# Patient Record
Sex: FEMALE | Race: BLACK OR AFRICAN AMERICAN | Employment: OTHER | URBAN - METROPOLITAN AREA
[De-identification: names, ages, dates, MRNs, and addresses within clinical notes are randomized per-mention and may not be internally consistent; named-entity substitution may affect disease eponyms.]

---

## 2017-01-12 ENCOUNTER — OP HISTORICAL/CONVERTED ENCOUNTER (OUTPATIENT)
Dept: OTHER | Age: 62
End: 2017-01-12

## 2017-03-28 ENCOUNTER — OP HISTORICAL/CONVERTED ENCOUNTER (OUTPATIENT)
Dept: OTHER | Age: 62
End: 2017-03-28

## 2017-06-12 ENCOUNTER — OFFICE VISIT (OUTPATIENT)
Dept: ENDOCRINOLOGY | Age: 62
End: 2017-06-12

## 2017-06-12 VITALS
SYSTOLIC BLOOD PRESSURE: 142 MMHG | HEART RATE: 74 BPM | DIASTOLIC BLOOD PRESSURE: 81 MMHG | WEIGHT: 210 LBS | OXYGEN SATURATION: 96 % | RESPIRATION RATE: 20 BRPM | HEIGHT: 67 IN | BODY MASS INDEX: 32.96 KG/M2 | TEMPERATURE: 98.2 F

## 2017-06-12 DIAGNOSIS — E04.2 MULTINODULAR GOITER: Primary | ICD-10-CM

## 2017-06-12 DIAGNOSIS — M81.0 SENILE OSTEOPOROSIS: ICD-10-CM

## 2017-06-12 DIAGNOSIS — E83.52 HYPERCALCEMIA: ICD-10-CM

## 2017-06-12 RX ORDER — FERROUS SULFATE 324(65)MG
TABLET, DELAYED RELEASE (ENTERIC COATED) ORAL
COMMUNITY
End: 2021-06-21

## 2017-06-12 RX ORDER — TOPIRAMATE 50 MG/1
1 TABLET, FILM COATED ORAL AS NEEDED
COMMUNITY
Start: 2017-03-15 | End: 2021-06-21

## 2017-06-12 RX ORDER — OLANZAPINE 20 MG/1
10 TABLET ORAL
COMMUNITY
Start: 2017-05-14 | End: 2022-06-23

## 2017-06-12 RX ORDER — LITHIUM CARBONATE 300 MG
1 TABLET ORAL 2 TIMES DAILY
COMMUNITY
Start: 2017-06-06 | End: 2021-10-14

## 2017-06-12 NOTE — PROGRESS NOTES
HISTORY OF PRESENT ILLNESS  Janki De Leon is a 63 y.o. female. HPI  Initial visit for thyroid goiter and hypercalcemia   Referred by Dr. Weston Teixeira     Patient is not a good historian   She denies any hyper or hypothyroid symptoms   She denies any compressive symptoms       Past medical history : she has psychiatric issues/ schizophrenia  - on lithium and topiramate     Social History     Social History    Marital status: UNKNOWN     Spouse name: N/A    Number of children: N/A    Years of education: N/A     Occupational History    Not on file. Social History Main Topics    Smoking status: Current Every Day Smoker    Smokeless tobacco: Not on file    Alcohol use Not on file    Drug use: Not on file    Sexual activity: Not on file     Other Topics Concern    Not on file     Social History Narrative    No narrative on file       Family History   Problem Relation Age of Onset    Family history unknown: Yes           Review of Systems   Constitutional: Negative. HENT: Negative. Eyes: Negative for pain and redness. Respiratory: Negative. Cardiovascular: Negative for chest pain, palpitations and leg swelling. Gastrointestinal: Negative. Negative for constipation. Genitourinary: Negative. Musculoskeletal: Negative for myalgias. Skin: Negative. Neurological: Negative. Endo/Heme/Allergies: Negative. Psychiatric/Behavioral: Negative for depression and memory loss. The patient does not have insomnia. Physical Exam   Constitutional: She is oriented to person, place, and time. She appears well-developed and well-nourished. HENT:   Head: Normocephalic. Eyes: Conjunctivae and EOM are normal. Pupils are equal, round, and reactive to light. Neck: Normal range of motion. Neck supple. No JVD present. No tracheal deviation present. Thyromegaly present. Cardiovascular: Normal rate, regular rhythm and normal heart sounds. No murmur heard.   Pulmonary/Chest: Breath sounds normal. Abdominal: Soft. Bowel sounds are normal.   Musculoskeletal: Normal range of motion. Lymphadenopathy:     She has no cervical adenopathy. Neurological: She is alert and oriented to person, place, and time. She has normal reflexes. Skin: Skin is warm. Psychiatric: She has a normal mood and affect. Got labs from feb 2017 : TSH is 3.35      ASSESSMENT and PLAN    1. Multinodular goiter   We discussed the natural history of thyroid nodules and the fact that only 5% are malignant. Discussed how we manage nodules  a) pursuing a fine needle aspiration of the nodule, b) expectant management with repeat ultrasound in 6 months, or c) surgical removal of the nodules/goiter. Will do usg and thyroid scan   - follow up to be determined based on  results    2. Hypercalcemia : was 10.5 on feb 2017 labs   Ordered other labs to look for hyperparathyroidism    3.  Osteoporosis : ordered the bone DEXA  Explained the management plan    > 50 % of time is spent on counseling     Addendum :  After placing all the orders , her brother walks in rudely and mentions to my nurse that pt will not go anywhere, waste of time and money   He says she had all the tests and will drop them off     He did bring a CD back, but it is MRI of brain

## 2017-06-12 NOTE — MR AVS SNAPSHOT
Visit Information Date & Time Provider Department Dept. Phone Encounter #  
 6/12/2017  3:00 PM Paulo Rodríguez MD [facility] [phone] [id number] Follow-up Instructions Return in about 3 months (around 9/12/2017). Upcoming Health Maintenance Date Due Hepatitis C Screening 1955 Pneumococcal 19-64 Medium Risk (1 of 1 - PPSV23) 2/15/1976 DTaP/Tdap/Td series (1 - Tdap) 2/15/1978 PAP AKA CERVICAL CYTOLOGY 2/15/1978 BREAST CANCER SCRN MAMMOGRAM 2/15/2007 FOBT Q 1 YEAR AGE 50-75 2/15/2007 ZOSTER VACCINE AGE 60> 2/15/2017 INFLUENZA AGE 9 TO ADULT 8/1/2017 Allergies as of 6/12/2017  Review Complete On: 6/12/2017 By: Paulo Rodríguez MD  
 No Known Allergies Current Immunizations  Never Reviewed No immunizations on file. Not reviewed this visit You Were Diagnosed With   
  
 Codes Comments Senile osteoporosis    -  Primary ICD-10-CM: M81.0 ICD-9-CM: 733.01 Hypercalcemia     ICD-10-CM: K12.25 
ICD-9-CM: 275.42 Multinodular goiter     ICD-10-CM: E04.2 ICD-9-CM: 966. 1 Vitals BP Pulse Temp Resp Height(growth percentile) Weight(growth percentile) 142/81 74 98.2 °F (36.8 °C) (Oral) 20 5' 7\" (1.702 m) 210 lb (95.3 kg) SpO2 BMI OB Status Smoking Status 96% 32.89 kg/m2 Postmenopausal Current Every Day Smoker BMI and BSA Data Body Mass Index Body Surface Area  
 32.89 kg/m 2 2.12 m 2 Your Updated Medication List  
  
   
This list is accurate as of: 6/12/17  4:53 PM.  Always use your most recent med list.  
  
  
  
  
 ferrous sulfate 324 mg (65 mg iron) tablet Take  by mouth Daily (before breakfast). lithium carbonate 300 mg tablet Take 1 Tab by mouth two (2) times a day. OLANZapine 20 mg tablet Commonly known as:  ZyPREXA Take 1 Tab by mouth nightly. topiramate 50 mg tablet Commonly known as:  TOPAMAX Take 1 Tab by mouth as needed. Follow-up Instructions Return in about 3 months (around 9/12/2017). To-Do List   
 06/12/2017 Imaging:  DEXA BONE DENSITY STUDY AXIAL   
  
 06/12/2017 Imaging:  NM PARATHYROID SCAN   
  
 06/12/2017 Imaging:  US THYROID/PARATHYROID/SOFT TISS Introducing [facility]! [address] introduces [employer] patient portal. Now you can access parts of your medical record, email your doctor's office, and request medication refills online. 1. In your internet browser, go to [email or web address] 2. Click on the First Time User? Click Here link in the Sign In box. You will see the New Member Sign Up page. 3. Enter your [employer] Access Code exactly as it appears below. You will not need to use this code after youve completed the sign-up process. If you do not sign up before the expiration date, you must request a new code. · [employer] Access Code: [email or web address] Expires: 9/10/2017  4:53 PM 
 
4. Enter the last four digits of your Social Security Number (xxxx) and Date of Birth (mm/dd/yyyy) as indicated and click Submit. You will be taken to the next sign-up page. 5. Create a [facility] ID. This will be your [employer] login ID and cannot be changed, so think of one that is secure and easy to remember. 6. Create a [employer] password. You can change your password at any time. 7. Enter your Password Reset Question and Answer. This can be used at a later time if you forget your password. 8. Enter your e-mail address. You will receive e-mail notification when new information is available in [address] 9. Click Sign Up. You can now view and download portions of your medical record. 10. Click the Download Summary menu link to download a portable copy of your medical information. If you have questions, please visit the Frequently Asked Questions section of the [employer] website. Remember, [employer] is NOT to be used for urgent needs. For medical emergencies, dial 911. Now available from your iPhone and Android! Please provide this summary of care documentation to your next provider. Your primary care clinician is listed as Will Villagran. If you have any questions after today's visit, please call [phone].

## 2017-06-12 NOTE — LETTER
6/28/2017 7:53 AM 
 
Patient:  Sherie Sharp YOB: 1955 Date of Visit: 6/12/2017 Dear Prakash Solano MD 
 [address] [facility] [address] VIA Facsimile: [phone] 
 : Thank you for referring Ms. Russell Chavez to me for evaluation/treatment. Below are the relevant portions of my assessment and plan of care. Wt Readings from Last 3 Encounters:  
06/12/17 210 lb (95.3 kg) Temp Readings from Last 3 Encounters:  
06/12/17 98.2 °F (36.8 °C) (Oral) BP Readings from Last 3 Encounters:  
06/12/17 142/81 Pulse Readings from Last 3 Encounters:  
06/12/17 74 HISTORY OF PRESENT ILLNESS Sherie Sharp is a 63 y.o. female. HPI Initial visit for thyroid goiter and hypercalcemia Referred by Dr. Hood Bazan Patient is not a good historian She denies any hyper or hypothyroid symptoms She denies any compressive symptoms Past medical history : she has psychiatric issues/ schizophrenia  - on lithium and topiramate Social History Social History  Marital status: UNKNOWN Spouse name: N/A  
 Number of children: N/A  
 Years of education: N/A Occupational History  Not on file. Social History Main Topics  Smoking status: Current Every Day Smoker  Smokeless tobacco: Not on file  Alcohol use Not on file  Drug use: Not on file  Sexual activity: Not on file Other Topics Concern  Not on file Social History Narrative  No narrative on file Family History Problem Relation Age of Onset  Family history unknown: Yes Review of Systems Constitutional: Negative. HENT: Negative. Eyes: Negative for pain and redness. Respiratory: Negative. Cardiovascular: Negative for chest pain, palpitations and leg swelling. Gastrointestinal: Negative. Negative for constipation. Genitourinary: Negative. Musculoskeletal: Negative for myalgias. Skin: Negative. Neurological: Negative. Endo/Heme/Allergies: Negative. Psychiatric/Behavioral: Negative for depression and memory loss. The patient does not have insomnia. Physical Exam  
Constitutional: She is oriented to person, place, and time. She appears well-developed and well-nourished. HENT:  
Head: Normocephalic. Eyes: Conjunctivae and EOM are normal. Pupils are equal, round, and reactive to light. Neck: Normal range of motion. Neck supple. No JVD present. No tracheal deviation present. Thyromegaly present. Cardiovascular: Normal rate, regular rhythm and normal heart sounds. No murmur heard. Pulmonary/Chest: Breath sounds normal.  
Abdominal: Soft. Bowel sounds are normal.  
Musculoskeletal: Normal range of motion. Lymphadenopathy:  
  She has no cervical adenopathy. Neurological: She is alert and oriented to person, place, and time. She has normal reflexes. Skin: Skin is warm. Psychiatric: She has a normal mood and affect. Got labs from feb 2017 : TSH is 3.35 
 
 
ASSESSMENT and PLAN 1. Multinodular goiter We discussed the natural history of thyroid nodules and the fact that only 5% are malignant. Discussed how we manage nodules  a) pursuing a fine needle aspiration of the nodule, b) expectant management with repeat ultrasound in 6 months, or c) surgical removal of the nodules/goiter. Will do usg and thyroid scan  
- follow up to be determined based on  results 2. Hypercalcemia : was 10.5 on feb 2017 labs Ordered other labs to look for hyperparathyroidism Explained the management plan 
 
> 50 % of time is spent on counseling Addendum : 
After placing all the orders , her brother walks in rudely and mentions to my nurse that pt will not go anywhere, waste of time and money He says she had all the tests and will drop them off He did bring a CD back, but it is MRI of brain If you have questions, please do not hesitate to call me.   I look forward to following Ms. Valdes Dates along with you. Sincerely, Brandon Chapman MD

## 2017-06-12 NOTE — PROGRESS NOTES
Wt Readings from Last 3 Encounters:   06/12/17 210 lb (95.3 kg)     Temp Readings from Last 3 Encounters:   06/12/17 98.2 °F (36.8 °C) (Oral)     BP Readings from Last 3 Encounters:   06/12/17 142/81     Pulse Readings from Last 3 Encounters:   06/12/17 74

## 2017-07-07 ENCOUNTER — HOSPITAL ENCOUNTER (OUTPATIENT)
Dept: MAMMOGRAPHY | Age: 62
Discharge: HOME | End: 2017-07-07
Attending: INTERNAL MEDICINE
Payer: MEDICARE

## 2017-07-07 ENCOUNTER — HOSPITAL ENCOUNTER (OUTPATIENT)
Dept: ULTRASOUND IMAGING | Age: 62
Discharge: HOME | End: 2017-07-07
Attending: INTERNAL MEDICINE
Payer: MEDICARE

## 2017-07-07 ENCOUNTER — HOSPITAL ENCOUNTER (OUTPATIENT)
Dept: NUCLEAR MEDICINE | Age: 62
Discharge: HOME | End: 2017-07-07
Attending: INTERNAL MEDICINE
Payer: MEDICARE

## 2017-07-07 DIAGNOSIS — E04.2 MULTINODULAR GOITER: ICD-10-CM

## 2017-07-07 DIAGNOSIS — E83.52 HYPERCALCEMIA: ICD-10-CM

## 2017-07-07 DIAGNOSIS — M81.0 SENILE OSTEOPOROSIS: ICD-10-CM

## 2017-07-07 PROCEDURE — 77080 DXA BONE DENSITY AXIAL: CPT

## 2017-07-07 PROCEDURE — 78070 PARATHYROID PLANAR IMAGING: CPT

## 2017-07-07 PROCEDURE — 76536 US EXAM OF HEAD AND NECK: CPT

## 2017-07-18 NOTE — PROGRESS NOTES
On ultrasound of thyroid, are 2 left sided nodules , sizes of which dictate need for FNA  On both of them   She is dependant on brother's transportation, and so arrange for in office FNA  In 6-8 weeks

## 2018-01-03 ENCOUNTER — OFFICE VISIT (OUTPATIENT)
Dept: ENDOCRINOLOGY | Age: 63
End: 2018-01-03

## 2018-01-03 ENCOUNTER — DOCUMENTATION ONLY (OUTPATIENT)
Dept: ENDOCRINOLOGY | Age: 63
End: 2018-01-03

## 2018-01-03 ENCOUNTER — HOSPITAL ENCOUNTER (OUTPATIENT)
Dept: LAB | Age: 63
Discharge: HOME | End: 2018-01-03

## 2018-01-03 VITALS
RESPIRATION RATE: 16 BRPM | HEIGHT: 67 IN | OXYGEN SATURATION: 96 % | WEIGHT: 206.4 LBS | HEART RATE: 89 BPM | TEMPERATURE: 98.1 F | DIASTOLIC BLOOD PRESSURE: 82 MMHG | BODY MASS INDEX: 32.39 KG/M2 | SYSTOLIC BLOOD PRESSURE: 153 MMHG

## 2018-01-03 DIAGNOSIS — E04.1 THYROID NODULE: Primary | ICD-10-CM

## 2018-01-03 NOTE — PROGRESS NOTES
[facility]  OFFICE PROCEDURE PROGRESS NOTE        Chart reviewed for the following:   Erika Wong MD, have reviewed the History, Physical and updated the Allergic reactions for Janki Tilley performed immediately prior to start of procedure:   Erika Wong MD, have performed the following reviews on Janki Sy prior to the start of the procedure:            * Patient was identified by name and date of birth   * Agreement on procedure being performed was verified  * Risks and Benefits explained to the patient  * Procedure site verified and marked as necessary  * Patient was positioned for comfort  * Consent was signed and verified     Time: 4.15 pm    Pain scale : 0    Date of procedure: 1/3/2018    Procedure performed by:  Villa Sam MD    Provider assisted by:  Austin Antony LPN    Patient assisted by: her brother       Real time imaging was performed in both transverse and sagittal planes    Left superior pole nodule   Following informed consent, a procedural pause was held to confirm patiient identity and site of biopsy. After sterile preparation, FNA guidance was performed using direct ultrasound guidance to confirm accurate needle placement. 4 aspirations were made using 25 G needles  Samples were submitted to cytology  Patient  tolerated procedure well without complications  After care instructions provided.       Pain scale post procedure : 4

## 2018-01-03 NOTE — MR AVS SNAPSHOT
Visit Information Date & Time Provider Department Dept. Phone Encounter #  
 1/3/2018  3:00 PM Heena Villa MD [facility] [phone] [id number] Follow-up Instructions Return in about 4 months (around 5/3/2018) for biopsy on left lower nodule and visit . Upcoming Health Maintenance Date Due Hepatitis C Screening 1955 Pneumococcal 19-64 Medium Risk (1 of 1 - PPSV23) 2/15/1976 DTaP/Tdap/Td series (1 - Tdap) 2/15/1978 PAP AKA CERVICAL CYTOLOGY 2/15/1978 BREAST CANCER SCRN MAMMOGRAM 2/15/2007 FOBT Q 1 YEAR AGE 50-75 2/15/2007 ZOSTER VACCINE AGE 60> 12/15/2016 Influenza Age 5 to Adult 8/1/2017 Allergies as of 1/3/2018  Review Complete On: 1/3/2018 By: Heena Villa MD  
 No Known Allergies Current Immunizations  Never Reviewed No immunizations on file. Not reviewed this visit Vitals BP Pulse Temp Resp Height(growth percentile) Weight(growth percentile) 153/82 (BP 1 Location: Right arm, BP Patient Position: Sitting) 89 98.1 °F (36.7 °C) (Oral) 16 5' 7\" (1.702 m) 206 lb 6.4 oz (93.6 kg) SpO2 BMI OB Status Smoking Status 96% 32.33 kg/m2 Postmenopausal Current Every Day Smoker Vitals History BMI and BSA Data Body Mass Index Body Surface Area  
 32.33 kg/m 2 2.1 m 2 Preferred Pharmacy Pharmacy Name Phone [phone] [place] [address] [phone] Your Updated Medication List  
  
   
This list is accurate as of: 1/3/18  4:37 PM.  Always use your most recent med list.  
  
  
  
  
 ferrous sulfate 324 mg (65 mg iron) tablet Take  by mouth Daily (before breakfast). lithium carbonate 300 mg tablet Take 1 Tab by mouth two (2) times a day. OLANZapine 20 mg tablet Commonly known as:  ZyPREXA Take 1 Tab by mouth nightly. topiramate 50 mg tablet Commonly known as:  TOPAMAX Take 1 Tab by mouth as needed. Follow-up Instructions Return in about 4 months (around 5/3/2018) for biopsy on left lower nodule and visit . Patient Instructions Please take tylenol 500 mg or Motrin 400 mg (2 pills of 200 mg dose) OTC,  if there is any biopsy site pain You can go back to your normal routine immediately If you notice any dime sized redness, at the biopsy site, it is normal and do not worry If you have more symptoms and signs requiring emergency assistance,  call 911   or go to Emergency room Please provide this summary of care documentation to your next provider. Your primary care clinician is listed as Miguel Ledbetter. If you have any questions after today's visit, please call [phone].

## 2018-01-03 NOTE — PROGRESS NOTES
Chief Complaint   Patient presents with    Biopsy     1. Have you been to the ER, urgent care clinic since your last visit? Hospitalized since your last visit? No    2. Have you seen or consulted any other health care providers outside of the [address] [place] [address] since your last visit? Include any pap smears or colon screening.  No       Wt Readings from Last 3 Encounters:   01/03/18 206 lb 6.4 oz (93.6 kg)   06/12/17 210 lb (95.3 kg)     Temp Readings from Last 3 Encounters:   01/03/18 98.1 °F (36.7 °C) (Oral)   06/12/17 98.2 °F (36.8 °C) (Oral)     BP Readings from Last 3 Encounters:   01/03/18 153/82   06/12/17 142/81     Pulse Readings from Last 3 Encounters:   01/03/18 89   06/12/17 74

## 2018-01-03 NOTE — PROGRESS NOTES
Deferred FNA of left lower pole nodule which could be possible parathyroid adenoma for future     Thomas Hoover MD

## 2018-03-28 ENCOUNTER — HOSPITAL ENCOUNTER (OUTPATIENT)
Dept: MRI IMAGING | Age: 63
Discharge: HOME | End: 2018-03-28
Attending: ORTHOPAEDIC SURGERY
Payer: MEDICARE

## 2018-03-28 DIAGNOSIS — M54.2 CERVICALGIA: ICD-10-CM

## 2018-03-28 PROCEDURE — 72141 MRI NECK SPINE W/O DYE: CPT

## 2018-04-30 ENCOUNTER — TELEPHONE (OUTPATIENT)
Dept: ENDOCRINOLOGY | Age: 63
End: 2018-04-30

## 2018-04-30 DIAGNOSIS — E04.2 MULTINODULAR GOITER: Primary | ICD-10-CM

## 2018-04-30 DIAGNOSIS — E83.52 HYPERCALCEMIA: ICD-10-CM

## 2018-11-27 ENCOUNTER — OP HISTORICAL/CONVERTED ENCOUNTER (OUTPATIENT)
Dept: OTHER | Age: 63
End: 2018-11-27

## 2018-12-12 ENCOUNTER — HOSPITAL ENCOUNTER (OUTPATIENT)
Dept: LAB | Age: 63
Discharge: HOME | End: 2018-12-12
Payer: MEDICARE

## 2018-12-12 ENCOUNTER — OFFICE VISIT (OUTPATIENT)
Dept: ENDOCRINOLOGY | Age: 63
End: 2018-12-12

## 2018-12-12 VITALS
SYSTOLIC BLOOD PRESSURE: 157 MMHG | TEMPERATURE: 97.6 F | WEIGHT: 217.3 LBS | OXYGEN SATURATION: 98 % | HEIGHT: 67 IN | HEART RATE: 85 BPM | BODY MASS INDEX: 34.11 KG/M2 | RESPIRATION RATE: 18 BRPM | DIASTOLIC BLOOD PRESSURE: 88 MMHG

## 2018-12-12 DIAGNOSIS — E55.9 VITAMIN D DEFICIENCY: ICD-10-CM

## 2018-12-12 DIAGNOSIS — E83.52 HYPERCALCEMIA: Primary | ICD-10-CM

## 2018-12-12 DIAGNOSIS — E04.2 MULTINODULAR GOITER: ICD-10-CM

## 2018-12-12 PROCEDURE — 84443 ASSAY THYROID STIM HORMONE: CPT

## 2018-12-12 PROCEDURE — 83970 ASSAY OF PARATHORMONE: CPT

## 2018-12-12 PROCEDURE — 82306 VITAMIN D 25 HYDROXY: CPT

## 2018-12-12 PROCEDURE — 36415 COLL VENOUS BLD VENIPUNCTURE: CPT

## 2018-12-12 PROCEDURE — 80053 COMPREHEN METABOLIC PANEL: CPT

## 2018-12-12 NOTE — PROGRESS NOTES
1. Have you been to the ER, urgent care clinic since your last visit? No  Hospitalized since your last visit? No    2. Have you seen or consulted any other health care providers outside of the [address] [place] [address] since your last visit? Include any pap smears or colon screening.  No     Wt Readings from Last 3 Encounters:   12/12/18 217 lb 4.8 oz (98.6 kg)   01/03/18 206 lb 6.4 oz (93.6 kg)   06/12/17 210 lb (95.3 kg)     Temp Readings from Last 3 Encounters:   12/12/18 97.6 °F (36.4 °C) (Oral)   01/03/18 98.1 °F (36.7 °C) (Oral)   06/12/17 98.2 °F (36.8 °C) (Oral)     BP Readings from Last 3 Encounters:   12/12/18 157/88   01/03/18 153/82   06/12/17 142/81     Pulse Readings from Last 3 Encounters:   12/12/18 85   01/03/18 89   06/12/17 74

## 2018-12-12 NOTE — PROGRESS NOTES
HISTORY OF PRESENT ILLNESS  Janki Burnette is a 66 y.o. female. First f/u after initial visit for thyroid goiter and hypercalcemia   From June 2017       Pt had FNA done in jan 2018   Negative for cancer       Pt had undergone parathyroid study  In nov 2018 for hypercalcemia eval  - done by nephrologist           Old history   Referred by Dr. Melanie Oconnor     Patient is not a good historian   She denies any hyper or hypothyroid symptoms   She denies any compressive symptoms       Past medical history : she has psychiatric issues/ schizophrenia  - on lithium and topiramate     Social History     Socioeconomic History    Marital status: SINGLE     Spouse name: Not on file    Number of children: Not on file    Years of education: Not on file    Highest education level: Not on file   Social Needs    Financial resource strain: Not on file    Food insecurity - worry: Not on file    Food insecurity - inability: Not on file    Transportation needs - medical: Not on file   [employer] needs - non-medical: Not on file   Occupational History    Not on file   Tobacco Use    Smoking status: Current Every Day Smoker    Smokeless tobacco: Never Used   Substance and Sexual Activity    Alcohol use: Not on file    Drug use: Not on file    Sexual activity: Not on file   Other Topics Concern    Not on file   Social History Narrative    Not on file       Family History   Family history unknown: Yes           Review of Systems   Constitutional: Negative. HENT: Negative. Eyes: Negative for pain and redness. Respiratory: Negative. Cardiovascular: Negative for chest pain, palpitations and leg swelling. Gastrointestinal: Negative. Negative for constipation. Genitourinary: Negative. Musculoskeletal: Negative for myalgias. Skin: Negative. Neurological: Negative. Endo/Heme/Allergies: Negative. Psychiatric/Behavioral: Negative for depression and memory loss. The patient does not have insomnia. Physical Exam   Constitutional: She is oriented to person, place, and time. She appears well-developed and well-nourished. HENT:   Head: Normocephalic. Eyes: Conjunctivae and EOM are normal. Pupils are equal, round, and reactive to light. Neck: Normal range of motion. Neck supple. No JVD present. No tracheal deviation present. Thyromegaly present. Cardiovascular: Normal rate, regular rhythm and normal heart sounds. No murmur heard. Pulmonary/Chest: Breath sounds normal.   Abdominal: Soft. Bowel sounds are normal.   Musculoskeletal: Normal range of motion. Lymphadenopathy:     She has no cervical adenopathy. Neurological: She is alert and oriented to person, place, and time. She has normal reflexes. Skin: Skin is warm. Psychiatric: She has a normal mood and affect. Got labs from feb 2017 : TSH is 3.35      ASSESSMENT and PLAN    1. Multinodular goiter   usg : July 2017    Left side upper nodule is thyroid nodule  And   Lower nodule is 1.8 cm seems parathyroid nodule   FNA  Done in jan 2018  On left lobe upper nodule -    Negative for cancer     2. Hypercalcemia : was 10.5 on feb 2017 labs   Ordered other labs to look for hyperparathyroidism, which she got it done at nephrologist office   Intact  PTH  77 from oct 25 2018     The parathyroid scan at [facility] positivity on left inferior pole matching to the location of  parathyrodi nodule on usg done at [facility]   She can go for parathyroidectomy       3. Very low vit d   :  Very low at  5.9      3.  Osteoporosis :   Date : juy 2017  Bone DEXA  ap spine T-score 1.5 ; left femoral Neck T- score  -0.7 , right femoral neck T-score n/ a      > 50 % of time is spent on counseling   Patient voiced understanding her plan of care       Addendum :  After placing all the orders , her brother walks in rudely and mentions to my nurse that pt will not go anywhere, waste of time and money   He says she had all the tests and will drop them off   He did bring a CD back, but it is MRI of brain

## 2018-12-12 NOTE — PATIENT INSTRUCTIONS
--------------------------------------------------------------------------------------------    Refills    -    please call your pharmacy and have them send us a refill request    Results  -  allow up to a week for lab results to be processed and reviewed. Phone calls  -  Allow upto 24 hrs. for non-urgent calls to be retained    Prior authorization - It may take up to 4 weeks to process, depending on your insurance    Forms  -  FMLA, DMV, patient assistance, etc. will take up to 2 weeks to process    Cancellations - please notify the office in advance if you cannot keep your appointment    Samples  - will only be dispensed at visits as supply is limited      If you are having a medical emergency call 911    --------------------------------------------------------------------------------------------      24 hour urine collection instructions  For calcium  Sodium and ceat     On the day you plan to collect urine    1. Discard first urine sample soon after you get up    2. Start collecting urine samples in the container then on whole first day . Lito Willoughby ... 3. until the first sample next day is collected into the jar.

## 2018-12-13 LAB
25(OH)D3+25(OH)D2 SERPL-MCNC: 5.2 NG/ML (ref 30–100)
ALBUMIN SERPL-MCNC: 4.4 G/DL (ref 3.6–4.8)
ALBUMIN/GLOB SERPL: 1.6 {RATIO} (ref 1.2–2.2)
ALP SERPL-CCNC: 97 IU/L (ref 39–117)
ALT SERPL-CCNC: 11 IU/L (ref 0–32)
AST SERPL-CCNC: 13 IU/L (ref 0–40)
BILIRUB SERPL-MCNC: 0.4 MG/DL (ref 0–1.2)
BUN SERPL-MCNC: 9 MG/DL (ref 8–27)
BUN/CREAT SERPL: 7 (ref 12–28)
CALCIUM SERPL-MCNC: 10.6 MG/DL (ref 8.7–10.3)
CHLORIDE SERPL-SCNC: 104 MMOL/L (ref 96–106)
CO2 SERPL-SCNC: 27 MMOL/L (ref 20–29)
CREAT SERPL-MCNC: 1.24 MG/DL (ref 0.57–1)
GLOBULIN SER CALC-MCNC: 2.8 G/DL (ref 1.5–4.5)
GLUCOSE SERPL-MCNC: 77 MG/DL (ref 65–99)
INTERPRETATION: NORMAL
POTASSIUM SERPL-SCNC: 4.3 MMOL/L (ref 3.5–5.2)
PROT SERPL-MCNC: 7.2 G/DL (ref 6–8.5)
PTH-INTACT SERPL-MCNC: 90 PG/ML (ref 15–65)
SODIUM SERPL-SCNC: 143 MMOL/L (ref 134–144)
TSH SERPL DL<=0.005 MIU/L-ACNC: 2.93 UIU/ML (ref 0.45–4.5)

## 2018-12-14 NOTE — PROGRESS NOTES
This pt has low health literacy - so explain it well to her     This pt has slightly higher calcium level,  Has high functioning parathyrodi gland     She has very low vit D level - which could be result of above or   Could be very low from low intake itself --     She could be on low dose OTC vit d supplementation 1000 int units a day

## 2018-12-17 NOTE — PROGRESS NOTES
Patient informed of results. Patient verbalized understanding. States she will  OTC Vitamin D today.

## 2018-12-18 ENCOUNTER — HOSPITAL ENCOUNTER (OUTPATIENT)
Dept: LAB | Age: 63
Discharge: HOME | End: 2018-12-18
Payer: MEDICARE

## 2018-12-18 DIAGNOSIS — E55.9 VITAMIN D DEFICIENCY: ICD-10-CM

## 2018-12-18 DIAGNOSIS — E83.52 HYPERCALCEMIA: ICD-10-CM

## 2018-12-18 PROCEDURE — 84300 ASSAY OF URINE SODIUM: CPT

## 2018-12-18 PROCEDURE — 82570 ASSAY OF URINE CREATININE: CPT

## 2018-12-18 PROCEDURE — 82340 ASSAY OF CALCIUM IN URINE: CPT

## 2018-12-19 LAB
CALCIUM 24H UR-MCNC: 0.8 MG/DL
CALCIUM 24H UR-MRATE: 24 MG/24 HR (ref 100–300)
CREAT 24H UR-MRATE: 474 MG/24 HR (ref 800–1800)
CREAT UR-MCNC: 15.8 MG/DL
SODIUM 24H UR-SCNC: <20 MMOL/L
SODIUM 24H UR-SRATE: <60 MMOL/24 HR (ref 39–258)

## 2018-12-26 NOTE — PROGRESS NOTES
The urine collection Is incomplete catch   Please explain to her ( she does have psychiatrist issues )    Can collect again  Properly

## 2019-01-07 ENCOUNTER — TELEPHONE (OUTPATIENT)
Dept: ENDOCRINOLOGY | Age: 64
End: 2019-01-07

## 2019-01-07 DIAGNOSIS — E83.52 HYPERCALCEMIA: Primary | ICD-10-CM

## 2019-01-07 NOTE — PROGRESS NOTES
Reviewed with patient and family member how urine should be collected.  Will be in Wednesday or Friday to get a container

## 2019-01-09 ENCOUNTER — OP HISTORICAL/CONVERTED ENCOUNTER (OUTPATIENT)
Dept: OTHER | Age: 64
End: 2019-01-09

## 2019-01-11 ENCOUNTER — HOSPITAL ENCOUNTER (OUTPATIENT)
Dept: LAB | Age: 64
Discharge: HOME | End: 2019-01-11
Payer: MEDICARE

## 2019-01-11 DIAGNOSIS — E83.52 HYPERCALCEMIA: ICD-10-CM

## 2019-01-11 PROCEDURE — 84300 ASSAY OF URINE SODIUM: CPT

## 2019-01-11 PROCEDURE — 82570 ASSAY OF URINE CREATININE: CPT

## 2019-01-11 PROCEDURE — 82340 ASSAY OF CALCIUM IN URINE: CPT

## 2019-01-12 LAB
CALCIUM 24H UR-MCNC: <0.8 MG/DL
CALCIUM 24H UR-MRATE: <24 MG/24 HR (ref 100–300)
CREAT 24H UR-MRATE: 495 MG/24 HR (ref 800–1800)
CREAT UR-MCNC: 16.5 MG/DL
SODIUM 24H UR-SCNC: <20 MMOL/L
SODIUM 24H UR-SRATE: <60 MMOL/24 HR (ref 39–258)

## 2019-01-17 ENCOUNTER — DOCUMENTATION ONLY (OUTPATIENT)
Dept: ENDOCRINOLOGY | Age: 64
End: 2019-01-17

## 2019-01-17 NOTE — PROGRESS NOTES
Upon review of all her labs,     She has hypercalcemia in a setting of low renal function and very low Vit d ., favoring primary hyperparathyroidism ( ipth is high too )      The parathyroid scan and 24 hr urine for calcium are negative becaue of very low vit D status and poor collection of urine itself  incl the recent collection jan 2019       As it is asymptomatic disease, will keep in observation only       Kenia Prater MD

## 2019-02-01 ENCOUNTER — OP HISTORICAL/CONVERTED ENCOUNTER (OUTPATIENT)
Dept: OTHER | Age: 64
End: 2019-02-01

## 2019-03-01 ENCOUNTER — OP HISTORICAL/CONVERTED ENCOUNTER (OUTPATIENT)
Dept: OTHER | Age: 64
End: 2019-03-01

## 2019-05-07 ENCOUNTER — OP HISTORICAL/CONVERTED ENCOUNTER (OUTPATIENT)
Dept: OTHER | Age: 64
End: 2019-05-07

## 2019-06-06 ENCOUNTER — OP HISTORICAL/CONVERTED ENCOUNTER (OUTPATIENT)
Dept: OTHER | Age: 64
End: 2019-06-06

## 2019-06-12 ENCOUNTER — HOSPITAL ENCOUNTER (OUTPATIENT)
Dept: LAB | Age: 64
Discharge: HOME | End: 2019-06-12
Payer: MEDICARE

## 2019-06-12 DIAGNOSIS — E83.52 HYPERCALCEMIA: ICD-10-CM

## 2019-06-12 DIAGNOSIS — E04.2 MULTINODULAR GOITER: ICD-10-CM

## 2019-06-12 PROCEDURE — 84439 ASSAY OF FREE THYROXINE: CPT

## 2019-06-12 PROCEDURE — 82306 VITAMIN D 25 HYDROXY: CPT

## 2019-06-12 PROCEDURE — 36415 COLL VENOUS BLD VENIPUNCTURE: CPT

## 2019-06-12 PROCEDURE — 83970 ASSAY OF PARATHORMONE: CPT

## 2019-06-12 PROCEDURE — 84443 ASSAY THYROID STIM HORMONE: CPT

## 2019-06-12 PROCEDURE — 80053 COMPREHEN METABOLIC PANEL: CPT

## 2019-06-13 LAB
25(OH)D3+25(OH)D2 SERPL-MCNC: 27.7 NG/ML (ref 30–100)
ALBUMIN SERPL-MCNC: 4.4 G/DL (ref 3.6–4.8)
ALBUMIN/GLOB SERPL: 1.8 {RATIO} (ref 1.2–2.2)
ALP SERPL-CCNC: 82 IU/L (ref 39–117)
ALT SERPL-CCNC: 11 IU/L (ref 0–32)
AST SERPL-CCNC: 11 IU/L (ref 0–40)
BILIRUB SERPL-MCNC: 0.6 MG/DL (ref 0–1.2)
BUN SERPL-MCNC: 8 MG/DL (ref 8–27)
BUN/CREAT SERPL: 7 (ref 12–28)
CALCIUM SERPL-MCNC: 10.8 MG/DL (ref 8.7–10.3)
CHLORIDE SERPL-SCNC: 107 MMOL/L (ref 96–106)
CO2 SERPL-SCNC: 26 MMOL/L (ref 20–29)
CREAT SERPL-MCNC: 1.22 MG/DL (ref 0.57–1)
GLOBULIN SER CALC-MCNC: 2.4 G/DL (ref 1.5–4.5)
GLUCOSE SERPL-MCNC: 100 MG/DL (ref 65–99)
INTERPRETATION: NORMAL
POTASSIUM SERPL-SCNC: 4.4 MMOL/L (ref 3.5–5.2)
PROT SERPL-MCNC: 6.8 G/DL (ref 6–8.5)
PTH-INTACT SERPL-MCNC: 80 PG/ML (ref 15–65)
SODIUM SERPL-SCNC: 146 MMOL/L (ref 134–144)
T4 FREE SERPL-MCNC: 1.07 NG/DL (ref 0.82–1.77)
TSH SERPL DL<=0.005 MIU/L-ACNC: 2.39 UIU/ML (ref 0.45–4.5)

## 2019-06-19 ENCOUNTER — OFFICE VISIT (OUTPATIENT)
Dept: ENDOCRINOLOGY | Age: 64
End: 2019-06-19

## 2019-06-19 VITALS
WEIGHT: 213.6 LBS | OXYGEN SATURATION: 98 % | HEIGHT: 67 IN | BODY MASS INDEX: 33.53 KG/M2 | TEMPERATURE: 97.1 F | SYSTOLIC BLOOD PRESSURE: 139 MMHG | HEART RATE: 88 BPM | RESPIRATION RATE: 18 BRPM | DIASTOLIC BLOOD PRESSURE: 84 MMHG

## 2019-06-19 DIAGNOSIS — M81.0 SENILE OSTEOPOROSIS: ICD-10-CM

## 2019-06-19 DIAGNOSIS — D35.1 BENIGN TUMOR OF PARATHYROID GLAND: ICD-10-CM

## 2019-06-19 DIAGNOSIS — N18.30 CKD (CHRONIC KIDNEY DISEASE) STAGE 3, GFR 30-59 ML/MIN (HCC): ICD-10-CM

## 2019-06-19 DIAGNOSIS — E83.52 HYPERCALCEMIA: Primary | ICD-10-CM

## 2019-06-19 NOTE — LETTER
6/19/19 Patient: Miles Esparza YOB: 1955 Date of Visit: 6/19/2019 Will Art, [address] VIA Facsimile: [phone] Dear Will Art MD, Thank you for referring Ms. Janki Sy to [id number] [place] [address] for evaluation. My notes for this consultation are attached. If you have questions, please do not hesitate to call me. I look forward to following your patient along with you. Sincerely, Kelsi Doll MD

## 2019-06-19 NOTE — PROGRESS NOTES
HISTORY OF PRESENT ILLNESS  Janki Cardenas is a 60 y.o. female.    f/u after  Last visit for thyroid goiter and hypercalcemia   From dec 2018     PT HAD LABS DONE AGAIN PER MY REQUEST AND IS HERE TO DISCUSS         Old history :    Lost seen June 2017   Pt had FNA done in jan 2018   Negative for cancer   Pt had undergone parathyroid study  In nov 2018 for hypercalcemia eval  - done by nephrologist           Old history   Referred by Dr. Buddy Rodriguez     Patient is not a good historian   She denies any hyper or hypothyroid symptoms   She denies any compressive symptoms       Past medical history : she has psychiatric issues/ schizophrenia  - on lithium and topiramate     Social History     Socioeconomic History    Marital status: SINGLE     Spouse name: Not on file    Number of children: Not on file    Years of education: Not on file    Highest education level: Not on file   Occupational History    Not on file   Social Needs    Financial resource strain: Not on file    Food insecurity:     Worry: Not on file     Inability: Not on file    Transportation needs:     Medical: Not on file     Non-medical: Not on file   Tobacco Use    Smoking status: Current Every Day Smoker    Smokeless tobacco: Never Used   Substance and Sexual Activity    Alcohol use: Not on file    Drug use: Not on file    Sexual activity: Not on file   Lifestyle    Physical activity:     Days per week: Not on file     Minutes per session: Not on file    Stress: Not on file   Relationships    Social connections:     Talks on phone: Not on file     Gets together: Not on file     Attends Synagogue service: Not on file     Active member of club or organization: Not on file     Attends meetings of clubs or organizations: Not on file     Relationship status: Not on file    Intimate partner violence:     Fear of current or ex partner: Not on file     Emotionally abused: Not on file     Physically abused: Not on file     Forced sexual activity: Not on file   Other Topics Concern    Not on file   Social History Narrative    Not on file       Family History   Family history unknown: Yes           Review of Systems   Constitutional: Negative. HENT: Negative. Eyes: Negative for pain and redness. Respiratory: Negative. Cardiovascular: Negative for chest pain, palpitations and leg swelling. Gastrointestinal: Negative. Negative for constipation. Genitourinary: Negative. Musculoskeletal: Negative for myalgias. Skin: Negative. Neurological: Negative. Endo/Heme/Allergies: Negative. Psychiatric/Behavioral: Negative for depression and memory loss. The patient does not have insomnia. Physical Exam   Constitutional: She is oriented to person, place, and time. She appears well-developed and well-nourished. HENT:   Head: Normocephalic. Eyes: Pupils are equal, round, and reactive to light. Conjunctivae and EOM are normal.   Neck: Normal range of motion. Neck supple. No JVD present. No tracheal deviation present. Thyromegaly present. Cardiovascular: Normal rate, regular rhythm and normal heart sounds. No murmur heard. Pulmonary/Chest: Breath sounds normal.   Abdominal: Soft. Bowel sounds are normal.   Musculoskeletal: Normal range of motion. Lymphadenopathy:     She has no cervical adenopathy. Neurological: She is alert and oriented to person, place, and time. She has normal reflexes. Skin: Skin is warm. Psychiatric: She has a normal mood and affect. Lab Results   Component Value Date/Time    Glucose 100 (H) 06/12/2019 09:20 AM    Creatinine 1.22 (H) 06/12/2019 09:20 AM      No results found for: CHOL, CHOLPOCT, HDL, LDL, LDLC, LDLCPOC, LDLCEXT, TRIGL, TGLPOCT, CHHD, CHHDX  Lab Results   Component Value Date/Time    ALT (SGPT) 11 06/12/2019 09:20 AM    AST (SGOT) 11 06/12/2019 09:20 AM    Alk.  phosphatase 82 06/12/2019 09:20 AM    Bilirubin, total 0.6 06/12/2019 09:20 AM    Albumin 4.4 06/12/2019 09:20 AM    Protein, total 6.8 06/12/2019 09:20 AM     Lab Results   Component Value Date/Time    GFR est non-AA 48 (L) 06/12/2019 09:20 AM    GFR est AA 55 (L) 06/12/2019 09:20 AM    Creatinine 1.22 (H) 06/12/2019 09:20 AM    BUN 8 06/12/2019 09:20 AM    Sodium 146 (H) 06/12/2019 09:20 AM    Potassium 4.4 06/12/2019 09:20 AM    Chloride 107 (H) 06/12/2019 09:20 AM    CO2 26 06/12/2019 09:20 AM    PTH, Intact 80 (H) 06/12/2019 09:20 AM           ASSESSMENT and PLAN    1. Multinodular goiter   usg : July 2017    Left side upper nodule is thyroid nodule  And   Lower nodule is 1.8 cm seems parathyroid nodule   FNA  Done in jan 2018  On left lobe upper nodule -    Negative for cancer     2. Hypercalcemia : was 10.8 on June 2019   Ordered  labs  AGAIN to look for hyperparathyroidism,  AND SHE HAS BOTH SECONDARY AND PRIMARY HYPERPARATHYRODISM      HER NEPHROLOGIST IS DR. Isael Duval   The parathyroid scan at [facility]  Showed positivity on left inferior pole matching to the location of  parathyrodi nodule on usg done at [facility]   She can go for parathyroidectomy,  but pt defers it       3. Very low vit d   :   IMPROVED TO 27    FROM BEING Very low at  5.9      3. Osteoporosis :   Date : july 2017  Bone DEXA  ap spine T-score 1.5 ; left femoral Neck T- score  -0.7 , right femoral neck T-score n/ a  Due now IN July 2019   BONE LOSS COULD BE RENAL OSTEODYSTROPHY VERSUS OSTEOPOROSIS FROM PRIMAY AND SECONDARY REASONS       5.  ON LITHIUM FOR BIPOLAR DISEASE         > 50 % of time is spent on counseling   Patient voiced understanding her plan of care

## 2019-06-19 NOTE — PROGRESS NOTES
1. Have you been to the ER, urgent care clinic since your last visit? No Hospitalized since your last visit? No    2. Have you seen or consulted any other health care providers outside of the [address] [place] [address] since your last visit? Include any pap smears or colon screening.  No     Wt Readings from Last 3 Encounters:   06/19/19 213 lb 9.6 oz (96.9 kg)   12/12/18 217 lb 4.8 oz (98.6 kg)   01/03/18 206 lb 6.4 oz (93.6 kg)     Temp Readings from Last 3 Encounters:   06/19/19 97.1 °F (36.2 °C) (Oral)   12/12/18 97.6 °F (36.4 °C) (Oral)   01/03/18 98.1 °F (36.7 °C) (Oral)     BP Readings from Last 3 Encounters:   06/19/19 139/84   12/12/18 157/88   01/03/18 153/82     Pulse Readings from Last 3 Encounters:   06/19/19 88   12/12/18 85   01/03/18 89

## 2020-05-21 ENCOUNTER — TELEPHONE (OUTPATIENT)
Dept: ENDOCRINOLOGY | Age: 65
End: 2020-05-21

## 2020-05-21 DIAGNOSIS — E83.52 HYPERCALCEMIA: Primary | ICD-10-CM

## 2020-05-21 DIAGNOSIS — E55.9 VITAMIN D DEFICIENCY: ICD-10-CM

## 2020-05-21 DIAGNOSIS — M81.0 SENILE OSTEOPOROSIS: ICD-10-CM

## 2020-05-21 DIAGNOSIS — E04.2 MULTINODULAR GOITER: ICD-10-CM

## 2020-05-21 DIAGNOSIS — E83.52 HYPERCALCEMIA: ICD-10-CM

## 2020-05-21 NOTE — TELEPHONE ENCOUNTER
Order placed for pt per verbal order with read back from Dr. Srinivas Melgar 05/21/20    Lab slips mailed

## 2020-08-14 ENCOUNTER — OFFICE VISIT (OUTPATIENT)
Dept: ENDOCRINOLOGY | Age: 65
End: 2020-08-14
Payer: MEDICARE

## 2020-08-14 VITALS
HEART RATE: 98 BPM | HEIGHT: 67 IN | SYSTOLIC BLOOD PRESSURE: 128 MMHG | DIASTOLIC BLOOD PRESSURE: 85 MMHG | WEIGHT: 212 LBS | OXYGEN SATURATION: 99 % | TEMPERATURE: 97.5 F | BODY MASS INDEX: 33.27 KG/M2 | RESPIRATION RATE: 18 BRPM

## 2020-08-14 DIAGNOSIS — N18.30 CKD (CHRONIC KIDNEY DISEASE) STAGE 3, GFR 30-59 ML/MIN (HCC): ICD-10-CM

## 2020-08-14 DIAGNOSIS — E83.52 HYPERCALCEMIA: Primary | ICD-10-CM

## 2020-08-14 DIAGNOSIS — M81.0 SENILE OSTEOPOROSIS: ICD-10-CM

## 2020-08-14 DIAGNOSIS — E04.2 MULTINODULAR GOITER: ICD-10-CM

## 2020-08-14 PROCEDURE — 99214 OFFICE O/P EST MOD 30 MIN: CPT | Performed by: INTERNAL MEDICINE

## 2020-08-14 RX ORDER — AMLODIPINE BESYLATE 5 MG/1
10 TABLET ORAL DAILY
COMMUNITY
End: 2022-06-23

## 2020-08-14 RX ORDER — IMIPRAMINE HYDROCHLORIDE 25 MG/1
25 TABLET ORAL
COMMUNITY
End: 2021-10-14

## 2020-08-14 RX ORDER — CHOLECALCIFEROL TAB 125 MCG (5000 UNIT) 125 MCG
TAB ORAL DAILY
COMMUNITY
End: 2021-06-21

## 2020-08-14 NOTE — PROGRESS NOTES
1. Have you been to the ER, urgent care clinic since your last visit? No  Hospitalized since your last visit? No    2. Have you seen or consulted any other health care providers outside of the [address] [place] [address] since your last visit? Include any pap smears or colon screening.  No     Wt Readings from Last 3 Encounters:   08/14/20 212 lb (96.2 kg)   06/19/19 213 lb 9.6 oz (96.9 kg)   12/12/18 217 lb 4.8 oz (98.6 kg)     Temp Readings from Last 3 Encounters:   08/14/20 97.5 °F (36.4 °C) (Oral)   06/19/19 97.1 °F (36.2 °C) (Oral)   12/12/18 97.6 °F (36.4 °C) (Oral)     BP Readings from Last 3 Encounters:   08/14/20 128/85   06/19/19 139/84   12/12/18 157/88     Pulse Readings from Last 3 Encounters:   08/14/20 98   06/19/19 88   12/12/18 85

## 2020-08-14 NOTE — PROGRESS NOTES
HISTORY OF PRESENT ILLNESS  Janki Cuadra is a 63 y.o. female.    one year  f/u after  Last visit for thyroid goiter and hypercalcemia   From June 2019     She looked drowsy - appeared confused   She thought I am her nephrologist   Forgot why she was seeing me         June 2019       PT HAD LABS DONE AGAIN PER MY REQUEST AND IS HERE TO DISCUSS         Old history :    Lost seen June 2017   Pt had FNA done in jan 2018   Negative for cancer   Pt had undergone parathyroid study  In nov 2018 for hypercalcemia eval  - done by nephrologist           Old history   Referred by Dr. Dwayne Luz   Patient is not a good historian   She denies any hyper or hypothyroid symptoms   She denies any compressive symptoms   Past medical history : she has psychiatric issues/ schizophrenia  - on lithium and topiramate     Social History     Socioeconomic History    Marital status: SINGLE     Spouse name: Not on file    Number of children: Not on file    Years of education: Not on file    Highest education level: Not on file   Occupational History    Not on file   Social Needs    Financial resource strain: Not on file    Food insecurity     Worry: Not on file     Inability: Not on file    Transportation needs     Medical: Not on file     Non-medical: Not on file   Tobacco Use    Smoking status: Current Every Day Smoker    Smokeless tobacco: Never Used   Substance and Sexual Activity    Alcohol use: Not on file    Drug use: Not on file    Sexual activity: Not on file   Lifestyle    Physical activity     Days per week: Not on file     Minutes per session: Not on file    Stress: Not on file   Relationships    Social connections     Talks on phone: Not on file     Gets together: Not on file     Attends Mosque service: Not on file     Active member of club or organization: Not on file     Attends meetings of clubs or organizations: Not on file     Relationship status: Not on file    Intimate partner violence     Fear of current or ex partner: Not on file     Emotionally abused: Not on file     Physically abused: Not on file     Forced sexual activity: Not on file   Other Topics Concern    Not on file   Social History Narrative    Not on file       Family History   Family history unknown: Yes           Review of Systems   Constitutional: Negative. HENT: Negative. Eyes: Negative for pain and redness. Respiratory: Negative. Cardiovascular: Negative for chest pain, palpitations and leg swelling. Gastrointestinal: Negative. Negative for constipation. Genitourinary: Negative. Musculoskeletal: Negative for myalgias. Skin: Negative. Neurological: Negative. Endo/Heme/Allergies: Negative. Psychiatric/Behavioral: Negative for depression and memory loss. The patient does not have insomnia. Physical Exam   Constitutional: She is oriented to person, place, and time. She appears well-developed and well-nourished. drowsy   HENT:   Head: Normocephalic. Eyes: Pupils are equal, round, and reactive to light. Conjunctivae and EOM are normal.   Neck: Normal range of motion. Neck supple. No JVD present. No tracheal deviation present. Thyromegaly present. Cardiovascular: Normal rate, regular rhythm and normal heart sounds. No murmur heard. Pulmonary/Chest: Breath sounds normal.   Abdominal: Soft. Bowel sounds are normal.   Musculoskeletal: Normal range of motion. Lymphadenopathy:     She has no cervical adenopathy. Neurological: She is alert and oriented to person, place, and time. She has normal reflexes. Skin: Skin is warm. Psychiatric: She has a normal mood and affect. Labs not done       ASSESSMENT and PLAN    1. Multinodular goiter   usg : July 2017    Left side upper nodule is thyroid nodule  And   Lower nodule is 1.8 cm seems parathyroid nodule   FNA  Done in jan 2018  On left lobe upper nodule -    Negative for cancer   She is asymptomatic   Ordered thyroid ysg     2.  Hypercalcemia : was 10.8 on June 2019   Ordered labs  Ordered  labs  AGAIN to look for hyperparathyroidism,  AND SHE HAS BOTH SECONDARY AND PRIMARY HYPERPARATHYRODISM    HER NEPHROLOGIST IS DR. Marcelino Landrum   The parathyroid scan at [facility]  Showed positivity on left inferior pole matching to the location of  parathyrodi nodule on usg done at [facility]   She can go for parathyroidectomy,  but pt deferred it       3. Very low vit d   :   IMPROVED TO 27    FROM BEING Very low at  5.9      3. Osteoporosis :   Date : july 2017  Bone DEXA  ap spine T-score 1.5 ; left femoral Neck T- score  -0.7 , right femoral neck T-score n/ a  Due now IN July 2019 - ordered DEXA  BONE LOSS COULD BE RENAL OSTEODYSTROPHY VERSUS OSTEOPOROSIS FROM PRIMAY AND SECONDARY REASONS       5.  ON LITHIUM FOR BIPOLAR DISEASE         > 50 % of time is spent on counseling   Patient voiced understanding her plan of care

## 2020-08-14 NOTE — PATIENT INSTRUCTIONS
You see me for thyroid goiter , high calcium and   Bone loss       You got to go for ultrasound and bone density test when they call

## 2020-08-14 NOTE — LETTER
8/16/20 Patient: Azalia Gordon YOB: 1955 Date of Visit: 8/14/2020 Ximena Richardson, [address] VIA Facsimile: [phone] Dear Ximena Richardson MD, Thank you for referring Ms. Janki Sy to [address] [place] [address] for evaluation. My notes for this consultation are attached. If you have questions, please do not hesitate to call me. I look forward to following your patient along with you. Sincerely, Rianna Cobian MD

## 2020-08-18 ENCOUNTER — DOCUMENTATION ONLY (OUTPATIENT)
Dept: ENDOCRINOLOGY | Age: 65
End: 2020-08-18

## 2020-08-19 ENCOUNTER — DOCUMENTATION ONLY (OUTPATIENT)
Dept: ENDOCRINOLOGY | Age: 65
End: 2020-08-19

## 2020-08-20 NOTE — PROGRESS NOTES
Labs from aug 17 2020     Creat is 1.7   Calcium is 10.6    ipth is 66  TSH is 4.4       She has mild thyroid problem and   Mild hyper parathyrodi state   She had kidney function  Creat 1.7       Inform pt        Jamari Buenrostro MD

## 2020-08-24 ENCOUNTER — HOSPITAL ENCOUNTER (OUTPATIENT)
Dept: MAMMOGRAPHY | Age: 65
Discharge: HOME | End: 2020-08-24
Attending: INTERNAL MEDICINE
Payer: MEDICARE

## 2020-08-24 ENCOUNTER — HOSPITAL ENCOUNTER (OUTPATIENT)
Dept: ULTRASOUND IMAGING | Age: 65
Discharge: HOME | End: 2020-08-24
Attending: INTERNAL MEDICINE
Payer: MEDICARE

## 2020-08-24 DIAGNOSIS — N18.30 CKD (CHRONIC KIDNEY DISEASE) STAGE 3, GFR 30-59 ML/MIN (HCC): ICD-10-CM

## 2020-08-24 DIAGNOSIS — E04.2 MULTINODULAR GOITER: ICD-10-CM

## 2020-08-24 DIAGNOSIS — E83.52 HYPERCALCEMIA: ICD-10-CM

## 2020-08-24 DIAGNOSIS — M81.0 SENILE OSTEOPOROSIS: ICD-10-CM

## 2020-08-24 PROCEDURE — 77080 DXA BONE DENSITY AXIAL: CPT

## 2020-08-24 PROCEDURE — 76536 US EXAM OF HEAD AND NECK: CPT

## 2020-10-01 DIAGNOSIS — E83.52 HYPERCALCEMIA: ICD-10-CM

## 2020-10-01 DIAGNOSIS — N18.30 CKD (CHRONIC KIDNEY DISEASE) STAGE 3, GFR 30-59 ML/MIN (HCC): ICD-10-CM

## 2020-10-01 DIAGNOSIS — E04.2 MULTINODULAR GOITER: ICD-10-CM

## 2020-10-01 DIAGNOSIS — M81.0 SENILE OSTEOPOROSIS: ICD-10-CM

## 2020-10-07 DIAGNOSIS — M81.0 SENILE OSTEOPOROSIS: ICD-10-CM

## 2020-10-07 DIAGNOSIS — E83.52 HYPERCALCEMIA: ICD-10-CM

## 2020-10-07 DIAGNOSIS — N18.30 CKD (CHRONIC KIDNEY DISEASE) STAGE 3, GFR 30-59 ML/MIN (HCC): ICD-10-CM

## 2020-10-07 DIAGNOSIS — E04.2 MULTINODULAR GOITER: ICD-10-CM

## 2021-02-14 DIAGNOSIS — E04.2 MULTINODULAR GOITER: ICD-10-CM

## 2021-02-14 DIAGNOSIS — M81.0 SENILE OSTEOPOROSIS: ICD-10-CM

## 2021-02-14 DIAGNOSIS — N18.30 CKD (CHRONIC KIDNEY DISEASE) STAGE 3, GFR 30-59 ML/MIN (HCC): ICD-10-CM

## 2021-02-14 DIAGNOSIS — E83.52 HYPERCALCEMIA: ICD-10-CM

## 2021-04-14 ENCOUNTER — HOSPITAL ENCOUNTER (EMERGENCY)
Age: 66
Discharge: HOME | End: 2021-04-14
Attending: EMERGENCY MEDICINE
Payer: MEDICARE

## 2021-04-14 VITALS
SYSTOLIC BLOOD PRESSURE: 125 MMHG | HEIGHT: 67 IN | DIASTOLIC BLOOD PRESSURE: 81 MMHG | HEART RATE: 80 BPM | WEIGHT: 170 LBS | RESPIRATION RATE: 18 BRPM | TEMPERATURE: 98.3 F | BODY MASS INDEX: 26.68 KG/M2 | OXYGEN SATURATION: 99 %

## 2021-04-14 DIAGNOSIS — K02.9 DENTAL CARIES: ICD-10-CM

## 2021-04-14 DIAGNOSIS — S02.5XXA CLOSED FRACTURE OF TOOTH, INITIAL ENCOUNTER: ICD-10-CM

## 2021-04-14 DIAGNOSIS — K05.10 GINGIVITIS DUE TO DENTAL PLAQUE: Primary | ICD-10-CM

## 2021-04-14 PROCEDURE — 99283 EMERGENCY DEPT VISIT LOW MDM: CPT

## 2021-04-14 PROCEDURE — 74011250637 HC RX REV CODE- 250/637: Performed by: EMERGENCY MEDICINE

## 2021-04-14 RX ORDER — IBUPROFEN 800 MG/1
800 TABLET ORAL
Status: COMPLETED | OUTPATIENT
Start: 2021-04-14 | End: 2021-04-14

## 2021-04-14 RX ORDER — PENICILLIN V POTASSIUM 250 MG/1
500 TABLET, FILM COATED ORAL
Status: COMPLETED | OUTPATIENT
Start: 2021-04-14 | End: 2021-04-14

## 2021-04-14 RX ORDER — IBUPROFEN 600 MG/1
600 TABLET ORAL
Qty: 20 TAB | Refills: 0 | Status: SHIPPED | OUTPATIENT
Start: 2021-04-14 | End: 2021-10-14

## 2021-04-14 RX ORDER — PENICILLIN V POTASSIUM 500 MG/1
500 TABLET, FILM COATED ORAL 4 TIMES DAILY
Qty: 28 TAB | Refills: 0 | Status: SHIPPED | OUTPATIENT
Start: 2021-04-14 | End: 2021-04-21

## 2021-04-14 RX ADMIN — IBUPROFEN 800 MG: 800 TABLET, FILM COATED ORAL at 16:01

## 2021-04-14 RX ADMIN — PENICILLIN V POTASIUM 500 MG: 250 TABLET ORAL at 16:01

## 2021-04-14 NOTE — ED NOTES
Rt side broken tooth causing pain radiating though the jaw starting about 3 days ago pt orajel that is not helping tooth has been broken about a month, pt unable to see dentist, unable to eat on that side of her mouth and is hypersensitive to cold liquids

## 2021-04-14 NOTE — ED PROVIDER NOTES
EMERGENCY DEPARTMENT HISTORY AND PHYSICAL EXAM      Date: 4/14/2021  Patient Name: Patricio Fernandez    History of Presenting Illness     Chief Complaint   Patient presents with    Dental Pain       History Provided By: Patient    HPI: Patricio Fernandez, 61 y.o. female with a past medical history significant hypertension presents to the ED with cc of 7/10 pain to the right lower jaw from a fractured tooth that broke 1 month ago; pain started 3 days ago; pain intensity 7/10; patient has not consulted dentist as yet; patient reports cold and hot pain sensitivity    There are no other complaints, changes, or physical findings at this time. PCP: No primary care provider on file. No current facility-administered medications on file prior to encounter. No current outpatient medications on file prior to encounter. Past History     Past Medical History:  No past medical history on file. Past Surgical History:  No past surgical history on file. Family History:  No family history on file. Social History:  Social History     Tobacco Use    Smoking status: Not on file   Substance Use Topics    Alcohol use: Not on file    Drug use: Not on file       Allergies:  Not on File      Review of Systems     Review of Systems   Constitutional: Negative for chills and fever. HENT: Positive for dental problem and facial swelling. Eyes: Negative for pain and visual disturbance. Respiratory: Negative for cough and shortness of breath. Cardiovascular: Negative for chest pain and leg swelling. Gastrointestinal: Negative for abdominal pain and vomiting. Endocrine: Negative for polydipsia and polyuria. Genitourinary: Negative for decreased urine volume and dysuria. Musculoskeletal: Negative for back pain and neck pain. Skin: Negative for color change and pallor. Neurological: Negative for weakness and numbness. Psychiatric/Behavioral: Negative.         Physical Exam     Physical Exam  Vitals signs and nursing note reviewed. Constitutional:       Appearance: Normal appearance. HENT:      Head: Normocephalic and atraumatic. Jaw: Tenderness and swelling present. Mouth/Throat:      Mouth: Mucous membranes are moist.      Dentition: Abnormal dentition. Dental tenderness and dental caries present. No gingival swelling or dental abscesses. Pharynx: Oropharynx is clear. Uvula midline. No pharyngeal swelling or oropharyngeal exudate. Eyes:      Extraocular Movements: Extraocular movements intact. Conjunctiva/sclera: Conjunctivae normal.      Pupils: Pupils are equal, round, and reactive to light. Neck:      Musculoskeletal: Normal range of motion and neck supple. Trachea: Trachea normal.   Cardiovascular:      Rate and Rhythm: Normal rate and regular rhythm. Pulses: Normal pulses. Heart sounds: Normal heart sounds. Pulmonary:      Effort: Pulmonary effort is normal.      Breath sounds: Normal breath sounds. Abdominal:      General: Bowel sounds are normal.      Palpations: Abdomen is soft. Musculoskeletal: Normal range of motion. General: No tenderness. Lymphadenopathy:      Cervical: No cervical adenopathy. Skin:     General: Skin is warm and dry. Capillary Refill: Capillary refill takes less than 2 seconds. Neurological:      General: No focal deficit present. Mental Status: She is alert and oriented to person, place, and time. Psychiatric:         Mood and Affect: Mood normal.         Behavior: Behavior normal.         Lab and Diagnostic Study Results     Labs -   No results found for this or any previous visit (from the past 12 hour(s)). Radiologic Studies -   @lastxrresult@  CT Results  (Last 48 hours)    None        CXR Results  (Last 48 hours)    None            Medical Decision Making   - I am the first provider for this patient.     - I reviewed the vital signs, available nursing notes, past medical history, past surgical history, family history and social history. - Initial assessment performed. The patients presenting problems have been discussed, and they are in agreement with the care plan formulated and outlined with them. I have encouraged them to ask questions as they arise throughout their visit. Vital Signs-Reviewed the patient's vital signs. No data found. Records Reviewed: Nursing Notes    The patient presents with dental pain with a differential diagnosis of avulsed tooth, dental abscess, dental brandon      ED Course:          Provider Notes (Medical Decision Making): MDM       Procedures   Medical Decision Makingedical Decision Making  Performed by: Juan Diego Macias MD  PROCEDURES:  Procedures       Disposition   Disposition: Condition stable and improved  DC- Adult Discharges: All of the diagnostic tests were reviewed and questions answered. Diagnosis, care plan and treatment options were discussed. The patient understands the instructions and will follow up as directed. The patients results have been reviewed with them. They have been counseled regarding their diagnosis. The patient verbally convey understanding and agreement of the signs, symptoms, diagnosis, treatment and prognosis and additionally agrees to follow up as recommended with their PCP in 24 - 48 hours. They also agree with the care-plan and convey that all of their questions have been answered. I have also put together some discharge instructions for them that include: 1) educational information regarding their diagnosis, 2) how to care for their diagnosis at home, as well a 3) list of reasons why they would want to return to the ED prior to their follow-up appointment, should their condition change. DISCHARGE PLAN:  1. There are no discharge medications for this patient. 2.   Follow-up Information    None       3. Return to ED if worse   4. There are no discharge medications for this patient.         Diagnosis     Clinical Impression: No diagnosis found. Attestations:    Dulce Sears MD    Please note that this dictation was completed with [employer] ([employer]), the computer voice recognition software. Quite often unanticipated grammatical, syntax, homophones, and other interpretive errors are inadvertently transcribed by the computer software. Please disregard these errors. Please excuse any errors that have escaped final proofreading. Thank you.

## 2021-04-14 NOTE — ED TRIAGE NOTES
Pt reports bottom right tooth and gum pain that radiates to the top jaw. Pt began two days ago. Pt tried OTC numbing gel and brushing teeth to relieve pain. Pt rates pain at 7/10.

## 2021-04-20 ENCOUNTER — HOSPITAL ENCOUNTER (EMERGENCY)
Age: 66
Discharge: HOME | End: 2021-04-20
Attending: EMERGENCY MEDICINE
Payer: MEDICARE

## 2021-04-20 ENCOUNTER — APPOINTMENT (OUTPATIENT)
Dept: VASCULAR SURGERY | Age: 66
End: 2021-04-20
Attending: EMERGENCY MEDICINE
Payer: MEDICARE

## 2021-04-20 VITALS
TEMPERATURE: 98.3 F | HEART RATE: 89 BPM | DIASTOLIC BLOOD PRESSURE: 63 MMHG | WEIGHT: 220 LBS | HEIGHT: 72 IN | SYSTOLIC BLOOD PRESSURE: 112 MMHG | RESPIRATION RATE: 20 BRPM | BODY MASS INDEX: 29.8 KG/M2 | OXYGEN SATURATION: 95 %

## 2021-04-20 DIAGNOSIS — R60.0 BILATERAL LEG EDEMA: Primary | ICD-10-CM

## 2021-04-20 PROCEDURE — 74011250637 HC RX REV CODE- 250/637: Performed by: EMERGENCY MEDICINE

## 2021-04-20 PROCEDURE — 99283 EMERGENCY DEPT VISIT LOW MDM: CPT

## 2021-04-20 PROCEDURE — 93970 EXTREMITY STUDY: CPT

## 2021-04-20 RX ORDER — IBUPROFEN 800 MG/1
800 TABLET ORAL
Status: COMPLETED | OUTPATIENT
Start: 2021-04-20 | End: 2021-04-20

## 2021-04-20 RX ORDER — IBUPROFEN 800 MG/1
800 TABLET ORAL
Qty: 20 TAB | Refills: 0 | Status: SHIPPED | OUTPATIENT
Start: 2021-04-20 | End: 2021-04-27

## 2021-04-20 RX ORDER — FUROSEMIDE 40 MG/1
40 TABLET ORAL DAILY
Status: DISCONTINUED | OUTPATIENT
Start: 2021-04-20 | End: 2021-04-20

## 2021-04-20 RX ADMIN — IBUPROFEN 800 MG: 800 TABLET, FILM COATED ORAL at 10:00

## 2021-04-20 NOTE — ED PROVIDER NOTES
EMERGENCY DEPARTMENT HISTORY AND PHYSICAL EXAM      Date: 4/20/2021  Patient Name: Yudi Johnson    History of Presenting Illness     Chief Complaint   Patient presents with    Leg Pain     pt presents with leg pain that she states has been ongoing for 1 month, pt states she does not take diuretics at home. Pt A&Ox4, ambulatory to triage. Pt VSs table, 2+ edema noted to LLE       History Provided By: Patient    HPI: Yudi Johnson, 61 y.o. female with a past medical history significant hypertension presents to the ED with cc of bilateral lower extremity swelling and pain for 1 months patient last saw PMD 1 month ago also; patient denies any changes in her medications denies ever being on any diuretics; denies chest pain shortness of breath dizziness and cough    There are no other complaints, changes, or physical findings at this time. PCP: Reji Thomason MD    No current facility-administered medications on file prior to encounter. Current Outpatient Medications on File Prior to Encounter   Medication Sig Dispense Refill    penicillin v potassium (VEETID) 500 mg tablet Take 1 Tab by mouth four (4) times daily for 7 days. 28 Tab 0    ibuprofen (MOTRIN) 600 mg tablet Take 1 Tab by mouth every six (6) hours as needed for Pain. 20 Tab 0    amLODIPine (NORVASC) 5 mg tablet Take 5 mg by mouth daily.  imipramine (TOFRANIL) 25 mg tablet Take 25 mg by mouth nightly.  cholecalciferol (Vitamin D3) (5000 Units/125 mcg) tab tablet Take  by mouth daily.  lithium carbonate 300 mg tablet Take 1 Tab by mouth two (2) times a day. One capsule in the morning and 2 capsules at bedtime      OLANZapine (ZYPREXA) 20 mg tablet Take 10 mg by mouth nightly.  topiramate (TOPAMAX) 50 mg tablet Take 1 Tab by mouth as needed.  ferrous sulfate 324 mg (65 mg iron) tablet Take  by mouth Daily (before breakfast).          Past History     Past Medical History:  Past Medical History:   Diagnosis Date    Hypertension        Past Surgical History:  Past Surgical History:   Procedure Laterality Date    HX COLONOSCOPY      HX OTHER SURGICAL      Ingrown toenail removal       Family History:  Family History   Family history unknown: Yes       Social History:  Social History     Tobacco Use    Smoking status: Current Every Day Smoker     Packs/day: 0.50    Smokeless tobacco: Never Used   Substance Use Topics    Alcohol use: Never     Frequency: Never    Drug use: Never       Allergies:  No Known Allergies      Review of Systems     Review of Systems   Constitutional: Negative for chills and fever. HENT: Negative for rhinorrhea and sore throat. Eyes: Negative for pain and visual disturbance. Respiratory: Negative for apnea, cough, shortness of breath and wheezing. Cardiovascular: Positive for leg swelling. Negative for chest pain and palpitations. Gastrointestinal: Negative for abdominal pain and vomiting. Endocrine: Negative for polydipsia and polyuria. Genitourinary: Negative for dysuria and urgency. Musculoskeletal: Negative for back pain and neck pain. Skin: Negative for color change and pallor. Neurological: Negative for weakness and numbness. Psychiatric/Behavioral: Negative. Physical Exam     Physical Exam  Vitals signs and nursing note reviewed. Constitutional:       Appearance: Normal appearance. HENT:      Head: Normocephalic and atraumatic. Mouth/Throat:      Mouth: Mucous membranes are moist.      Pharynx: Oropharynx is clear. Eyes:      Extraocular Movements: Extraocular movements intact. Conjunctiva/sclera: Conjunctivae normal.      Pupils: Pupils are equal, round, and reactive to light. Neck:      Musculoskeletal: Normal range of motion and neck supple. Cardiovascular:      Rate and Rhythm: Normal rate and regular rhythm. Pulses: Normal pulses. Heart sounds: Normal heart sounds.    Pulmonary:      Effort: Pulmonary effort is normal. Breath sounds: Normal breath sounds. Abdominal:      General: Bowel sounds are normal.      Palpations: Abdomen is soft. Musculoskeletal: Normal range of motion. General: Swelling and tenderness present. No signs of injury. Right lower leg: Edema present. Left lower leg: Edema present. Skin:     General: Skin is warm and dry. Capillary Refill: Capillary refill takes less than 2 seconds. Neurological:      General: No focal deficit present. Mental Status: She is alert and oriented to person, place, and time. Psychiatric:         Mood and Affect: Mood normal.         Behavior: Behavior normal.         Lab and Diagnostic Study Results     Labs -   No results found for this or any previous visit (from the past 12 hour(s)). Radiologic Studies -   @lastxrresult@  CT Results  (Last 48 hours)    None        CXR Results  (Last 48 hours)    None            Medical Decision Making   - I am the first provider for this patient. - I reviewed the vital signs, available nursing notes, past medical history, past surgical history, family history and social history. - Initial assessment performed. The patients presenting problems have been discussed, and they are in agreement with the care plan formulated and outlined with them. I have encouraged them to ask questions as they arise throughout their visit. Vital Signs-Reviewed the patient's vital signs.   Patient Vitals for the past 12 hrs:   Temp Pulse Resp BP SpO2   04/20/21 0940 98.3 °F (36.8 °C) 89 20 112/63 95 %       Records Reviewed: Nursing Notes    The patient presents with leg pain with a differential diagnosis of DVT, cellulitis, CHF      ED Course:     ED Course as of Apr 20 1143   Tue Apr 20, 2021   6022 Was explained to patient that she would not be getting the Lasix because her blood pressure is low and diuresis would lower her blood pressure even more and she is to follow-up with her primary care doctor for further adjustment of her blood pressure meds and possibly initiating diuretic therapy    [SB]      ED Course User Index  [SB] Yoanna Jorge MD       Provider Notes (Medical Decision Making): MDM       Procedures   Medical Decision Makingedical Decision Making  Performed by: Sean Soto MD  PROCEDURES:  Procedures       Disposition   Disposition: Condition stable and improved  DC- Adult Discharges: All of the diagnostic tests were reviewed and questions answered. Diagnosis, care plan and treatment options were discussed. The patient understands the instructions and will follow up as directed. The patients results have been reviewed with them. They have been counseled regarding their diagnosis. The patient verbally convey understanding and agreement of the signs, symptoms, diagnosis, treatment and prognosis and additionally agrees to follow up as recommended with their PCP in 24 - 48 hours. They also agree with the care-plan and convey that all of their questions have been answered. I have also put together some discharge instructions for them that include: 1) educational information regarding their diagnosis, 2) how to care for their diagnosis at home, as well a 3) list of reasons why they would want to return to the ED prior to their follow-up appointment, should their condition change. DISCHARGE PLAN:  1. Current Discharge Medication List      CONTINUE these medications which have NOT CHANGED    Details   penicillin v potassium (VEETID) 500 mg tablet Take 1 Tab by mouth four (4) times daily for 7 days. Qty: 28 Tab, Refills: 0      ibuprofen (MOTRIN) 600 mg tablet Take 1 Tab by mouth every six (6) hours as needed for Pain. Qty: 20 Tab, Refills: 0      amLODIPine (NORVASC) 5 mg tablet Take 5 mg by mouth daily. imipramine (TOFRANIL) 25 mg tablet Take 25 mg by mouth nightly. cholecalciferol (Vitamin D3) (5000 Units/125 mcg) tab tablet Take  by mouth daily.       lithium carbonate 300 mg tablet Take 1 Tab by mouth two (2) times a day. One capsule in the morning and 2 capsules at bedtime    Associated Diagnoses: Senile osteoporosis; Hypercalcemia; Multinodular goiter      OLANZapine (ZYPREXA) 20 mg tablet Take 10 mg by mouth nightly. Associated Diagnoses: Senile osteoporosis; Hypercalcemia; Multinodular goiter      topiramate (TOPAMAX) 50 mg tablet Take 1 Tab by mouth as needed. Associated Diagnoses: Senile osteoporosis; Hypercalcemia; Multinodular goiter      ferrous sulfate 324 mg (65 mg iron) tablet Take  by mouth Daily (before breakfast). Associated Diagnoses: Senile osteoporosis; Hypercalcemia; Multinodular goiter           2. Follow-up Information     Follow up With Specialties Details Why Contact [facility]    Call and make an appointment        3. Return to ED if worse   4. Current Discharge Medication List            Diagnosis     Clinical Impression: No diagnosis found. Attestations:    Ijeoma Bolton MD    Please note that this dictation was completed with [employer], the computer voice recognition software. Quite often unanticipated grammatical, syntax, homophones, and other interpretive errors are inadvertently transcribed by the computer software. Please disregard these errors. Please excuse any errors that have escaped final proofreading. Thank you.

## 2021-04-20 NOTE — ED TRIAGE NOTES
.  Chief Complaint   Patient presents with    Leg Pain     pt presents with leg pain that she states has been ongoing for 1 month, pt states she does not take diuretics at home. Pt A&Ox4, ambulatory to triage.  Pt VSs table, 2+ edema noted to LLE

## 2021-05-15 ENCOUNTER — APPOINTMENT (OUTPATIENT)
Dept: GENERAL RADIOLOGY | Age: 66
End: 2021-05-15
Attending: EMERGENCY MEDICINE
Payer: MEDICARE

## 2021-05-15 ENCOUNTER — HOSPITAL ENCOUNTER (EMERGENCY)
Age: 66
Discharge: HOME | End: 2021-05-15
Attending: EMERGENCY MEDICINE
Payer: MEDICARE

## 2021-05-15 VITALS
HEIGHT: 67 IN | SYSTOLIC BLOOD PRESSURE: 128 MMHG | BODY MASS INDEX: 25.11 KG/M2 | WEIGHT: 160 LBS | OXYGEN SATURATION: 97 % | DIASTOLIC BLOOD PRESSURE: 82 MMHG | RESPIRATION RATE: 20 BRPM | HEART RATE: 65 BPM

## 2021-05-15 DIAGNOSIS — K59.09 OTHER CONSTIPATION: Primary | ICD-10-CM

## 2021-05-15 LAB
ALBUMIN SERPL-MCNC: 3.9 G/DL (ref 3.5–5)
ALBUMIN/GLOB SERPL: 1 {RATIO} (ref 1.1–2.2)
ALP SERPL-CCNC: 93 U/L (ref 45–117)
ALT SERPL-CCNC: 23 U/L (ref 12–78)
ANION GAP SERPL CALC-SCNC: 8 MMOL/L (ref 5–15)
AST SERPL W P-5'-P-CCNC: 16 U/L (ref 15–37)
BASOPHILS # BLD: 0.1 K/UL (ref 0–0.2)
BASOPHILS NFR BLD: 1 % (ref 0–2.5)
BILIRUB SERPL-MCNC: 0.7 MG/DL (ref 0.2–1)
BUN SERPL-MCNC: 8 MG/DL (ref 6–20)
BUN/CREAT SERPL: 5 (ref 12–20)
CA-I BLD-MCNC: 10 MG/DL (ref 8.5–10.1)
CHLORIDE SERPL-SCNC: 106 MMOL/L (ref 97–108)
CO2 SERPL-SCNC: 29 MMOL/L (ref 21–32)
CREAT SERPL-MCNC: 1.53 MG/DL (ref 0.55–1.02)
EOSINOPHIL # BLD: 0.2 K/UL (ref 0–0.7)
EOSINOPHIL NFR BLD: 2 % (ref 0.9–2.9)
ERYTHROCYTE [DISTWIDTH] IN BLOOD BY AUTOMATED COUNT: 14 % (ref 11.5–14.5)
GLOBULIN SER CALC-MCNC: 3.9 G/DL (ref 2–4)
GLUCOSE SERPL-MCNC: 95 MG/DL (ref 65–100)
HCT VFR BLD AUTO: 37.7 % (ref 36–46)
HGB BLD-MCNC: 12.4 G/DL (ref 13.5–17.5)
LYMPHOCYTES # BLD: 1.4 K/UL (ref 1–4.8)
LYMPHOCYTES NFR BLD: 19 % (ref 20.5–51.1)
MCH RBC QN AUTO: 31.9 PG (ref 31–34)
MCHC RBC AUTO-ENTMCNC: 32.8 G/DL (ref 31–36)
MCV RBC AUTO: 97 FL (ref 80–100)
MONOCYTES # BLD: 0.6 K/UL (ref 0.2–2.4)
MONOCYTES NFR BLD: 8 % (ref 1.7–9.3)
NEUTS SEG # BLD: 5.5 K/UL (ref 1.8–7.7)
NEUTS SEG NFR BLD: 70 % (ref 42–75)
NRBC # BLD: 0 K/UL
NRBC BLD-RTO: 0 PER 100 WBC
PLATELET # BLD AUTO: 264 K/UL (ref 150–400)
PMV BLD AUTO: 8.9 FL (ref 6.5–11.5)
POTASSIUM SERPL-SCNC: 3.6 MMOL/L (ref 3.5–5.1)
PROT SERPL-MCNC: 7.8 G/DL (ref 6.4–8.2)
RBC # BLD AUTO: 3.89 M/UL (ref 4.5–5.9)
SODIUM SERPL-SCNC: 143 MMOL/L (ref 136–145)
WBC # BLD AUTO: 7.7 K/UL (ref 4.4–11.3)

## 2021-05-15 PROCEDURE — 99282 EMERGENCY DEPT VISIT SF MDM: CPT

## 2021-05-15 PROCEDURE — 74018 RADEX ABDOMEN 1 VIEW: CPT

## 2021-05-15 PROCEDURE — 80053 COMPREHEN METABOLIC PANEL: CPT

## 2021-05-15 PROCEDURE — 85025 COMPLETE CBC W/AUTO DIFF WBC: CPT

## 2021-05-15 PROCEDURE — 36415 COLL VENOUS BLD VENIPUNCTURE: CPT

## 2021-05-15 RX ORDER — DOCUSATE SODIUM 100 MG/1
100 CAPSULE, LIQUID FILLED ORAL 2 TIMES DAILY
Qty: 60 CAP | Refills: 2 | Status: SHIPPED | OUTPATIENT
Start: 2021-05-15 | End: 2021-08-13

## 2021-05-15 RX ORDER — MAGNESIUM CITRATE
296 SOLUTION, ORAL ORAL
Status: DISCONTINUED | OUTPATIENT
Start: 2021-05-15 | End: 2021-05-15 | Stop reason: HOSPADM

## 2021-05-15 NOTE — ED PROVIDER NOTES
EMERGENCY DEPARTMENT HISTORY AND PHYSICAL EXAM      Date: 5/15/2021  Patient Name: Amy Acosta    History of Presenting Illness     No chief complaint on file. History Provided By: Patient    HPI: Amy Acosta, 61 y.o. female with a past medical history significant hypertension presents to the ED with cc of constipation for a week. Patient denies nausea vomiting abdominal pain previous history of constipation. Previous history of constipation. Placed on medication by   Patient got better. no other complaints, changes, or physical findings at this time. PCP: Other, MD Reji    No current facility-administered medications on file prior to encounter. Current Outpatient Medications on File Prior to Encounter   Medication Sig Dispense Refill    ibuprofen (MOTRIN) 600 mg tablet Take 1 Tab by mouth every six (6) hours as needed for Pain. 20 Tab 0    amLODIPine (NORVASC) 5 mg tablet Take 5 mg by mouth daily.  imipramine (TOFRANIL) 25 mg tablet Take 25 mg by mouth nightly.  cholecalciferol (Vitamin D3) (5000 Units/125 mcg) tab tablet Take  by mouth daily.  lithium carbonate 300 mg tablet Take 1 Tab by mouth two (2) times a day. One capsule in the morning and 2 capsules at bedtime      OLANZapine (ZYPREXA) 20 mg tablet Take 10 mg by mouth nightly.  topiramate (TOPAMAX) 50 mg tablet Take 1 Tab by mouth as needed.  ferrous sulfate 324 mg (65 mg iron) tablet Take  by mouth Daily (before breakfast).          Past History     Past Medical History:  Past Medical History:   Diagnosis Date    Hypertension        Past Surgical History:  Past Surgical History:   Procedure Laterality Date    HX COLONOSCOPY      HX OTHER SURGICAL      Ingrown toenail removal       Family History:  Family History   Family history unknown: Yes       Social History:  Social History     Tobacco Use    Smoking status: Current Every Day Smoker     Packs/day: 0.50    Smokeless tobacco: Never Used Substance Use Topics    Alcohol use: Never     Frequency: Never    Drug use: Never       Allergies:  No Known Allergies      Review of Systems   Review of Systems   Constitutional: Negative. HENT: Negative. Eyes: Negative. Respiratory: Negative. Cardiovascular: Negative. Gastrointestinal: Positive for constipation. Negative for abdominal distention, abdominal pain, anal bleeding, blood in stool, diarrhea, nausea, rectal pain and vomiting. Endocrine: Negative. Genitourinary: Negative. Musculoskeletal: Negative. Skin: Negative. Allergic/Immunologic: Negative. Neurological: Negative. Hematological: Negative. Psychiatric/Behavioral: Negative. Physical Exam   Physical Exam  Vitals signs and nursing note reviewed. Constitutional:       Appearance: Normal appearance. HENT:      Head: Normocephalic. Right Ear: Tympanic membrane normal.      Left Ear: Tympanic membrane normal.      Nose: Nose normal.      Mouth/Throat:      Mouth: Mucous membranes are moist.   Eyes:      Pupils: Pupils are equal, round, and reactive to light. Neck:      Musculoskeletal: Normal range of motion and neck supple. Cardiovascular:      Rate and Rhythm: Normal rate. Pulses: Normal pulses. Pulmonary:      Effort: Pulmonary effort is normal.   Abdominal:      General: Abdomen is flat. Bowel sounds are normal.      Palpations: Abdomen is soft. Comments: obese   Musculoskeletal: Normal range of motion. Skin:     General: Skin is warm. Capillary Refill: Capillary refill takes less than 2 seconds. Neurological:      General: No focal deficit present. Mental Status: She is alert. Psychiatric:         Mood and Affect: Mood normal.         Lab and Diagnostic Study Results     Labs -   No results found for this or any previous visit (from the past 12 hour(s)).     Radiologic Studies -   @lastxrresult@  CT Results  (Last 48 hours)    None        CXR Results  (Last 48 hours)    None            Medical Decision Making   - I am the first provider for this patient. - I reviewed the vital signs, available nursing notes, past medical history, past surgical history, family history and social history. - Initial assessment performed. The patients presenting problems have been discussed, and they are in agreement with the care plan formulated and outlined with them. I have encouraged them to ask questions as they arise throughout their visit. Vital Signs-Reviewed the patient's vital signs. No data found. Records Reviewed: Nursing Notes    The patient presents with constipation with a differential diagnosis of biliary colic, cholecystitis, diverticulitis, gastritis, gastroenteritis, GERD, obstruction, renal Colic and constipation. ED Course: 62 yo female with a history of constipation. Presents with constipation for for over a week. Patient lab work was all within normal limits KUB showed colonic impaction. Patient was given magnesium citrate to drink upon arrival at home. She also was placed on Colace for control of constipation. Provider Notes (Medical Decision Making): MDM       Procedures   Medical Decision Makingedical Decision Making  Performed by: Ale Willoughby MD  PROCEDURESProcedures       Disposition   Disposition: DC- Adult Discharges: All of the diagnostic tests were reviewed and questions answered. Diagnosis, care plan and treatment options were discussed. The patient understands the instructions and will follow up as directed. The patients results have been reviewed with them. They have been counseled regarding their diagnosis. The patient and counselor/therapist verbally convey understanding and agreement of the signs, symptoms, diagnosis, treatment and prognosis and additionally agrees to follow up as recommended with their PCP in 24 - 48 hours.   They also agree with the care-plan and convey that all of their questions have been answered. I have also put together some discharge instructions for them that include: 1) educational information regarding their diagnosis, 2) how to care for their diagnosis at home, as well a 3) list of reasons why they would want to return to the ED prior to their follow-up appointment, should their condition change. DISCHARGE PLAN:  1. Current Discharge Medication List      CONTINUE these medications which have NOT CHANGED    Details   ibuprofen (MOTRIN) 600 mg tablet Take 1 Tab by mouth every six (6) hours as needed for Pain. Qty: 20 Tab, Refills: 0      amLODIPine (NORVASC) 5 mg tablet Take 5 mg by mouth daily. imipramine (TOFRANIL) 25 mg tablet Take 25 mg by mouth nightly. cholecalciferol (Vitamin D3) (5000 Units/125 mcg) tab tablet Take  by mouth daily. lithium carbonate 300 mg tablet Take 1 Tab by mouth two (2) times a day. One capsule in the morning and 2 capsules at bedtime    Associated Diagnoses: Senile osteoporosis; Hypercalcemia; Multinodular goiter      OLANZapine (ZYPREXA) 20 mg tablet Take 10 mg by mouth nightly. Associated Diagnoses: Senile osteoporosis; Hypercalcemia; Multinodular goiter      topiramate (TOPAMAX) 50 mg tablet Take 1 Tab by mouth as needed. Associated Diagnoses: Senile osteoporosis; Hypercalcemia; Multinodular goiter      ferrous sulfate 324 mg (65 mg iron) tablet Take  by mouth Daily (before breakfast). Associated Diagnoses: Senile osteoporosis; Hypercalcemia; Multinodular goiter           2. Follow-up Information    None       3. Return to ED if worse   4. Current Discharge Medication List            Diagnosis     Clinical Impression:    ICD-10-CM ICD-9-CM    1. Other constipation  K59.09 564.09        Katharine Mcgowan MD    Please note that this dictation was completed with [employer], the [employer] voice recognition software.   Quite often unanticipated grammatical, syntax, homophones, and other interpretive errors are inadvertently transcribed by the computer software. Please disregard these errors. Please excuse any errors that have escaped final proofreading. Thank you.

## 2021-05-15 NOTE — ED TRIAGE NOTES
.  Chief Complaint   Patient presents with    Constipation     Pt states that she has been constipated for 1 week, tried fleets enema at home w/o relief. Recently seen 1 month ago by PCP and given laxative but stopped taking it due to not thinking that she needed it. Pt states since then she has been constipated, not passing gas.  Pt denies abdominal pain, N/V

## 2021-05-17 ENCOUNTER — TRANSCRIBE ORDER (OUTPATIENT)
Dept: REGISTRATION | Age: 66
End: 2021-05-17

## 2021-05-17 ENCOUNTER — HOSPITAL ENCOUNTER (OUTPATIENT)
Dept: LAB | Age: 66
Discharge: HOME | End: 2021-05-17
Payer: MEDICARE

## 2021-05-17 DIAGNOSIS — E83.52 HYPERCALCEMIA: ICD-10-CM

## 2021-05-17 DIAGNOSIS — E83.9 DISORDER OF MINERAL METABOLISM: ICD-10-CM

## 2021-05-17 DIAGNOSIS — N18.30 CHRONIC KIDNEY DISEASE, STAGE III (MODERATE) (HCC): ICD-10-CM

## 2021-05-17 DIAGNOSIS — I10 ESSENTIAL HYPERTENSION, MALIGNANT: ICD-10-CM

## 2021-05-17 DIAGNOSIS — N18.30 CHRONIC KIDNEY DISEASE, STAGE III (MODERATE) (HCC): Primary | ICD-10-CM

## 2021-05-17 LAB
ALBUMIN SERPL-MCNC: 3.8 G/DL (ref 3.5–5)
ANION GAP SERPL CALC-SCNC: 7 MMOL/L (ref 5–15)
BUN SERPL-MCNC: 11 MG/DL (ref 6–20)
BUN/CREAT SERPL: 8 (ref 12–20)
CA-I BLD-MCNC: 10.1 MG/DL (ref 8.5–10.1)
CHLORIDE SERPL-SCNC: 106 MMOL/L (ref 97–108)
CO2 SERPL-SCNC: 30 MMOL/L (ref 21–32)
CREAT SERPL-MCNC: 1.42 MG/DL (ref 0.55–1.02)
CREAT UR-MCNC: 15.7 MG/DL
CREAT UR-MCNC: 16.84 MG/DL
GLUCOSE SERPL-MCNC: 103 MG/DL (ref 65–100)
MICROALBUMIN UR-MCNC: 0.51 MG/DL
MICROALBUMIN/CREAT UR-RTO: 32 MGMALB/GCRE (ref 0–30)
PHOSPHATE SERPL-MCNC: 3.3 MG/DL (ref 2.6–4.7)
POTASSIUM SERPL-SCNC: 3.8 MMOL/L (ref 3.5–5.1)
PROT UR-MCNC: ABNORMAL MG/DL (ref 0–11.9)
PROT/CREAT UR-RTO: ABNORMAL
SODIUM SERPL-SCNC: 143 MMOL/L (ref 136–145)

## 2021-05-17 PROCEDURE — 82306 VITAMIN D 25 HYDROXY: CPT

## 2021-05-17 PROCEDURE — 36415 COLL VENOUS BLD VENIPUNCTURE: CPT

## 2021-05-17 PROCEDURE — 83970 ASSAY OF PARATHORMONE: CPT

## 2021-05-17 PROCEDURE — 80069 RENAL FUNCTION PANEL: CPT

## 2021-05-17 PROCEDURE — 82043 UR ALBUMIN QUANTITATIVE: CPT

## 2021-05-17 PROCEDURE — 84156 ASSAY OF PROTEIN URINE: CPT

## 2021-05-18 LAB
25(OH)D3 SERPL-MCNC: 40.7 NG/ML (ref 30–100)
CA-I BLD-MCNC: 10.1 MG/DL (ref 8.5–10.1)
PTH-INTACT SERPL-MCNC: 133.5 PG/ML (ref 18.4–88)

## 2021-06-21 ENCOUNTER — OFFICE VISIT (OUTPATIENT)
Dept: ENDOCRINOLOGY | Age: 66
End: 2021-06-21
Payer: MEDICARE

## 2021-06-21 VITALS
RESPIRATION RATE: 16 BRPM | WEIGHT: 201.6 LBS | SYSTOLIC BLOOD PRESSURE: 125 MMHG | BODY MASS INDEX: 31.64 KG/M2 | HEART RATE: 70 BPM | OXYGEN SATURATION: 98 % | DIASTOLIC BLOOD PRESSURE: 70 MMHG | HEIGHT: 67 IN

## 2021-06-21 DIAGNOSIS — E04.2 MULTINODULAR GOITER: Primary | ICD-10-CM

## 2021-06-21 PROCEDURE — 3017F COLORECTAL CA SCREEN DOC REV: CPT | Performed by: INTERNAL MEDICINE

## 2021-06-21 PROCEDURE — G8427 DOCREV CUR MEDS BY ELIG CLIN: HCPCS | Performed by: INTERNAL MEDICINE

## 2021-06-21 PROCEDURE — G8752 SYS BP LESS 140: HCPCS | Performed by: INTERNAL MEDICINE

## 2021-06-21 PROCEDURE — 99214 OFFICE O/P EST MOD 30 MIN: CPT | Performed by: INTERNAL MEDICINE

## 2021-06-21 PROCEDURE — G8754 DIAS BP LESS 90: HCPCS | Performed by: INTERNAL MEDICINE

## 2021-06-21 PROCEDURE — G8417 CALC BMI ABV UP PARAM F/U: HCPCS | Performed by: INTERNAL MEDICINE

## 2021-06-21 PROCEDURE — G8432 DEP SCR NOT DOC, RNG: HCPCS | Performed by: INTERNAL MEDICINE

## 2021-06-21 NOTE — PROGRESS NOTES
HISTORY OF PRESENT ILLNESS  Janki Hutton is a 61 y.o. female. f/u after  Last visit for thyroid goiter and hypercalcemia   From August 2020     C/o feet pain     August 2020       She looked drowsy - appeared confused   She thought I am her nephrologist   Forgot why she was seeing me       June 2019       PT HAD LABS DONE AGAIN PER MY REQUEST AND IS HERE TO DISCUSS       Old history :    Last seen June 2017   Pt had FNA done in jan 2018   Negative for cancer   Pt had undergone parathyroid study  In nov 2018 for hypercalcemia eval  - done by nephrologist           Old history   Referred by Dr. Deepali Molina   Patient is not a good historian   She denies any hyper or hypothyroid symptoms   She denies any compressive symptoms   Past medical history : she has psychiatric issues/ schizophrenia  - on lithium and topiramate           Review of Systems   Feet pain /issues     Physical Exam   Constitutional: She is oriented to person, place, and time. She appears well-developed and well-nourished. Psychiatric: She has a normal mood and affect. Lab Results   Component Value Date/Time    GFR est non-AA 37 (L) 05/17/2021 10:46 AM    GFR est AA 45 (L) 05/17/2021 10:46 AM    Creatinine 1.42 (H) 05/17/2021 10:46 AM    BUN 11 05/17/2021 10:46 AM    Sodium 143 05/17/2021 10:46 AM    Potassium 3.8 05/17/2021 10:46 AM    Chloride 106 05/17/2021 10:46 AM    CO2 30 05/17/2021 10:46 AM    Phosphorus 3.3 05/17/2021 10:46 AM    PTH, Intact 133.5 (H) 05/17/2021 10:46 AM     Lab Results   Component Value Date/Time    TSH 2.390 06/12/2019 09:20 AM    T4, Free 1.07 06/12/2019 09:20 AM            ASSESSMENT and PLAN      1. Foot pain is being addressed by ortho/podiatrist     2.  Multinodular goiter   usg : July 2017    Left side upper nodule is thyroid nodule  And   Lower nodule is 1.8 cm seems parathyroid nodule   FNA  Done in jan 2018  On left lobe upper nodule -    Negative for cancer     She is asymptomatic   Ordered thyroid ultrasound  - heterogenous  Thyroid gland - no   Nodules         2. Hypercalcemia :  hyperparathyroidism,  AND SHE HAS BOTH SECONDARY AND PRIMARY HYPERPARATHYRODISM    HER NEPHROLOGIST IS DR. Annetta Herron   The parathyroid scan at [address] [place] [address]  Showed positivity on left inferior pole matching to the location of  parathyrodi nodule on usg done at [facility]   She can go for parathyroidectomy,  but pt deferred it       3. Osteoporosis :   Date : july 2017  Bone DEXA  ap spine T-score 1.5 ; left femoral Neck T- score  -0.7 , right femoral neck T-score n/ a  Date : august 2020   Bone DEXA  ap spine T-score 1.1 ; left femoral Neck T- score -0.9 , right femoral neck T-score n/a*    BONE LOSS COULD BE RENAL OSTEODYSTROPHY VERSUS OSTEOPOROSIS FROM PRIMAY AND SECONDARY REASONS       4.  ON LITHIUM FOR BIPOLAR DISEASE     Reviewed results with patient and discussed the labs being ordered today/bnv  Patient voiced understanding of plan of care

## 2021-06-21 NOTE — PROGRESS NOTES
Room: 1    Identified pt with two pt identifiers(name and ). Reviewed record in preparation for visit and have obtained necessary documentation. All patient medications has been reviewed. Chief Complaint   Patient presents with    Follow-up     Thyroid and Hypercalcemia     Foot Pain       Health Maintenance Due   Topic    Hepatitis C Screening     Pneumococcal 0-64 years (1 of 2 - PPSV23)    COVID-19 Vaccine (1)    DTaP/Tdap/Td series (1 - Tdap)    PAP AKA CERVICAL CYTOLOGY     Shingrix Vaccine Age 49> (1 of 2)    Colorectal Cancer Screening Combo     Breast Cancer Screen Mammogram     Medicare Yearly Exam        Vitals:    21 1526   BP: 125/70   Pulse: 70   Resp: 16   SpO2: 98%   Weight: 201 lb 9.6 oz (91.4 kg)   Height: 5' 7\" (1.702 m)   PainSc:   9       4. Have you been to the ER, urgent care clinic since your last visit? Hospitalized since your last visit? No    5. Have you seen or consulted any other health care providers outside of the [address] [place] [address] since your last visit? Include any pap smears or colon screening. No        Patient is accompanied by self I have received verbal consent from David Hauser to discuss any/all medical information while they are present in the room.

## 2021-06-21 NOTE — PATIENT INSTRUCTIONS
SPECIFIC INSTRUCTIONS BELOW       No meds     -------------PAY ATTENTION TO THESE GENERAL INSTRUCTIONS -----------------    -ANY tests other than blood work, which you opt to do  outside the  [facility] imaging facilities, you are responsible for prior authorizations if  required    - [address] UP TO DATE ON YOUR AVS- PLEASE IGNORE   - YOUR MED LIST IS NOT UP TO DATE AS SOME CHANGES ARE BEING MADE AFTER THE VISIT - [address]     Results     *Normal results will not be notified by a phone call starting January 1 2021   *If you have an upcoming visit, the results will be discussed at the visit   *Please sign up for MY CHART if you want access to your lab and test results  *Abnormal results which require immediate attention will be notified by phone call   *Abnormal results which do not require immediate assistance will be notified in 1-2 weeks       Refills    -    have your pharmacy send us a refill request . Refills are done max for one year and a visit is a must before refills are extended    Follow up appointments -  highly encourage you to make it when you are checking out. We can accommodate you into the schedule based on your clinical situation, but not for extending refills beyond a year. Labs are important to give refills and is important to get labs before the visit     Phone calls  -  Allow  24 hrs.  for non-urgent calls to be returned  Prior authorization - It may take 2-4 weeks to process  Forms  -  FMLA, DMV etc., will take up to 2 weeks to process  Cancellations - please notify the office 2 days in advance   Samples  - will only be dispensed at visits       If not showing for the appointments and cancelling appointments within 24 hours are kept track of and three  of such situations in  two consecutive years will likely be considered for termination from the practice    -------------------------------------------------------------------------------------------------------------------

## 2021-06-28 ENCOUNTER — OFFICE VISIT (OUTPATIENT)
Dept: SURGERY | Age: 66
End: 2021-06-28
Payer: MEDICARE

## 2021-06-28 VITALS
DIASTOLIC BLOOD PRESSURE: 65 MMHG | SYSTOLIC BLOOD PRESSURE: 133 MMHG | HEIGHT: 67 IN | TEMPERATURE: 98.2 F | BODY MASS INDEX: 32.11 KG/M2 | HEART RATE: 85 BPM | WEIGHT: 204.6 LBS | RESPIRATION RATE: 16 BRPM | OXYGEN SATURATION: 96 %

## 2021-06-28 DIAGNOSIS — I87.303 CHRONIC VENOUS HYPERTENSION INVOLVING BOTH SIDES: Primary | ICD-10-CM

## 2021-06-28 PROCEDURE — G8754 DIAS BP LESS 90: HCPCS | Performed by: SURGERY

## 2021-06-28 PROCEDURE — G8510 SCR DEP NEG, NO PLAN REQD: HCPCS | Performed by: SURGERY

## 2021-06-28 PROCEDURE — G8417 CALC BMI ABV UP PARAM F/U: HCPCS | Performed by: SURGERY

## 2021-06-28 PROCEDURE — G8427 DOCREV CUR MEDS BY ELIG CLIN: HCPCS | Performed by: SURGERY

## 2021-06-28 PROCEDURE — 3017F COLORECTAL CA SCREEN DOC REV: CPT | Performed by: SURGERY

## 2021-06-28 PROCEDURE — 99203 OFFICE O/P NEW LOW 30 MIN: CPT | Performed by: SURGERY

## 2021-06-28 PROCEDURE — G8752 SYS BP LESS 140: HCPCS | Performed by: SURGERY

## 2021-06-28 NOTE — PROGRESS NOTES
Chief Complaint   Patient presents with    New Patient     Presents for venous insufficiency both feet and ankles. Visit Vitals  /65 (BP 1 Location: Left arm, BP Patient Position: Sitting)   Pulse 85   Temp 98.2 °F (36.8 °C) (Temporal)   Resp 16   Ht 5' 7\" (1.702 m)   Wt 204 lb 9.6 oz (92.8 kg)   SpO2 96%   BMI 32.04 kg/m²     1. Have you been to the ER, urgent care clinic since your last visit? Hospitalized since your last visit? No    2. Have you seen or consulted any other health care providers outside of the [address] [place] [address] since your last visit? Include any pap smears or colon screening.  No

## 2021-06-30 NOTE — PROGRESS NOTES
VASCULAR history and physical.      Subjective:   CHIEF COMPLAINTS:  Leg swelling  PRESENTATION OF ILLNESS:  Ms. Carolyn Parikh is a very pleasant 61 years woman with a history of chronic leg swelling recently has gotten worse with aches and burning sensation both legs. Patient denies recent trauma. Patient is otherwise pretty active person. Patient denies any previous history of deep vein thrombosis or family history of deep vein thrombosis or clotting disorder. Her swelling is moderate. She is currently not wearing compression stocking however she does have a compression stockings available at home. Patient denies recent history of MI, stroke. Patient does have a history of hypertension. Patient has no previous vascular work-up done such as ultrasonogram of venous structures. Past Medical History:   Diagnosis Date    Hypertension     Psychotic disorder ([facility] [address]     Schizophrenia      Past Surgical History:   Procedure Laterality Date    HX COLONOSCOPY      HX OTHER SURGICAL      Ingrown toenail removal     Family History   Problem Relation Age of Onset    No Known Problems Mother     No Known Problems Father       Social History     Tobacco Use    Smoking status: Current Every Day Smoker     Packs/day: 0.50     Years: 45.00     Pack years: 22.50    Smokeless tobacco: Never Used   Substance Use Topics    Alcohol use: Not Currently       Prior to Admission medications    Medication Sig Start Date End Date Taking? Authorizing Provider   ibuprofen (MOTRIN) 600 mg tablet Take 1 Tab by mouth every six (6) hours as needed for Pain. 4/14/21  Yes Heather Sims MD   amLODIPine (NORVASC) 5 mg tablet Take 5 mg by mouth daily. Yes Provider, Historical   imipramine (TOFRANIL) 25 mg tablet Take 25 mg by mouth nightly. Yes Provider, Historical   lithium carbonate 300 mg tablet Take 1 Tab by mouth two (2) times a day.  One capsule in the morning and 2 capsules at bedtime 6/6/17  Yes Provider, Historical OLANZapine (ZYPREXA) 20 mg tablet Take 10 mg by mouth nightly. 5/14/17  Yes Provider, Historical   docusate sodium (Colace) 100 mg capsule Take 1 Cap by mouth two (2) times a day for 90 days. Patient taking differently: Take 100 mg by mouth. Every 3 days 5/15/21 8/13/21  Neal Watts MD     No Known Allergies     Review of Systems:  I reviewed the rest of organ systems personally and they were negative signed by Dr. Nuno Aguirre    Objective:     Visit Vitals  /65 (BP 1 Location: Left arm, BP Patient Position: Sitting)   Pulse 85   Temp 98.2 °F (36.8 °C) (Temporal)   Resp 16   Ht 5' 7\" (1.702 m)   Wt 204 lb 9.6 oz (92.8 kg)   SpO2 96%   BMI 32.04 kg/m²     VITAL SIGNS REVIEWED. Physical Exam:  Patient is well-nourished pleasant in conversation is appropriate. Head and neck examination atraumatic, normocephalic. Gaze appropriate. Conversation appropriate. Neck examination shows supple. No mass. No obvious carotid bruit. Chest examination shows lungs are clear bilaterally well-expanded, no crackles or wheezes. Cardiovascular system regular rate, no obvious murmur. Skin warm to touch  and moist, no skin lesions. Abdomen is soft ,not tender or distended bowel sounds present. No palpable mass. Neurological examinations, no focal neuro deficits moving all 4 extremities. Cranial nerves intact. Sensation is intact as well. Hematologic: No obvious bruise or swelling or obvious lymphadenopathy. Psychosocial: Appropriate. Has good effect. Musculoskeletal system: No muscle wasting, appropriate movements upper and lower extremity. Vascular examination: Patient does have mild to moderate swelling extending from the ankle to the lower calf level. Palpable pedal pulse noted. Foot is warm to touch bilaterally well-perfused. Doppler interrogation was performed as well. Data Review:   No visits with results within 1 Month(s) from this visit.    Latest known visit with results is:   [facility] Outpatient Visit on 05/17/2021   Component Date Value Ref Range Status    Sodium 05/17/2021 143  136 - 145 mmol/L Final    Potassium 05/17/2021 3.8  3.5 - 5.1 mmol/L Final    Chloride 05/17/2021 106  97 - 108 mmol/L Final    CO2 05/17/2021 30  21 - 32 mmol/L Final    Anion gap 05/17/2021 7  5 - 15 mmol/L Final    Glucose 05/17/2021 103* 65 - 100 mg/dL Final    BUN 05/17/2021 11  6 - 20 mg/dL Final    Creatinine 05/17/2021 1.42* 0.55 - 1.02 mg/dL Final    BUN/Creatinine ratio 05/17/2021 8* 12 - 20   Final    GFR est AA 05/17/2021 45* >60 ml/min/1.73m2 Final    GFR est non-AA 05/17/2021 37* >60 ml/min/1.73m2 Final    Calcium 05/17/2021 10.1  8.5 - 10.1 mg/dL Final    Phosphorus 05/17/2021 3.3  2.6 - 4.7 mg/dL Final    Albumin 05/17/2021 3.8  3.5 - 5.0 g/dL Final    Calcium 05/17/2021 10.1  8.5 - 10.1 mg/dL Final    PTH, Intact 05/17/2021 133.5* 18.4 - 88.0 pg/mL Final    Vitamin D 25-Hydroxy 05/17/2021 40.7  30 - 100 ng/mL Final    Protein, urine random 05/17/2021 REPEATED  0.0 - 11.9 mg/dL Final    Creatinine, urine 05/17/2021 16.84* No reference range has been established. mg/dL Final    Protein/Creat. urine Ratio 05/17/2021 Not calculated    Final    Microalbumin,urine random 05/17/2021 0.51  mg/dL Final    Creatinine, urine 05/17/2021 15.70  mg/dL Final    Microalbumin/Creat ratio (mg/g cre* 05/17/2021 32* 0 - 30 mgMALB/gCRE Final        Assessment:     Problem List Items Addressed This Visit     None      Visit Diagnoses     Chronic venous hypertension involving both sides    -  Primary    Relevant Orders    DUPLEX LOWER EXT VENOUS BILAT              Plan:     Patient does have a CEAP C3 classification for venous disorder as well. Her symptoms with the exam burning sensation as well probably related neuropathy as well. We will get venous duplex ultrasound examination to make sure there is no venous reflux disorder or deep vein thrombosis. I scheduled for this test next week.   Patient will be reevaluated about a month. Meanwhile patient was advised to wear compression stockings she has. If her symptoms does not improve with conservative management we will try low-dose gabapentin for possible suspected neuropathy as well. I will reevaluate her again in 1 month follow-up.         Jett Kirkpatrick MD

## 2021-07-09 ENCOUNTER — HOSPITAL ENCOUNTER (OUTPATIENT)
Dept: VASCULAR SURGERY | Age: 66
Discharge: HOME | End: 2021-07-09
Attending: SURGERY
Payer: MEDICARE

## 2021-07-09 DIAGNOSIS — I87.303 CHRONIC VENOUS HYPERTENSION INVOLVING BOTH SIDES: ICD-10-CM

## 2021-07-09 PROCEDURE — 93970 EXTREMITY STUDY: CPT

## 2021-07-23 ENCOUNTER — TELEPHONE (OUTPATIENT)
Dept: SURGERY | Age: 66
End: 2021-07-23

## 2021-07-23 NOTE — TELEPHONE ENCOUNTER
Patient called today regarding her legs. She said that she had a test at [facility] and has not heard from anyone with results. She stated that she is has called a couple of times and still not heard anything. She is feeling very frustrated and wants someone to please call her. Thanks!

## 2021-07-23 NOTE — TELEPHONE ENCOUNTER
Called patient back and explained that I was unable to give them results of the test without the doctor's permission. I apologized that she hadn't heard anything about the results at this time. I advised her that Dr. Abby Madera is out of the office for the afternoon, but he will be back on Monday. There was also a man on the phone with the patient who stated they had called several times and hadn't heard anything back. He also stated they called on Monday. I advised him that I don't see any record of those phone calls, but that I was addressing this phone call from today. I reminded the patient that Dr. Abby Madera wanted to see her again 1 month from her last appointment 6/28/2021. The man cut in and stated \"that's ridiculous to make her wait another month for results! \" I told him that again, I am unable to give the results without the doctor's permission and that I will send a message to Dr. Abby Madera about the results and that he would be back in the office on Monday. I advised them that the follow up appointment is to re-evaluate her. I transferred the phone to the  to schedule an appointment. Patient is scheduled for a follow up appointment on 8/13/2021.

## 2021-07-26 ENCOUNTER — TELEPHONE (OUTPATIENT)
Dept: SURGERY | Age: 66
End: 2021-07-26

## 2021-07-26 NOTE — TELEPHONE ENCOUNTER
Pt has called again she had a duplex lower ext on 7.09.2021 and she want her  results. Give patient a call back at [phone].

## 2021-07-27 NOTE — TELEPHONE ENCOUNTER
Called patient and let her know that Dr. Jesus Pineda called her yesterday morning and left her a message. Advised her that he did not put in details in his message in the chart, but she needed to listen to her voicemail to get the results from her duplex. Patient verbalized understanding.

## 2021-09-03 ENCOUNTER — OFFICE VISIT (OUTPATIENT)
Dept: SURGERY | Age: 66
End: 2021-09-03
Payer: MEDICARE

## 2021-09-03 VITALS
WEIGHT: 198.6 LBS | HEIGHT: 67 IN | DIASTOLIC BLOOD PRESSURE: 68 MMHG | SYSTOLIC BLOOD PRESSURE: 132 MMHG | OXYGEN SATURATION: 91 % | HEART RATE: 87 BPM | BODY MASS INDEX: 31.17 KG/M2 | RESPIRATION RATE: 16 BRPM | TEMPERATURE: 97.8 F

## 2021-09-03 DIAGNOSIS — R29.898 BILATERAL LEG WEAKNESS: Primary | ICD-10-CM

## 2021-09-03 DIAGNOSIS — I87.303 CHRONIC VENOUS HYPERTENSION INVOLVING BOTH SIDES: ICD-10-CM

## 2021-09-03 PROCEDURE — G8432 DEP SCR NOT DOC, RNG: HCPCS | Performed by: SURGERY

## 2021-09-03 PROCEDURE — G8417 CALC BMI ABV UP PARAM F/U: HCPCS | Performed by: SURGERY

## 2021-09-03 PROCEDURE — G8754 DIAS BP LESS 90: HCPCS | Performed by: SURGERY

## 2021-09-03 PROCEDURE — G8752 SYS BP LESS 140: HCPCS | Performed by: SURGERY

## 2021-09-03 PROCEDURE — 3017F COLORECTAL CA SCREEN DOC REV: CPT | Performed by: SURGERY

## 2021-09-03 PROCEDURE — G8427 DOCREV CUR MEDS BY ELIG CLIN: HCPCS | Performed by: SURGERY

## 2021-09-03 PROCEDURE — 99213 OFFICE O/P EST LOW 20 MIN: CPT | Performed by: SURGERY

## 2021-09-03 RX ORDER — MELOXICAM 15 MG/1
TABLET ORAL
Status: ON HOLD | COMMUNITY
Start: 2021-08-23 | End: 2021-10-14

## 2021-09-03 NOTE — PROGRESS NOTES
Chief Complaint   Patient presents with    Follow-up     1 month - check bilateral legs     Visit Vitals  /68 (BP 1 Location: Left arm, BP Patient Position: Sitting, BP Cuff Size: Adult)   Pulse 87   Temp 97.8 °F (36.6 °C) (Temporal)   Resp 16   Ht 5' 7\" (1.702 m)   Wt 198 lb 9.6 oz (90.1 kg)   SpO2 91%   BMI 31.11 kg/m²     1. Have you been to the ER, urgent care clinic since your last visit? Hospitalized since your last visit? No    2. Have you seen or consulted any other health care providers outside of the [address] [place] [address] since your last visit? Include any pap smears or colon screening.  No

## 2021-09-09 NOTE — PROGRESS NOTES
VASCULAR FOLLOW UP      Subjective:   CHIEF COMPLAINTS:  Both legs being weak  PRESENTATION OF ILLNESS:    Ms. Krishan Duffy is a here today follow-up. I have been compliant patient chronic venous hypertension and leg swelling issues. Patient currently on compression stocking with a pressure system of 20 to 30 mmHg. Patient has been wearing and compliant with compression stocking. Patient now complains of bilateral leg weak. Patient has a referral to a neurologist which was set left from a primary care physician. Patient denies chest pain shortness of breath worsening swelling. Past Medical History:   Diagnosis Date    Hypertension     Psychotic disorder ([address]     Schizophrenia      Past Surgical History:   Procedure Laterality Date    HX COLONOSCOPY      HX OTHER SURGICAL      Ingrown toenail removal     Family History   Problem Relation Age of Onset    No Known Problems Mother     No Known Problems Father       Social History     Tobacco Use    Smoking status: Current Every Day Smoker     Packs/day: 0.50     Years: 45.00     Pack years: 22.50    Smokeless tobacco: Never Used   Substance Use Topics    Alcohol use: Not Currently       Prior to Admission medications    Medication Sig Start Date End Date Taking? Authorizing Provider   meloxicam (MOBIC) 15 mg tablet  8/23/21  Yes Provider, Historical   ibuprofen (MOTRIN) 600 mg tablet Take 1 Tab by mouth every six (6) hours as needed for Pain. 4/14/21  Yes Heather Sims MD   amLODIPine (NORVASC) 5 mg tablet Take 5 mg by mouth daily. Yes Provider, Historical   imipramine (TOFRANIL) 25 mg tablet Take 25 mg by mouth nightly. Yes Provider, Historical   lithium carbonate 300 mg tablet Take 1 Tab by mouth two (2) times a day. One capsule in the morning and 2 capsules at bedtime 6/6/17  Yes Provider, Historical   OLANZapine (ZYPREXA) 20 mg tablet Take 10 mg by mouth nightly.  5/14/17  Yes Provider, Historical     No Known Allergies     Review of Systems:  I reviewed the rest of organ systems personally and they were negative signed by Dr. Miles Lewis    Objective:     Visit Vitals  /68 (BP 1 Location: Left arm, BP Patient Position: Sitting, BP Cuff Size: Adult)   Pulse 87   Temp 97.8 °F (36.6 °C) (Temporal)   Resp 16   Ht 5' 7\" (1.702 m)   Wt 198 lb 9.6 oz (90.1 kg)   SpO2 91%   BMI 31.11 kg/m²     VITAL SIGNS REVIEWED. Physical Exam:  Patient is well-nourished pleasant in conversation is appropriate. Head and neck examination atraumatic, normocephalic. Gaze appropriate. Conversation appropriate. Neck examination shows supple. No mass. No obvious carotid bruit. Chest examination shows lungs are clear bilaterally well-expanded, no crackles or wheezes. Cardiovascular system regular rate, no obvious murmur. Skin warm to touch  and moist, no skin lesions. Abdomen is soft ,not tender or distended bowel sounds present. No palpable mass. Neurological examinations, no focal neuro deficits moving all 4 extremities. Cranial nerves intact. Sensation is intact as well. Hematologic: No obvious bruise or swelling or obvious lymphadenopathy. Psychosocial: Appropriate. Has good effect. Musculoskeletal system: No muscle wasting, appropriate movements upper and lower extremity. Vascular examination: Bilateral lower extremity venous examination shows spider nevi and varicose veins noted with mild swelling. Patient CEAP C3 classification for venous disorder. Aby Hummingbird' sign is negative patient has palpable pedal pulses. Data Review:   No visits with results within 1 Month(s) from this visit.    Latest known visit with results is:   Hospital Outpatient Visit on 05/17/2021   Component Date Value Ref Range Status    Sodium 05/17/2021 143  136 - 145 mmol/L Final    Potassium 05/17/2021 3.8  3.5 - 5.1 mmol/L Final    Chloride 05/17/2021 106  97 - 108 mmol/L Final    CO2 05/17/2021 30  21 - 32 mmol/L Final    Anion gap 05/17/2021 7  5 - 15 mmol/L Final  Glucose 05/17/2021 103* 65 - 100 mg/dL Final    BUN 05/17/2021 11  6 - 20 mg/dL Final    Creatinine 05/17/2021 1.42* 0.55 - 1.02 mg/dL Final    BUN/Creatinine ratio 05/17/2021 8* 12 - 20   Final    GFR est AA 05/17/2021 45* >60 ml/min/1.73m2 Final    GFR est non-AA 05/17/2021 37* >60 ml/min/1.73m2 Final    Calcium 05/17/2021 10.1  8.5 - 10.1 mg/dL Final    Phosphorus 05/17/2021 3.3  2.6 - 4.7 mg/dL Final    Albumin 05/17/2021 3.8  3.5 - 5.0 g/dL Final    Calcium 05/17/2021 10.1  8.5 - 10.1 mg/dL Final    PTH, Intact 05/17/2021 133.5* 18.4 - 88.0 pg/mL Final    Vitamin D 25-Hydroxy 05/17/2021 40.7  30 - 100 ng/mL Final    Protein, urine random 05/17/2021 REPEATED  0.0 - 11.9 mg/dL Final    Creatinine, urine 05/17/2021 16.84* No reference range has been established. mg/dL Final    Protein/Creat. urine Ratio 05/17/2021 Not calculated    Final    Microalbumin,urine random 05/17/2021 0.51  mg/dL Final    Creatinine, urine 05/17/2021 15.70  mg/dL Final    Microalbumin/Creat ratio (mg/g cre* 05/17/2021 32* 0 - 30 mgMALB/gCRE Final        Assessment:     Problem List Items Addressed This Visit     None      Visit Diagnoses     Bilateral leg weakness    -  Primary    Relevant Orders    REFERRAL TO NEUROLOGY    Chronic venous hypertension involving both sides                  Plan:     I think is gradually seeing neurologist but both legs being weak. Patient's vascular system of arterial system is excellent. I will decrease this related to vasculature. Also swelling is improved. Patient was encouraged to continue her compression stocking and patient will be reassessed complaints of arterial and venous assessment in 6 months follow-up.         MD Neida

## 2021-09-10 ENCOUNTER — TELEPHONE (OUTPATIENT)
Dept: SURGERY | Age: 66
End: 2021-09-10

## 2021-09-10 NOTE — TELEPHONE ENCOUNTER
Patient called stated she would like to have a prescription for a walker due to legs, ankles and feet hurting. Stated is having a hard time walking. Stated she discussed this with dr bourgeois at her last appointment on 09/03/2021.  Please call patient [phone]

## 2021-09-14 DIAGNOSIS — R26.2 AMBULATORY DYSFUNCTION: Primary | ICD-10-CM

## 2021-09-15 ENCOUNTER — TELEPHONE (OUTPATIENT)
Dept: SURGERY | Age: 66
End: 2021-09-15

## 2021-09-15 NOTE — TELEPHONE ENCOUNTER
Brother called today regarding order for walker. Please redirect that to [facility] in [place]--fax #[phone]. It was sent to some where in [place] but please make correction. Thanks!

## 2021-09-15 NOTE — TELEPHONE ENCOUNTER
Called patient to let her know the prescription had been sent. Her brother, Yancy Resendiz, answered the phone and stated he was away from the house and that was the only phone they had so I let him know the rx had been sent.

## 2021-09-17 DIAGNOSIS — I87.303 CHRONIC VENOUS HYPERTENSION INVOLVING BOTH SIDES: ICD-10-CM

## 2021-09-17 DIAGNOSIS — R29.898 BILATERAL LEG WEAKNESS: Primary | ICD-10-CM

## 2021-09-17 NOTE — TELEPHONE ENCOUNTER
Pt came in with brother today and signed HIPPA forms to allow him to call to get and receive information on her behalf. They also picked up the DME order Dr. Ryan Noel had for a walker with wheels. The rx was also faxed to [facility] per pt request. [facility] called shortly after getting the fax and stated the order needed to read differently>it has to say  rolling walker with  seat attachment. This was done immediately and faxed back to [facility]. Pt's brother returned to office and stated he needed a different order for walker with wheels. [facility] politely informed the pt's brother that Mónica Rusandy Leija called the office and this had already been taken care of. Then the pt's brother promptly left the office.

## 2021-09-17 NOTE — TELEPHONE ENCOUNTER
Patient brother called back today concern his sister order for a walker send to [facility]. I told her brother I was unable to give him any information. I ask him can the patient give us a call back.

## 2021-09-17 NOTE — TELEPHONE ENCOUNTER
Patient called and I explained to her that we cannot give information out to her brother due to her not having him in her chart to speak with him regarding anything concerning her, he was very rude and tried to continue to take the phone from ms chapman. I explained to ms chapman that we will pass on her message that she would like an order sent to [facility] for a wheelchair due to [address] [place] [address] in [place] not covering it. Patient understood.  Please call if any questions [phone]

## 2021-09-20 ENCOUNTER — TELEPHONE (OUTPATIENT)
Dept: SURGERY | Age: 66
End: 2021-09-20

## 2021-09-20 NOTE — TELEPHONE ENCOUNTER
Continuation per prior telephone message on 09/17/2021 patient and her brother came into the office after phone conversation and  brother was upset due to waiting all week for a response for an order for a walker for the patient with no response from the office. I had spoken with the patient prior to them coming into the office and explained to her that her brother was not on her contact list to receive any information regarding her chart or medical information. I ended the call with ms chapman and she understood. Patients brother very upset when they came in the office and said he was laughed at and was hung up on (which did not happen). Brother also stated he wanted to report this incident.

## 2021-09-20 NOTE — TELEPHONE ENCOUNTER
I was witness to the telephone conversation and the conversation in the office. Patient's brother Dmitry Later was upset when they came into the office and told the staff that he was going to report us to [facility] as he had done so in the past. He was told that due to HIPAA we could not talk to him about his sister until she gave us permission to do so. We apologized for any miss understanding and I asked how I could help. Once the patient signed the [address] agreement giving us permission to talk to the brother we did so.  Nurse Owen handed the patient her orders for a walker and also faxed them to [facility].

## 2021-10-12 ENCOUNTER — HOSPITAL ENCOUNTER (EMERGENCY)
Age: 66
Discharge: HOME | DRG: 309 | End: 2021-10-12
Attending: STUDENT IN AN ORGANIZED HEALTH CARE EDUCATION/TRAINING PROGRAM
Payer: MEDICARE

## 2021-10-12 ENCOUNTER — APPOINTMENT (OUTPATIENT)
Dept: GENERAL RADIOLOGY | Age: 66
DRG: 309 | End: 2021-10-12
Attending: STUDENT IN AN ORGANIZED HEALTH CARE EDUCATION/TRAINING PROGRAM
Payer: MEDICARE

## 2021-10-12 ENCOUNTER — APPOINTMENT (OUTPATIENT)
Dept: CT IMAGING | Age: 66
DRG: 309 | End: 2021-10-12
Attending: STUDENT IN AN ORGANIZED HEALTH CARE EDUCATION/TRAINING PROGRAM
Payer: MEDICARE

## 2021-10-12 ENCOUNTER — HOSPITAL ENCOUNTER (INPATIENT)
Age: 66
LOS: 2 days | Discharge: HOME HEALTH | DRG: 309 | End: 2021-10-14
Attending: STUDENT IN AN ORGANIZED HEALTH CARE EDUCATION/TRAINING PROGRAM | Admitting: INTERNAL MEDICINE
Payer: MEDICARE

## 2021-10-12 VITALS
OXYGEN SATURATION: 100 % | DIASTOLIC BLOOD PRESSURE: 70 MMHG | TEMPERATURE: 98.1 F | SYSTOLIC BLOOD PRESSURE: 122 MMHG | RESPIRATION RATE: 15 BRPM | HEART RATE: 58 BPM

## 2021-10-12 DIAGNOSIS — E03.9 HYPOTHYROIDISM, UNSPECIFIED TYPE: ICD-10-CM

## 2021-10-12 DIAGNOSIS — R00.1 BRADYCARDIA: Primary | ICD-10-CM

## 2021-10-12 DIAGNOSIS — R53.83 FATIGUE, UNSPECIFIED TYPE: ICD-10-CM

## 2021-10-12 DIAGNOSIS — R79.89 ELEVATED TSH: ICD-10-CM

## 2021-10-12 PROBLEM — N18.9 ACUTE KIDNEY INJURY SUPERIMPOSED ON CKD (HCC): Status: ACTIVE | Noted: 2021-10-12

## 2021-10-12 PROBLEM — R53.1 GENERALIZED WEAKNESS: Status: ACTIVE | Noted: 2021-10-12

## 2021-10-12 PROBLEM — N17.9 ACUTE KIDNEY INJURY SUPERIMPOSED ON CKD (HCC): Status: ACTIVE | Noted: 2021-10-12

## 2021-10-12 LAB
ALBUMIN SERPL-MCNC: 3.3 G/DL (ref 3.5–5)
ALBUMIN SERPL-MCNC: 4.2 G/DL (ref 3.5–5)
ALBUMIN/GLOB SERPL: 1.2 {RATIO} (ref 1.1–2.2)
ALBUMIN/GLOB SERPL: 1.3 {RATIO} (ref 1.1–2.2)
ALP SERPL-CCNC: 105 U/L (ref 45–117)
ALP SERPL-CCNC: 83 U/L (ref 45–117)
ALT SERPL-CCNC: 48 U/L (ref 12–78)
ALT SERPL-CCNC: 65 U/L (ref 12–78)
AMMONIA PLAS-SCNC: 14 UMOL/L
ANION GAP SERPL CALC-SCNC: 2 MMOL/L (ref 5–15)
ANION GAP SERPL CALC-SCNC: 5 MMOL/L (ref 5–15)
APPEARANCE UR: CLEAR
AST SERPL W P-5'-P-CCNC: 62 U/L (ref 15–37)
AST SERPL W P-5'-P-CCNC: 69 U/L (ref 15–37)
ATRIAL RATE: 74 BPM
BACTERIA URNS QL MICRO: NEGATIVE /HPF
BASOPHILS # BLD: 0 K/UL (ref 0–0.1)
BASOPHILS # BLD: 0 K/UL (ref 0–0.1)
BASOPHILS NFR BLD: 0 % (ref 0–1)
BASOPHILS NFR BLD: 0 % (ref 0–1)
BILIRUB SERPL-MCNC: 0.7 MG/DL (ref 0.2–1)
BILIRUB SERPL-MCNC: 0.8 MG/DL (ref 0.2–1)
BILIRUB UR QL: NEGATIVE
BNP SERPL-MCNC: 56 PG/ML
BUN SERPL-MCNC: 14 MG/DL (ref 6–20)
BUN SERPL-MCNC: 14 MG/DL (ref 6–20)
BUN/CREAT SERPL: 10 (ref 12–20)
BUN/CREAT SERPL: 8 (ref 12–20)
CA-I BLD-MCNC: 12 MG/DL (ref 8.5–10.1)
CA-I BLD-MCNC: 9.8 MG/DL (ref 8.5–10.1)
CALCULATED P AXIS, ECG09: 76 DEGREES
CALCULATED R AXIS, ECG10: 137 DEGREES
CALCULATED T AXIS, ECG11: 49 DEGREES
CHLORIDE SERPL-SCNC: 109 MMOL/L (ref 97–108)
CHLORIDE SERPL-SCNC: 113 MMOL/L (ref 97–108)
CO2 SERPL-SCNC: 26 MMOL/L (ref 21–32)
CO2 SERPL-SCNC: 32 MMOL/L (ref 21–32)
COLOR UR: NORMAL
CREAT SERPL-MCNC: 1.34 MG/DL (ref 0.55–1.02)
CREAT SERPL-MCNC: 1.79 MG/DL (ref 0.55–1.02)
DATE LAST DOSE: ABNORMAL
DIAGNOSIS, 93000: NORMAL
DIFFERENTIAL METHOD BLD: ABNORMAL
DIFFERENTIAL METHOD BLD: ABNORMAL
EOSINOPHIL # BLD: 0.2 K/UL (ref 0–0.4)
EOSINOPHIL # BLD: 0.3 K/UL (ref 0–0.4)
EOSINOPHIL NFR BLD: 2 % (ref 0–7)
EOSINOPHIL NFR BLD: 3 % (ref 0–7)
ERYTHROCYTE [DISTWIDTH] IN BLOOD BY AUTOMATED COUNT: 12.4 % (ref 11.5–14.5)
ERYTHROCYTE [DISTWIDTH] IN BLOOD BY AUTOMATED COUNT: 12.5 % (ref 11.5–14.5)
GLOBULIN SER CALC-MCNC: 2.7 G/DL (ref 2–4)
GLOBULIN SER CALC-MCNC: 3.3 G/DL (ref 2–4)
GLUCOSE SERPL-MCNC: 80 MG/DL (ref 65–100)
GLUCOSE SERPL-MCNC: 81 MG/DL (ref 65–100)
GLUCOSE UR STRIP.AUTO-MCNC: NEGATIVE MG/DL
HCT VFR BLD AUTO: 37.8 % (ref 35–47)
HCT VFR BLD AUTO: 42.2 % (ref 35–47)
HGB BLD-MCNC: 12.1 G/DL (ref 11.5–16)
HGB BLD-MCNC: 12.9 G/DL (ref 11.5–16)
HGB UR QL STRIP: NEGATIVE
IMM GRANULOCYTES # BLD AUTO: 0 K/UL (ref 0–0.04)
IMM GRANULOCYTES # BLD AUTO: 0 K/UL (ref 0–0.04)
IMM GRANULOCYTES NFR BLD AUTO: 0 % (ref 0–0.5)
IMM GRANULOCYTES NFR BLD AUTO: 0 % (ref 0–0.5)
KETONES UR QL STRIP.AUTO: NEGATIVE MG/DL
LEUKOCYTE ESTERASE UR QL STRIP.AUTO: NEGATIVE
LITHIUM SERPL-SCNC: 2.09 MMOL/L (ref 0.6–1.2)
LYMPHOCYTES # BLD: 0.9 K/UL (ref 0.8–3.5)
LYMPHOCYTES # BLD: 1 K/UL (ref 0.8–3.5)
LYMPHOCYTES NFR BLD: 11 % (ref 12–49)
LYMPHOCYTES NFR BLD: 12 % (ref 12–49)
MCH RBC QN AUTO: 31.3 PG (ref 26–34)
MCH RBC QN AUTO: 32.1 PG (ref 26–34)
MCHC RBC AUTO-ENTMCNC: 30.6 G/DL (ref 30–36.5)
MCHC RBC AUTO-ENTMCNC: 32 G/DL (ref 30–36.5)
MCV RBC AUTO: 100.3 FL (ref 80–99)
MCV RBC AUTO: 102.4 FL (ref 80–99)
MONOCYTES # BLD: 0.6 K/UL (ref 0–1)
MONOCYTES # BLD: 0.6 K/UL (ref 0–1)
MONOCYTES NFR BLD: 7 % (ref 5–13)
MONOCYTES NFR BLD: 7 % (ref 5–13)
NEUTS SEG # BLD: 6.7 K/UL (ref 1.8–8)
NEUTS SEG # BLD: 6.9 K/UL (ref 1.8–8)
NEUTS SEG NFR BLD: 79 % (ref 32–75)
NEUTS SEG NFR BLD: 79 % (ref 32–75)
NITRITE UR QL STRIP.AUTO: NEGATIVE
NRBC # BLD: 0 K/UL (ref 0–0.01)
NRBC # BLD: 0 K/UL (ref 0–0.01)
NRBC BLD-RTO: 0 PER 100 WBC
NRBC BLD-RTO: 0 PER 100 WBC
P-R INTERVAL, ECG05: 214 MS
PH UR STRIP: 7 [PH] (ref 5–8)
PLATELET # BLD AUTO: 235 K/UL (ref 150–400)
PLATELET # BLD AUTO: 293 K/UL (ref 150–400)
PMV BLD AUTO: 11 FL (ref 8.9–12.9)
PMV BLD AUTO: 12.2 FL (ref 8.9–12.9)
POTASSIUM SERPL-SCNC: 3.7 MMOL/L (ref 3.5–5.1)
POTASSIUM SERPL-SCNC: 4.6 MMOL/L (ref 3.5–5.1)
PROT SERPL-MCNC: 6 G/DL (ref 6.4–8.2)
PROT SERPL-MCNC: 7.5 G/DL (ref 6.4–8.2)
PROT UR STRIP-MCNC: NEGATIVE MG/DL
Q-T INTERVAL, ECG07: 372 MS
QRS DURATION, ECG06: 94 MS
QTC CALCULATION (BEZET), ECG08: 412 MS
RBC # BLD AUTO: 3.77 M/UL (ref 3.8–5.2)
RBC # BLD AUTO: 4.12 M/UL (ref 3.8–5.2)
RBC #/AREA URNS HPF: NORMAL /HPF (ref 0–5)
REPORTED DOSE,DOSE: ABNORMAL UNITS
SODIUM SERPL-SCNC: 143 MMOL/L (ref 136–145)
SODIUM SERPL-SCNC: 144 MMOL/L (ref 136–145)
SP GR UR REFRACTOMETRY: <1.005 (ref 1–1.03)
TROPONIN-HIGH SENSITIVITY: 21 NG/L (ref 0–51)
TROPONIN-HIGH SENSITIVITY: 22 NG/L (ref 0–51)
TROPONIN-HIGH SENSITIVITY: 26 NG/L (ref 0–51)
TSH SERPL DL<=0.05 MIU/L-ACNC: 4.96 UIU/ML (ref 0.36–3.74)
UA: UC IF INDICATED,UAUC: NORMAL
UROBILINOGEN UR QL STRIP.AUTO: 0.1 EU/DL (ref 0.1–1)
VENTRICULAR RATE, ECG03: 74 BPM
WBC # BLD AUTO: 8.5 K/UL (ref 3.6–11)
WBC # BLD AUTO: 8.7 K/UL (ref 3.6–11)
WBC URNS QL MICRO: NORMAL /HPF (ref 0–4)

## 2021-10-12 PROCEDURE — 99285 EMERGENCY DEPT VISIT HI MDM: CPT

## 2021-10-12 PROCEDURE — 36415 COLL VENOUS BLD VENIPUNCTURE: CPT

## 2021-10-12 PROCEDURE — 99284 EMERGENCY DEPT VISIT MOD MDM: CPT

## 2021-10-12 PROCEDURE — 71045 X-RAY EXAM CHEST 1 VIEW: CPT

## 2021-10-12 PROCEDURE — 70450 CT HEAD/BRAIN W/O DYE: CPT

## 2021-10-12 PROCEDURE — 83880 ASSAY OF NATRIURETIC PEPTIDE: CPT

## 2021-10-12 PROCEDURE — 84484 ASSAY OF TROPONIN QUANT: CPT

## 2021-10-12 PROCEDURE — 84443 ASSAY THYROID STIM HORMONE: CPT

## 2021-10-12 PROCEDURE — 82140 ASSAY OF AMMONIA: CPT

## 2021-10-12 PROCEDURE — 80178 ASSAY OF LITHIUM: CPT

## 2021-10-12 PROCEDURE — 80053 COMPREHEN METABOLIC PANEL: CPT

## 2021-10-12 PROCEDURE — 93005 ELECTROCARDIOGRAM TRACING: CPT

## 2021-10-12 PROCEDURE — 65270000029 HC RM PRIVATE

## 2021-10-12 PROCEDURE — 81001 URINALYSIS AUTO W/SCOPE: CPT

## 2021-10-12 PROCEDURE — 84439 ASSAY OF FREE THYROXINE: CPT

## 2021-10-12 PROCEDURE — 84481 FREE ASSAY (FT-3): CPT

## 2021-10-12 PROCEDURE — 85025 COMPLETE CBC W/AUTO DIFF WBC: CPT

## 2021-10-12 RX ORDER — ONDANSETRON 4 MG/1
4 TABLET, ORALLY DISINTEGRATING ORAL
Status: DISCONTINUED | OUTPATIENT
Start: 2021-10-12 | End: 2021-10-14 | Stop reason: HOSPADM

## 2021-10-12 RX ORDER — SODIUM CHLORIDE 0.9 % (FLUSH) 0.9 %
5-40 SYRINGE (ML) INJECTION EVERY 8 HOURS
Status: DISCONTINUED | OUTPATIENT
Start: 2021-10-12 | End: 2021-10-14 | Stop reason: HOSPADM

## 2021-10-12 RX ORDER — SODIUM CHLORIDE 9 MG/ML
100 INJECTION, SOLUTION INTRAVENOUS CONTINUOUS
Status: DISCONTINUED | OUTPATIENT
Start: 2021-10-12 | End: 2021-10-14 | Stop reason: HOSPADM

## 2021-10-12 RX ORDER — POLYETHYLENE GLYCOL 3350 17 G/17G
17 POWDER, FOR SOLUTION ORAL DAILY PRN
Status: DISCONTINUED | OUTPATIENT
Start: 2021-10-12 | End: 2021-10-14 | Stop reason: HOSPADM

## 2021-10-12 RX ORDER — OLANZAPINE 5 MG/1
10 TABLET ORAL
Status: DISCONTINUED | OUTPATIENT
Start: 2021-10-12 | End: 2021-10-14 | Stop reason: HOSPADM

## 2021-10-12 RX ORDER — ACETAMINOPHEN 325 MG/1
650 TABLET ORAL
Status: DISCONTINUED | OUTPATIENT
Start: 2021-10-12 | End: 2021-10-14 | Stop reason: HOSPADM

## 2021-10-12 RX ORDER — AMLODIPINE BESYLATE 5 MG/1
5 TABLET ORAL DAILY
Status: DISCONTINUED | OUTPATIENT
Start: 2021-10-13 | End: 2021-10-14 | Stop reason: HOSPADM

## 2021-10-12 RX ORDER — ENOXAPARIN SODIUM 100 MG/ML
40 INJECTION SUBCUTANEOUS DAILY
Status: DISCONTINUED | OUTPATIENT
Start: 2021-10-13 | End: 2021-10-14 | Stop reason: HOSPADM

## 2021-10-12 RX ORDER — SODIUM CHLORIDE 0.9 % (FLUSH) 0.9 %
5-40 SYRINGE (ML) INJECTION AS NEEDED
Status: DISCONTINUED | OUTPATIENT
Start: 2021-10-12 | End: 2021-10-14 | Stop reason: HOSPADM

## 2021-10-12 RX ORDER — IMIPRAMINE HYDROCHLORIDE 25 MG/1
25 TABLET ORAL
Status: DISCONTINUED | OUTPATIENT
Start: 2021-10-13 | End: 2021-10-14 | Stop reason: HOSPADM

## 2021-10-12 RX ORDER — ONDANSETRON 2 MG/ML
4 INJECTION INTRAMUSCULAR; INTRAVENOUS
Status: DISCONTINUED | OUTPATIENT
Start: 2021-10-12 | End: 2021-10-14 | Stop reason: HOSPADM

## 2021-10-12 RX ORDER — LITHIUM CARBONATE 300 MG
300 TABLET ORAL 2 TIMES DAILY
Status: DISCONTINUED | OUTPATIENT
Start: 2021-10-12 | End: 2021-10-14 | Stop reason: HOSPADM

## 2021-10-12 RX ORDER — ACETAMINOPHEN 650 MG/1
650 SUPPOSITORY RECTAL
Status: DISCONTINUED | OUTPATIENT
Start: 2021-10-12 | End: 2021-10-14 | Stop reason: HOSPADM

## 2021-10-12 NOTE — ED NOTES
Pt presented to the ER with complaint of Weakness. Pt stated she has been having unsteady gait for ~ 1 week. Pt stated she will developed generalized weakness and sometimes increased weakness in both arms. Pt denies any chest pain, dizziness, N/V or sweating with episodes of increased weakness. Pt denies any flu/cold like symptoms. Pt stated she was brought in by her brother. He put for mental health. Pt denies any SI or HI. Pt stated she is not feeling depressed. Brother left and unable to get a hold of him for questioning. oriented to room and call bell,, cardiac monitoring initiated , spO2 Monitoring initiated , IV  initiated , call bell within reach, side rails up x2, resting comfortable but easily arousable, no signs of acute distress. , bed in lowest position, will continue to monitor      Airway: Patent, Managing oral secretions and No obstruction    Respiratory: Normal Rate , Normal Depth, No acute Distress and Speaking in full sentences    Cardiac: Patient denies any chest pain/discomfort    Neuro: AVPU Alert and A&O4/4. Pt has unsteady gait but equal to both sides. No arm or leg drift noted.      GI: Soft and Non-tender    : WNL    Muscle/Skeletal/Skin: Left Arm: Generalized weakness, Right Arm: Generalized Weakness, Left Leg: Generalized Weakness and Right Leg: Weakness

## 2021-10-12 NOTE — DISCHARGE INSTRUCTIONS
Thank you! Thank you for allowing me to care for you in the emergency department. I sincerely hope that you are satisfied with your visit today. It is my goal to provide you with excellent care. Below you will find a list of your labs and imaging from your visit today. Should you have any questions regarding these results please do not hesitate to call the emergency department. Labs -     Recent Results (from the past 12 hour(s))   EKG, 12 LEAD, INITIAL    Collection Time: 10/12/21 11:43 AM   Result Value Ref Range    Ventricular Rate 74 BPM    Atrial Rate 74 BPM    P-R Interval 214 ms    QRS Duration 94 ms    Q-T Interval 372 ms    QTC Calculation (Bezet) 412 ms    Calculated P Axis 76 degrees    Calculated R Axis 137 degrees    Calculated T Axis 49 degrees    Diagnosis       Sinus rhythm with 1st degree A-V block  Biatrial enlargement  Right axis deviation  Pulmonary disease pattern  Incomplete right bundle branch block  Nonspecific T wave abnormality  Abnormal ECG  When compared with ECG of 03-OCT-2016 11:40,  Incomplete right bundle branch block is now Present  T wave inversion now evident in Lateral leads  QT has shortened  Confirmed by Yogi Du ([id number]) on 10/12/2021 1:43:59 PM     CBC WITH AUTOMATED DIFF    Collection Time: 10/12/21 11:50 AM   Result Value Ref Range    WBC 8.7 3.6 - 11.0 K/uL    RBC 3.77 (L) 3.80 - 5.20 M/uL    HGB 12.1 11.5 - 16.0 g/dL    HCT 37.8 35.0 - 47.0 %    .3 (H) 80.0 - 99.0 FL    MCH 32.1 26.0 - 34.0 PG    MCHC 32.0 30.0 - 36.5 g/dL    RDW 12.5 11.5 - 14.5 %    PLATELET 437 065 - 101 K/uL    MPV 12.2 8.9 - 12.9 FL    NRBC 0.0 0.0  WBC    ABSOLUTE NRBC 0.00 0.00 - 0.01 K/uL    NEUTROPHILS 79 (H) 32 - 75 %    LYMPHOCYTES 12 12 - 49 %    MONOCYTES 7 5 - 13 %    EOSINOPHILS 2 0 - 7 %    BASOPHILS 0 0 - 1 %    IMMATURE GRANULOCYTES 0 0 - 0.5 %    ABS. NEUTROPHILS 6.9 1.8 - 8.0 K/UL    ABS. LYMPHOCYTES 1.0 0.8 - 3.5 K/UL    ABS.  MONOCYTES 0.6 0.0 - 1.0 K/UL    ABS. EOSINOPHILS 0.2 0.0 - 0.4 K/UL    ABS. BASOPHILS 0.0 0.0 - 0.1 K/UL    ABS. IMM. GRANS. 0.0 0.00 - 0.04 K/UL    DF AUTOMATED     TROPONIN-HIGH SENSITIVITY    Collection Time: 10/12/21 11:50 AM   Result Value Ref Range    Troponin-High Sensitivity 21 0 - 51 ng/L   TSH 3RD GENERATION    Collection Time: 10/12/21 11:50 AM   Result Value Ref Range    TSH 4.96 (H) 0.36 - 9.24 uIU/mL   METABOLIC PANEL, COMPREHENSIVE    Collection Time: 10/12/21  1:00 PM   Result Value Ref Range    Sodium 144 136 - 145 mmol/L    Potassium 3.7 3.5 - 5.1 mmol/L    Chloride 113 (H) 97 - 108 mmol/L    CO2 26 21 - 32 mmol/L    Anion gap 5 5 - 15 mmol/L    Glucose 80 65 - 100 mg/dL    BUN 14 6 - 20 mg/dL    Creatinine 1.34 (H) 0.55 - 1.02 mg/dL    BUN/Creatinine ratio 10 (L) 12 - 20      GFR est AA 48 (L) >60 ml/min/1.73m2    GFR est non-AA 40 (L) >60 ml/min/1.73m2    Calcium 9.8 8.5 - 10.1 mg/dL    Bilirubin, total 0.7 0.2 - 1.0 mg/dL    AST (SGOT) 62 (H) 15 - 37 U/L    ALT (SGPT) 48 12 - 78 U/L    Alk. phosphatase 83 45 - 117 U/L    Protein, total 6.0 (L) 6.4 - 8.2 g/dL    Albumin 3.3 (L) 3.5 - 5.0 g/dL    Globulin 2.7 2.0 - 4.0 g/dL    A-G Ratio 1.2 1.1 - 2.2     TROPONIN-HIGH SENSITIVITY    Collection Time: 10/12/21  2:35 PM   Result Value Ref Range    Troponin-High Sensitivity 26 0 - 51 ng/L   NT-PRO BNP    Collection Time: 10/12/21  2:35 PM   Result Value Ref Range    NT pro-BNP 56 <125 pg/mL       Radiologic Studies -   XR CHEST PORT   Final Result        CT Results  (Last 48 hours)      None          CXR Results  (Last 48 hours)                 10/12/21 1243  XR CHEST PORT Final result    Narrative:  1 view       Cardiomegaly without congestion. Clear lungs. No effusion or pneumothorax                  If you feel that you have not received excellent quality care or timely care, please ask to speak to the nurse manager. Please choose us in the future for your continued health care needs. ------------------------------------------------------------------------------------------------------------  The exam and treatment you received in the Emergency Department were for an urgent problem and are not intended as complete care. It is important that you follow-up with a doctor, nurse practitioner, or physician assistant to:  (1) confirm your diagnosis,  (2) re-evaluation of changes in your illness and treatment, and  (3) for ongoing care. If your symptoms become worse or you do not improve as expected and you are unable to reach your usual health care provider, you should return to the Emergency Department. We are available 24 hours a day. Please take your discharge instructions with you when you go to your follow-up appointment. If you have any problem arranging a follow-up appointment, contact the Emergency Department immediately. If a prescription has been provided, please have it filled as soon as possible to prevent a delay in treatment. Read the entire medication instruction sheet provided to you by the pharmacy. If you have any questions or reservations about taking the medication due to side effects or interactions with other medications, please call your primary care physician or contact the ER to speak with the charge nurse. Make an appointment with your family doctor or the physician you were referred to for follow-up of this visit as instructed on your discharge paperwork, as this is a mandatory follow-up. Return to the ER if you are unable to be seen or if you are unable to be seen in a timely manner. If you have any problem arranging the follow-up visit, contact the Emergency Department immediately.

## 2021-10-12 NOTE — ED NOTES
Care assumed and bedside SBAR report endorsed on [facility]. Pt cardiac monitoring continued , spO2 Monitoring continued, IV reassed, call bell within reach, side rails up x2, resting comfortable but easily arousable, no signs of acute distress. , bed in lowest position, MAR reviewed, Labs reviewed, will continue to monitor

## 2021-10-12 NOTE — ED PROVIDER NOTES
EMERGENCY DEPARTMENT HISTORY AND PHYSICAL EXAM      Date: 10/12/2021  Patient Name: Fatmata Uriostegui    History of Presenting Illness     Chief Complaint   Patient presents with    Extremity Weakness    Slow Heart Rate       HPI: Fatmata Uriostegui, 61 y.o. female with a past medical history of hypertension of schizophrenia on Zyprexa and amlodipine presented today for generalized weakness. She reports having unsteady gait for the past week. She was brought in by her brother who noted that she was presenting for a mental health issue however he left without any further history and I was unable to contact him. The patient reports that she has had multiple falls over the past week without any headache, head trauma, syncope, nausea, or vomiting. Currently denies any headache, chest pain, shortness breath, abdominal pain, nausea, or vomiting. Patient reports no history of bradycardia. She denies any orthopnea or PND. Denies any lower extremity swelling. PCP: Reji Thomason MD    Current Outpatient Medications   Medication Sig Dispense Refill    meloxicam (MOBIC) 15 mg tablet       ibuprofen (MOTRIN) 600 mg tablet Take 1 Tab by mouth every six (6) hours as needed for Pain. 20 Tab 0    amLODIPine (NORVASC) 5 mg tablet Take 5 mg by mouth daily.  imipramine (TOFRANIL) 25 mg tablet Take 25 mg by mouth nightly.  lithium carbonate 300 mg tablet Take 1 Tab by mouth two (2) times a day. One capsule in the morning and 2 capsules at bedtime      OLANZapine (ZYPREXA) 20 mg tablet Take 10 mg by mouth nightly.          Medical History   I reviewed the medical, surgical, family, and social history, as well as allergies:    Past Medical History:  Past Medical History:   Diagnosis Date    Hypertension     Psychotic disorder ([facility] [address]     Schizophrenia       Past Surgical History:  Past Surgical History:   Procedure Laterality Date    HX COLONOSCOPY      HX OTHER SURGICAL      Ingrown toenail removal       Family History:  Family History   Problem Relation Age of Onset    No Known Problems Mother     No Known Problems Father        Social History:  Social History     Tobacco Use    Smoking status: Current Every Day Smoker     Packs/day: 0.50     Years: 45.00     Pack years: 22.50    Smokeless tobacco: Never Used   Vaping Use    Vaping Use: Never used   Substance Use Topics    Alcohol use: Not Currently    Drug use: Never       Allergies:  No Known Allergies    Review of Systems     Review of Systems   Constitutional: Negative for chills and fever. HENT: Negative for congestion, rhinorrhea and sore throat. Eyes: Negative. Respiratory: Negative for cough and shortness of breath. Cardiovascular: Negative for chest pain and leg swelling. Gastrointestinal: Negative for abdominal pain and vomiting. Endocrine: Negative. Genitourinary: Negative for dysuria and hematuria. Musculoskeletal: Negative for back pain and myalgias. Skin: Negative for rash and wound. Allergic/Immunologic: Negative. Neurological: Positive for weakness. Negative for light-headedness and numbness. Recurrent falls   Hematological: Negative. Psychiatric/Behavioral: Negative for agitation and confusion. Physical Exam and Vital Signs   Vital Signs - Reviewed the patient's vital signs.     Patient Vitals for the past 12 hrs:   Pulse Resp BP SpO2   10/12/21 1419 (!) 43 16 120/62 100 %   10/12/21 1300 (!) 44 16 121/71 100 %   10/12/21 1200 (!) 48 16 101/64 100 %   10/12/21 1115 (!) 45 16 110/61 100 %       Physical Exam:    GENERAL: awake, alert, cooperative, not in distress  HEENT:  * Pupils equal, EOMI  * Head atraumatic  CV:  * regular rhythm  * warm and perfused extremities bilaterally  *Bradycardia  PULMONARY: Good air movement, no wheezes or crackles  ABDOMEN: soft, not distended, no guarding, not tenderness to palpation  : No suprapubic tenderness  EXTREMITIES/BACK: warm and perfused, no tenderness, no edema  SKIN: no rashes or signs of trauma  NEURO:  * Speech clear  * Moves U&LE to command      Medical Decision Making and ED Course   - I am the first and primary provider for this patient and am the primary provider of record. - I reviewed the vital signs, available nursing notes, past medical history, past surgical history, family history and social history. - Initial assessment performed. The patients presenting problems have been discussed, and the staff are in agreement with the care plan formulated and outlined with them. I have encouraged them to ask questions as they arise throughout their visit. - Available medical records, nursing notes, old EKGs, and EMS run sheets (if patient was EMS transported) were reviewed    MDM:   Patient is a 61 y.o. female presenting for generalized weakness. Vitals reveal the cardia and physical exam reveals slow responses but appropriate. EKG showed sinus izzy. Based on the history, physical exam, risk factors, and vitals signs, differential includes: Hypothyroidism, ACS, CHF, electrolyte disturbances, RAO, dehydration.       Results     Labs:  Recent Results (from the past 12 hour(s))   EKG, 12 LEAD, INITIAL    Collection Time: 10/12/21 11:43 AM   Result Value Ref Range    Ventricular Rate 74 BPM    Atrial Rate 74 BPM    P-R Interval 214 ms    QRS Duration 94 ms    Q-T Interval 372 ms    QTC Calculation (Bezet) 412 ms    Calculated P Axis 76 degrees    Calculated R Axis 137 degrees    Calculated T Axis 49 degrees    Diagnosis       Sinus rhythm with 1st degree A-V block  Biatrial enlargement  Right axis deviation  Pulmonary disease pattern  Incomplete right bundle branch block  Nonspecific T wave abnormality  Abnormal ECG  When compared with ECG of 03-OCT-2016 11:40,  Incomplete right bundle branch block is now Present  T wave inversion now evident in Lateral leads  QT has shortened  Confirmed by Henry Meredith ([id number]) on 10/12/2021 1:43:59 PM     CBC WITH AUTOMATED DIFF    Collection Time: 10/12/21 11:50 AM   Result Value Ref Range    WBC 8.7 3.6 - 11.0 K/uL    RBC 3.77 (L) 3.80 - 5.20 M/uL    HGB 12.1 11.5 - 16.0 g/dL    HCT 37.8 35.0 - 47.0 %    .3 (H) 80.0 - 99.0 FL    MCH 32.1 26.0 - 34.0 PG    MCHC 32.0 30.0 - 36.5 g/dL    RDW 12.5 11.5 - 14.5 %    PLATELET 753 306 - 528 K/uL    MPV 12.2 8.9 - 12.9 FL    NRBC 0.0 0.0  WBC    ABSOLUTE NRBC 0.00 0.00 - 0.01 K/uL    NEUTROPHILS 79 (H) 32 - 75 %    LYMPHOCYTES 12 12 - 49 %    MONOCYTES 7 5 - 13 %    EOSINOPHILS 2 0 - 7 %    BASOPHILS 0 0 - 1 %    IMMATURE GRANULOCYTES 0 0 - 0.5 %    ABS. NEUTROPHILS 6.9 1.8 - 8.0 K/UL    ABS. LYMPHOCYTES 1.0 0.8 - 3.5 K/UL    ABS. MONOCYTES 0.6 0.0 - 1.0 K/UL    ABS. EOSINOPHILS 0.2 0.0 - 0.4 K/UL    ABS. BASOPHILS 0.0 0.0 - 0.1 K/UL    ABS. IMM. GRANS. 0.0 0.00 - 0.04 K/UL    DF AUTOMATED     TROPONIN-HIGH SENSITIVITY    Collection Time: 10/12/21 11:50 AM   Result Value Ref Range    Troponin-High Sensitivity 21 0 - 51 ng/L   TSH 3RD GENERATION    Collection Time: 10/12/21 11:50 AM   Result Value Ref Range    TSH 4.96 (H) 0.36 - 2.69 uIU/mL   METABOLIC PANEL, COMPREHENSIVE    Collection Time: 10/12/21  1:00 PM   Result Value Ref Range    Sodium 144 136 - 145 mmol/L    Potassium 3.7 3.5 - 5.1 mmol/L    Chloride 113 (H) 97 - 108 mmol/L    CO2 26 21 - 32 mmol/L    Anion gap 5 5 - 15 mmol/L    Glucose 80 65 - 100 mg/dL    BUN 14 6 - 20 mg/dL    Creatinine 1.34 (H) 0.55 - 1.02 mg/dL    BUN/Creatinine ratio 10 (L) 12 - 20      GFR est AA 48 (L) >60 ml/min/1.73m2    GFR est non-AA 40 (L) >60 ml/min/1.73m2    Calcium 9.8 8.5 - 10.1 mg/dL    Bilirubin, total 0.7 0.2 - 1.0 mg/dL    AST (SGOT) 62 (H) 15 - 37 U/L    ALT (SGPT) 48 12 - 78 U/L    Alk.  phosphatase 83 45 - 117 U/L    Protein, total 6.0 (L) 6.4 - 8.2 g/dL    Albumin 3.3 (L) 3.5 - 5.0 g/dL    Globulin 2.7 2.0 - 4.0 g/dL    A-G Ratio 1.2 1.1 - 2.2     TROPONIN-HIGH SENSITIVITY    Collection Time: 10/12/21  2:35 PM   Result Value Ref Range    Troponin-High Sensitivity 26 0 - 51 ng/L   NT-PRO BNP    Collection Time: 10/12/21  2:35 PM   Result Value Ref Range    NT pro-BNP 56 <125 pg/mL       Radiologic Studies:  CT Results  (Last 48 hours)    None        CXR Results  (Last 48 hours)               10/12/21 1243  XR CHEST PORT Final result    Narrative:  1 view       Cardiomegaly without congestion. Clear lungs. No effusion or pneumothorax             Medications ordered:  Medications - No data to display     ED Course     ED Course:     ED Course as of Oct 12 1535   Tue Oct 12, 2021   1332 Trop is 21, will repeat.    [SS]   1332 TSH is elevated, concern for hyperthyroidism. Compared to 2 years ago TSH has doubled. [SS]   5858 CBC does not show any evidence of acute process. Leukocytosis not present to suggest infection. Hemoglobin at baseline without evidence of acute anemia. Platelet count is normal.      [SS]   1432 Electrolytes are within range. Creatinine is not elevated more than baseline range making RAO unlikely. No significant transaminitis noted. Normal bilirubin. [SS]   5112 Troponin x2 is within acceptable ranges, ACS is ruled out. [SS]   1954 Picture of  asymptomatic bradycardia. Labs are within normal  limits otherwise. Patient will need outpatient evaluation for hypothyroidism. Discharge.    [SS]      ED Course User Index  [SS] Ekta Keith MD         Final Disposition     Disposition: Condition stable  DC- Adult Discharges: All of the diagnostic tests were reviewed and questions answered. Diagnosis, care plan and treatment options were discussed. The patient understands the instructions and will follow up as directed. The patients results have been reviewed with them. They have been counseled regarding their diagnosis.   The patient verbally convey understanding and agreement of the signs, symptoms, diagnosis, treatment and prognosis and additionally agrees to follow up as recommended with their PCP in 24 - 48 hours.  They also agree with the care-plan and convey that all of their questions have been answered. I have also put together some discharge instructions for them that include: 1) educational information regarding their diagnosis, 2) how to care for their diagnosis at home, as well a 3) list of reasons why they would want to return to the ED prior to their follow-up appointment, should their condition change. DISCHARGE PLAN:  1. Current Discharge Medication List      CONTINUE these medications which have NOT CHANGED    Details   meloxicam (MOBIC) 15 mg tablet       ibuprofen (MOTRIN) 600 mg tablet Take 1 Tab by mouth every six (6) hours as needed for Pain. Qty: 20 Tab, Refills: 0      amLODIPine (NORVASC) 5 mg tablet Take 5 mg by mouth daily. imipramine (TOFRANIL) 25 mg tablet Take 25 mg by mouth nightly. lithium carbonate 300 mg tablet Take 1 Tab by mouth two (2) times a day. One capsule in the morning and 2 capsules at bedtime    Associated Diagnoses: Senile osteoporosis; Hypercalcemia; Multinodular goiter      OLANZapine (ZYPREXA) 20 mg tablet Take 10 mg by mouth nightly. Associated Diagnoses: Senile osteoporosis; Hypercalcemia; Multinodular goiter           2. Follow-up Information     Follow up With Specialties Details Why Contact Info    Your doctor  Schedule an appointment as soon as possible for a visit in 1 day For evaluation for hypothyroidism         3. Return to ED if worse   4. Current Discharge Medication List          ED Procedures & Consultations   Performed by: Dez August MD  Procedures     EKG interpretation (Preliminary):  Rhythm: normal sinus rhythm; and bradycardic . Rate (approx.): 46. Axis: normal;  IA interval: normal;  QRS interval: normal ;  ST/T wave: normal;  Other findings: abnormal EKG: Sinus bradycardia. Diagnosis     Clinical Impression:   1. Bradycardia    2.  Elevated TSH        Attestations:    Dez August MD    Please note that this dictation was completed with Dragon, the computer voice recognition software. Quite often unanticipated grammatical, syntax, homophones, and other interpretive errors are inadvertently transcribed by the computer software. Please disregard these errors. Please excuse any errors that have escaped final proofreading. Thank you.

## 2021-10-13 ENCOUNTER — APPOINTMENT (OUTPATIENT)
Dept: GENERAL RADIOLOGY | Age: 66
DRG: 309 | End: 2021-10-13
Attending: PHYSICIAN ASSISTANT
Payer: MEDICARE

## 2021-10-13 ENCOUNTER — TELEPHONE (OUTPATIENT)
Dept: SURGERY | Age: 66
End: 2021-10-13

## 2021-10-13 LAB
ANION GAP SERPL CALC-SCNC: 3 MMOL/L (ref 5–15)
ATRIAL RATE: 62 BPM
BUN SERPL-MCNC: 13 MG/DL (ref 6–20)
BUN/CREAT SERPL: 8 (ref 12–20)
CA-I BLD-MCNC: 10.8 MG/DL (ref 8.5–10.1)
CALCULATED P AXIS, ECG09: 69 DEGREES
CALCULATED R AXIS, ECG10: 137 DEGREES
CALCULATED T AXIS, ECG11: 75 DEGREES
CHLORIDE SERPL-SCNC: 113 MMOL/L (ref 97–108)
CO2 SERPL-SCNC: 32 MMOL/L (ref 21–32)
CREAT SERPL-MCNC: 1.57 MG/DL (ref 0.55–1.02)
DATE LAST DOSE: ABNORMAL
DIAGNOSIS, 93000: NORMAL
ERYTHROCYTE [DISTWIDTH] IN BLOOD BY AUTOMATED COUNT: 12.6 % (ref 11.5–14.5)
GLUCOSE SERPL-MCNC: 98 MG/DL (ref 65–100)
HCT VFR BLD AUTO: 36.8 % (ref 35–47)
HGB BLD-MCNC: 11.1 G/DL (ref 11.5–16)
LITHIUM SERPL-SCNC: 1.6 MMOL/L (ref 0.6–1.2)
MCH RBC QN AUTO: 31.4 PG (ref 26–34)
MCHC RBC AUTO-ENTMCNC: 30.2 G/DL (ref 30–36.5)
MCV RBC AUTO: 104.2 FL (ref 80–99)
NRBC # BLD: 0 K/UL (ref 0–0.01)
NRBC BLD-RTO: 0 PER 100 WBC
P-R INTERVAL, ECG05: 220 MS
PLATELET # BLD AUTO: 247 K/UL (ref 150–400)
PMV BLD AUTO: 11.3 FL (ref 8.9–12.9)
POTASSIUM SERPL-SCNC: 3.7 MMOL/L (ref 3.5–5.1)
Q-T INTERVAL, ECG07: 428 MS
QRS DURATION, ECG06: 90 MS
QTC CALCULATION (BEZET), ECG08: 434 MS
RBC # BLD AUTO: 3.53 M/UL (ref 3.8–5.2)
REPORTED DOSE,DOSE: ABNORMAL UNITS
SODIUM SERPL-SCNC: 148 MMOL/L (ref 136–145)
T3FREE SERPL-MCNC: 2.2 PG/ML (ref 2.2–4)
T4 FREE SERPL-MCNC: 0.9 NG/DL (ref 0.8–1.5)
T4 FREE SERPL-MCNC: 0.9 NG/DL (ref 0.8–1.5)
VENTRICULAR RATE, ECG03: 62 BPM
WBC # BLD AUTO: 7.6 K/UL (ref 3.6–11)

## 2021-10-13 PROCEDURE — 65270000029 HC RM PRIVATE

## 2021-10-13 PROCEDURE — 80178 ASSAY OF LITHIUM: CPT

## 2021-10-13 PROCEDURE — 85027 COMPLETE CBC AUTOMATED: CPT

## 2021-10-13 PROCEDURE — 73610 X-RAY EXAM OF ANKLE: CPT

## 2021-10-13 PROCEDURE — 36415 COLL VENOUS BLD VENIPUNCTURE: CPT

## 2021-10-13 PROCEDURE — 84439 ASSAY OF FREE THYROXINE: CPT

## 2021-10-13 PROCEDURE — 80048 BASIC METABOLIC PNL TOTAL CA: CPT

## 2021-10-13 PROCEDURE — 74011250636 HC RX REV CODE- 250/636: Performed by: INTERNAL MEDICINE

## 2021-10-13 PROCEDURE — 74011250637 HC RX REV CODE- 250/637: Performed by: INTERNAL MEDICINE

## 2021-10-13 RX ADMIN — Medication 10 ML: at 22:51

## 2021-10-13 RX ADMIN — OLANZAPINE 10 MG: 5 TABLET, FILM COATED ORAL at 21:47

## 2021-10-13 RX ADMIN — ACETAMINOPHEN 650 MG: 325 TABLET ORAL at 21:47

## 2021-10-13 RX ADMIN — IMIPRAMINE HYDROCHLORIDE 25 MG: 25 TABLET ORAL at 21:47

## 2021-10-13 RX ADMIN — ENOXAPARIN SODIUM 40 MG: 40 INJECTION SUBCUTANEOUS at 08:49

## 2021-10-13 RX ADMIN — AMLODIPINE BESYLATE 5 MG: 5 TABLET ORAL at 08:49

## 2021-10-13 RX ADMIN — Medication 10 ML: at 06:02

## 2021-10-13 NOTE — H&P
GENERAL GENERIC H&P/CONSULT    Subjective:  53-year-old female with past medical history including hypertension, schizophrenia. Earlier today she was evaluated in the emergency department for complaints including generalized weakness and multiple falls over the past 1 week. This evening patient is brought back to the emergency department by her brother with reported increasing lethargy. Patient appears exhausted, she is oriented x3. Reports feeling off balance leading to her falls. Denies nausea, vomiting, diarrhea. Denies shortness of breath, cough or chest pain. Patient is bradycardic, heart rate in the 40s, EKG shows sinus bradycardia with first-degree AVB. Worsening creatinine at 1.79. Normal serum ammonia level, CT head without acute intracranial process. Past Medical History:   Diagnosis Date    Hypertension     Psychotic disorder ([facility] [address]     Schizophrenia      Past Surgical History:   Procedure Laterality Date    HX COLONOSCOPY      HX OTHER SURGICAL      Ingrown toenail removal      Prior to Admission medications    Medication Sig Start Date End Date Taking? Authorizing Provider   meloxicam (MOBIC) 15 mg tablet  8/23/21   Provider, Historical   ibuprofen (MOTRIN) 600 mg tablet Take 1 Tab by mouth every six (6) hours as needed for Pain. 4/14/21   Heather Sims MD   amLODIPine (NORVASC) 5 mg tablet Take 5 mg by mouth daily. Provider, Historical   imipramine (TOFRANIL) 25 mg tablet Take 25 mg by mouth nightly. Provider, Historical   lithium carbonate 300 mg tablet Take 1 Tab by mouth two (2) times a day. One capsule in the morning and 2 capsules at bedtime 6/6/17   Provider, Historical   OLANZapine (ZYPREXA) 20 mg tablet Take 10 mg by mouth nightly.  5/14/17   Provider, Historical     No Known Allergies   Social History     Tobacco Use    Smoking status: Current Every Day Smoker     Packs/day: 0.50     Years: 45.00     Pack years: 22.50    Smokeless tobacco: Never Used   Substance Use Topics    Alcohol use: Not Currently      Family History   Problem Relation Age of Onset    No Known Problems Mother     No Known Problems Father       Review of Systems   Constitutional: Positive for fatigue. Negative for appetite change, chills, diaphoresis and fever. Generalized weakness  Multiple falls    HENT: Negative. Eyes: Negative for pain and visual disturbance. Respiratory: Negative. Cardiovascular: Negative. Gastrointestinal: Negative. Endocrine: Negative. Genitourinary: Negative. Neurological: Negative. Objective:    No intake/output data recorded. No intake/output data recorded. Patient Vitals for the past 8 hrs:   BP Temp Pulse Resp SpO2 Height Weight   10/12/21 1936 -- -- -- -- -- 5' 7\" (1.702 m) 80.7 kg (178 lb)   10/12/21 1935 120/69 98.6 °F (37 °C) (!) 44 16 100 % -- --     Physical Exam  Constitutional:       General: She is not in acute distress. Appearance: Normal appearance. Comments: Drowsy   HENT:      Head: Normocephalic and atraumatic. Mouth/Throat:      Pharynx: Oropharynx is clear. Eyes:      Extraocular Movements: Extraocular movements intact. Conjunctiva/sclera: Conjunctivae normal.      Pupils: Pupils are equal, round, and reactive to light. Cardiovascular:      Rate and Rhythm: Bradycardia present. Pulses: Normal pulses. Heart sounds: Normal heart sounds. No murmur heard. No friction rub. No gallop. Pulmonary:      Effort: Pulmonary effort is normal.      Breath sounds: Normal breath sounds. Abdominal:      General: Bowel sounds are normal.      Palpations: Abdomen is soft. Musculoskeletal:         General: Normal range of motion. Cervical back: Normal range of motion and neck supple. Skin:     General: Skin is warm and dry. Neurological:      General: No focal deficit present. Mental Status: She is oriented to person, place, and time. Cranial Nerves: No cranial nerve deficit. Motor: No weakness. Labs:    Recent Results (from the past 24 hour(s))   EKG, 12 LEAD, INITIAL    Collection Time: 10/12/21 11:43 AM   Result Value Ref Range    Ventricular Rate 74 BPM    Atrial Rate 74 BPM    P-R Interval 214 ms    QRS Duration 94 ms    Q-T Interval 372 ms    QTC Calculation (Bezet) 412 ms    Calculated P Axis 76 degrees    Calculated R Axis 137 degrees    Calculated T Axis 49 degrees    Diagnosis       Sinus rhythm with 1st degree A-V block  Biatrial enlargement  Right axis deviation  Pulmonary disease pattern  Incomplete right bundle branch block  Nonspecific T wave abnormality  Abnormal ECG  When compared with ECG of 03-OCT-2016 11:40,  Incomplete right bundle branch block is now Present  T wave inversion now evident in Lateral leads  QT has shortened  Confirmed by Surinder Amezcua ([id number]) on 10/12/2021 1:43:59 PM     CBC WITH AUTOMATED DIFF    Collection Time: 10/12/21 11:50 AM   Result Value Ref Range    WBC 8.7 3.6 - 11.0 K/uL    RBC 3.77 (L) 3.80 - 5.20 M/uL    HGB 12.1 11.5 - 16.0 g/dL    HCT 37.8 35.0 - 47.0 %    .3 (H) 80.0 - 99.0 FL    MCH 32.1 26.0 - 34.0 PG    MCHC 32.0 30.0 - 36.5 g/dL    RDW 12.5 11.5 - 14.5 %    PLATELET 822 841 - 515 K/uL    MPV 12.2 8.9 - 12.9 FL    NRBC 0.0 0.0  WBC    ABSOLUTE NRBC 0.00 0.00 - 0.01 K/uL    NEUTROPHILS 79 (H) 32 - 75 %    LYMPHOCYTES 12 12 - 49 %    MONOCYTES 7 5 - 13 %    EOSINOPHILS 2 0 - 7 %    BASOPHILS 0 0 - 1 %    IMMATURE GRANULOCYTES 0 0 - 0.5 %    ABS. NEUTROPHILS 6.9 1.8 - 8.0 K/UL    ABS. LYMPHOCYTES 1.0 0.8 - 3.5 K/UL    ABS. MONOCYTES 0.6 0.0 - 1.0 K/UL    ABS. EOSINOPHILS 0.2 0.0 - 0.4 K/UL    ABS. BASOPHILS 0.0 0.0 - 0.1 K/UL    ABS. IMM.  GRANS. 0.0 0.00 - 0.04 K/UL    DF AUTOMATED     TROPONIN-HIGH SENSITIVITY    Collection Time: 10/12/21 11:50 AM   Result Value Ref Range    Troponin-High Sensitivity 21 0 - 51 ng/L   TSH 3RD GENERATION    Collection Time: 10/12/21 11:50 AM   Result Value Ref Range    TSH 4.96 (H) 0.36 - 5.12 uIU/mL   METABOLIC PANEL, COMPREHENSIVE    Collection Time: 10/12/21  1:00 PM   Result Value Ref Range    Sodium 144 136 - 145 mmol/L    Potassium 3.7 3.5 - 5.1 mmol/L    Chloride 113 (H) 97 - 108 mmol/L    CO2 26 21 - 32 mmol/L    Anion gap 5 5 - 15 mmol/L    Glucose 80 65 - 100 mg/dL    BUN 14 6 - 20 mg/dL    Creatinine 1.34 (H) 0.55 - 1.02 mg/dL    BUN/Creatinine ratio 10 (L) 12 - 20      GFR est AA 48 (L) >60 ml/min/1.73m2    GFR est non-AA 40 (L) >60 ml/min/1.73m2    Calcium 9.8 8.5 - 10.1 mg/dL    Bilirubin, total 0.7 0.2 - 1.0 mg/dL    AST (SGOT) 62 (H) 15 - 37 U/L    ALT (SGPT) 48 12 - 78 U/L    Alk. phosphatase 83 45 - 117 U/L    Protein, total 6.0 (L) 6.4 - 8.2 g/dL    Albumin 3.3 (L) 3.5 - 5.0 g/dL    Globulin 2.7 2.0 - 4.0 g/dL    A-G Ratio 1.2 1.1 - 2.2     TROPONIN-HIGH SENSITIVITY    Collection Time: 10/12/21  2:35 PM   Result Value Ref Range    Troponin-High Sensitivity 26 0 - 51 ng/L   NT-PRO BNP    Collection Time: 10/12/21  2:35 PM   Result Value Ref Range    NT pro-BNP 56 <125 pg/mL   CBC WITH AUTOMATED DIFF    Collection Time: 10/12/21  7:45 PM   Result Value Ref Range    WBC 8.5 3.6 - 11.0 K/uL    RBC 4.12 3.80 - 5.20 M/uL    HGB 12.9 11.5 - 16.0 g/dL    HCT 42.2 35.0 - 47.0 %    .4 (H) 80.0 - 99.0 FL    MCH 31.3 26.0 - 34.0 PG    MCHC 30.6 30.0 - 36.5 g/dL    RDW 12.4 11.5 - 14.5 %    PLATELET 598 591 - 479 K/uL    MPV 11.0 8.9 - 12.9 FL    NRBC 0.0 0.0  WBC    ABSOLUTE NRBC 0.00 0.00 - 0.01 K/uL    NEUTROPHILS 79 (H) 32 - 75 %    LYMPHOCYTES 11 (L) 12 - 49 %    MONOCYTES 7 5 - 13 %    EOSINOPHILS 3 0 - 7 %    BASOPHILS 0 0 - 1 %    IMMATURE GRANULOCYTES 0 0 - 0.5 %    ABS. NEUTROPHILS 6.7 1.8 - 8.0 K/UL    ABS. LYMPHOCYTES 0.9 0.8 - 3.5 K/UL    ABS. MONOCYTES 0.6 0.0 - 1.0 K/UL    ABS. EOSINOPHILS 0.3 0.0 - 0.4 K/UL    ABS. BASOPHILS 0.0 0.0 - 0.1 K/UL    ABS. IMM.  GRANS. 0.0 0.00 - 0.04 K/UL    DF AUTOMATED     METABOLIC PANEL, COMPREHENSIVE    Collection Time: 10/12/21  7:45 PM   Result Value Ref Range    Sodium 143 136 - 145 mmol/L    Potassium 4.6 3.5 - 5.1 mmol/L    Chloride 109 (H) 97 - 108 mmol/L    CO2 32 21 - 32 mmol/L    Anion gap 2 (L) 5 - 15 mmol/L    Glucose 81 65 - 100 mg/dL    BUN 14 6 - 20 mg/dL    Creatinine 1.79 (H) 0.55 - 1.02 mg/dL    BUN/Creatinine ratio 8 (L) 12 - 20      GFR est AA 35 (L) >60 ml/min/1.73m2    GFR est non-AA 29 (L) >60 ml/min/1.73m2    Calcium 12.0 (H) 8.5 - 10.1 mg/dL    Bilirubin, total 0.8 0.2 - 1.0 mg/dL    AST (SGOT) 69 (H) 15 - 37 U/L    ALT (SGPT) 65 12 - 78 U/L    Alk. phosphatase 105 45 - 117 U/L    Protein, total 7.5 6.4 - 8.2 g/dL    Albumin 4.2 3.5 - 5.0 g/dL    Globulin 3.3 2.0 - 4.0 g/dL    A-G Ratio 1.3 1.1 - 2.2     TROPONIN-HIGH SENSITIVITY    Collection Time: 10/12/21  7:45 PM   Result Value Ref Range    Troponin-High Sensitivity 22 0 - 51 ng/L         Assessment:    Generalized weakness  -Bradycardic, decreased mentation, RAO  -? Dehydration? Lithium toxicity  -Hold lithium, obtain serum level of lithium  -Nonfocal physical exam, CT head without intracranial process  -PT/OT evaluation    Acute kidney injury on CKD stage III  -IV crystalloid challenge   -Hold meloxicam and ibuprofen. Hold lithium  -follow-up BMP     Hypertension  -Amlodipine    Schizophrenia  -Continue Zyprexa.   Lithium is on hold    DVT prophylaxis: Lovenox    Full code    Signed:  Laura Quinteros MD 10/12/2021

## 2021-10-13 NOTE — TELEPHONE ENCOUNTER
Maryam Zaida called concerning his mother. She is in the hospital at [facility]. He would like for you to see her for a consult. Thanks!

## 2021-10-13 NOTE — ED PROVIDER NOTES
EMERGENCY DEPARTMENT HISTORY AND PHYSICAL EXAM      Date: 10/12/2021  Patient Name: Cami Brandt    History of Presenting Illness     Chief Complaint   Patient presents with    Fatigue       HPI: Cami Brandt, 61 y.o. female with a past medical history of hypertension amlodipine and schizophrenia on Zyprexa presented today for generalized weakness. Patient was evaluated earlier in the morning by myself and was found to have new onset bradycardia with a TSH of 4.9. EKG showed first-degree AV block and sinus bradycardia but otherwise had a negative work-up. Patient was discharged. She was brought in again by her brother because of unchanging symptoms. Patient has been fatigued for the past month and has been declining. She has not been able to take care of herself and has had multiple falls over the past 2 days. Currently the patient is asymptomatic but was found to be sleeping in triage. Brother said that he is unable to take care of her at home and is unable to lift her up. Patient is compliant otherwise with her medications. PCP: Other, MD Reji    Current Outpatient Medications   Medication Sig Dispense Refill    meloxicam (MOBIC) 15 mg tablet       ibuprofen (MOTRIN) 600 mg tablet Take 1 Tab by mouth every six (6) hours as needed for Pain. 20 Tab 0    amLODIPine (NORVASC) 5 mg tablet Take 5 mg by mouth daily.  imipramine (TOFRANIL) 25 mg tablet Take 25 mg by mouth nightly.  lithium carbonate 300 mg tablet Take 1 Tab by mouth two (2) times a day. One capsule in the morning and 2 capsules at bedtime      OLANZapine (ZYPREXA) 20 mg tablet Take 10 mg by mouth nightly.          Medical History   I reviewed the medical, surgical, family, and social history, as well as allergies:    Past Medical History:  Past Medical History:   Diagnosis Date    Hypertension     Psychotic disorder ([facility] [address]     Schizophrenia       Past Surgical History:  Past Surgical History:   Procedure Laterality Date  HX COLONOSCOPY      HX OTHER SURGICAL      Ingrown toenail removal       Family History:  Family History   Problem Relation Age of Onset    No Known Problems Mother     No Known Problems Father        Social History:  Social History     Tobacco Use    Smoking status: Current Every Day Smoker     Packs/day: 0.50     Years: 45.00     Pack years: 22.50    Smokeless tobacco: Never Used   Vaping Use    Vaping Use: Never used   Substance Use Topics    Alcohol use: Not Currently    Drug use: Never       Allergies:  No Known Allergies    Review of Systems     Review of Systems   Constitutional: Positive for fatigue. Negative for chills and fever. HENT: Negative for congestion, rhinorrhea and sore throat. Eyes: Negative. Respiratory: Negative for cough and shortness of breath. Cardiovascular: Negative for chest pain and leg swelling. Gastrointestinal: Negative for abdominal pain and vomiting. Endocrine: Negative. Genitourinary: Negative for dysuria and hematuria. Musculoskeletal: Negative for back pain and myalgias. Skin: Negative for rash and wound. Allergic/Immunologic: Negative. Neurological: Negative for light-headedness and numbness. Hematological: Negative. Psychiatric/Behavioral: Negative for agitation and confusion. Physical Exam and Vital Signs   Vital Signs - Reviewed the patient's vital signs.     Patient Vitals for the past 12 hrs:   Temp Pulse Resp BP SpO2   10/12/21 1935 98.6 °F (37 °C) (!) 44 16 120/69 100 %       Physical Exam:    GENERAL: awake, alert, cooperative, not in distress  HEENT:  * Pupils equal, EOMI  * Head atraumatic  CV:  * regular rhythm, bradycardia  * warm and perfused extremities bilaterally  PULMONARY: Good air movement, no wheezes or crackles  ABDOMEN: soft, not distended, no guarding, not tenderness to palpation  : No suprapubic tenderness  EXTREMITIES/BACK: warm and perfused, no tenderness, no edema  SKIN: no rashes or signs of trauma  NEURO:  * Speech clear  * Moves U&LE to command      Medical Decision Making and ED Course   - I am the first and primary provider for this patient and am the primary provider of record. - I reviewed the vital signs, available nursing notes, past medical history, past surgical history, family history and social history. - Initial assessment performed. The patients presenting problems have been discussed, and the staff are in agreement with the care plan formulated and outlined with them. I have encouraged them to ask questions as they arise throughout their visit. - Available medical records, nursing notes, old EKGs, and EMS run sheets (if patient was EMS transported) were reviewed    MDM:   Patient is a 61 y.o. female presenting for fatigue with a recently low TSH level. Vitals reveal heart rate of 44 and physical exam reveals no abnormalities. EKG showed bradycardia with a first-degree AV block. Based on the history, physical exam, risk factors, and vitals signs, differential includes: Hypothyroidism, ICH, pneumonia, pneumothorax, hemothorax, electrolyte abnormalities.       Results     Labs:  Recent Results (from the past 12 hour(s))   EKG, 12 LEAD, INITIAL    Collection Time: 10/12/21 11:43 AM   Result Value Ref Range    Ventricular Rate 74 BPM    Atrial Rate 74 BPM    P-R Interval 214 ms    QRS Duration 94 ms    Q-T Interval 372 ms    QTC Calculation (Bezet) 412 ms    Calculated P Axis 76 degrees    Calculated R Axis 137 degrees    Calculated T Axis 49 degrees    Diagnosis       Sinus rhythm with 1st degree A-V block  Biatrial enlargement  Right axis deviation  Pulmonary disease pattern  Incomplete right bundle branch block  Nonspecific T wave abnormality  Abnormal ECG  When compared with ECG of 03-OCT-2016 11:40,  Incomplete right bundle branch block is now Present  T wave inversion now evident in Lateral leads  QT has shortened  Confirmed by Dejah Byrne ([id number]) on 10/12/2021 1:43:59 PM     CBC WITH AUTOMATED DIFF    Collection Time: 10/12/21 11:50 AM   Result Value Ref Range    WBC 8.7 3.6 - 11.0 K/uL    RBC 3.77 (L) 3.80 - 5.20 M/uL    HGB 12.1 11.5 - 16.0 g/dL    HCT 37.8 35.0 - 47.0 %    .3 (H) 80.0 - 99.0 FL    MCH 32.1 26.0 - 34.0 PG    MCHC 32.0 30.0 - 36.5 g/dL    RDW 12.5 11.5 - 14.5 %    PLATELET 985 022 - 087 K/uL    MPV 12.2 8.9 - 12.9 FL    NRBC 0.0 0.0  WBC    ABSOLUTE NRBC 0.00 0.00 - 0.01 K/uL    NEUTROPHILS 79 (H) 32 - 75 %    LYMPHOCYTES 12 12 - 49 %    MONOCYTES 7 5 - 13 %    EOSINOPHILS 2 0 - 7 %    BASOPHILS 0 0 - 1 %    IMMATURE GRANULOCYTES 0 0 - 0.5 %    ABS. NEUTROPHILS 6.9 1.8 - 8.0 K/UL    ABS. LYMPHOCYTES 1.0 0.8 - 3.5 K/UL    ABS. MONOCYTES 0.6 0.0 - 1.0 K/UL    ABS. EOSINOPHILS 0.2 0.0 - 0.4 K/UL    ABS. BASOPHILS 0.0 0.0 - 0.1 K/UL    ABS. IMM. GRANS. 0.0 0.00 - 0.04 K/UL    DF AUTOMATED     TROPONIN-HIGH SENSITIVITY    Collection Time: 10/12/21 11:50 AM   Result Value Ref Range    Troponin-High Sensitivity 21 0 - 51 ng/L   TSH 3RD GENERATION    Collection Time: 10/12/21 11:50 AM   Result Value Ref Range    TSH 4.96 (H) 0.36 - 9.65 uIU/mL   METABOLIC PANEL, COMPREHENSIVE    Collection Time: 10/12/21  1:00 PM   Result Value Ref Range    Sodium 144 136 - 145 mmol/L    Potassium 3.7 3.5 - 5.1 mmol/L    Chloride 113 (H) 97 - 108 mmol/L    CO2 26 21 - 32 mmol/L    Anion gap 5 5 - 15 mmol/L    Glucose 80 65 - 100 mg/dL    BUN 14 6 - 20 mg/dL    Creatinine 1.34 (H) 0.55 - 1.02 mg/dL    BUN/Creatinine ratio 10 (L) 12 - 20      GFR est AA 48 (L) >60 ml/min/1.73m2    GFR est non-AA 40 (L) >60 ml/min/1.73m2    Calcium 9.8 8.5 - 10.1 mg/dL    Bilirubin, total 0.7 0.2 - 1.0 mg/dL    AST (SGOT) 62 (H) 15 - 37 U/L    ALT (SGPT) 48 12 - 78 U/L    Alk.  phosphatase 83 45 - 117 U/L    Protein, total 6.0 (L) 6.4 - 8.2 g/dL    Albumin 3.3 (L) 3.5 - 5.0 g/dL    Globulin 2.7 2.0 - 4.0 g/dL    A-G Ratio 1.2 1.1 - 2.2     TROPONIN-HIGH SENSITIVITY    Collection Time: 10/12/21  2:35 PM Result Value Ref Range    Troponin-High Sensitivity 26 0 - 51 ng/L   NT-PRO BNP    Collection Time: 10/12/21  2:35 PM   Result Value Ref Range    NT pro-BNP 56 <125 pg/mL   CBC WITH AUTOMATED DIFF    Collection Time: 10/12/21  7:45 PM   Result Value Ref Range    WBC 8.5 3.6 - 11.0 K/uL    RBC 4.12 3.80 - 5.20 M/uL    HGB 12.9 11.5 - 16.0 g/dL    HCT 42.2 35.0 - 47.0 %    .4 (H) 80.0 - 99.0 FL    MCH 31.3 26.0 - 34.0 PG    MCHC 30.6 30.0 - 36.5 g/dL    RDW 12.4 11.5 - 14.5 %    PLATELET 055 913 - 124 K/uL    MPV 11.0 8.9 - 12.9 FL    NRBC 0.0 0.0  WBC    ABSOLUTE NRBC 0.00 0.00 - 0.01 K/uL    NEUTROPHILS 79 (H) 32 - 75 %    LYMPHOCYTES 11 (L) 12 - 49 %    MONOCYTES 7 5 - 13 %    EOSINOPHILS 3 0 - 7 %    BASOPHILS 0 0 - 1 %    IMMATURE GRANULOCYTES 0 0 - 0.5 %    ABS. NEUTROPHILS 6.7 1.8 - 8.0 K/UL    ABS. LYMPHOCYTES 0.9 0.8 - 3.5 K/UL    ABS. MONOCYTES 0.6 0.0 - 1.0 K/UL    ABS. EOSINOPHILS 0.3 0.0 - 0.4 K/UL    ABS. BASOPHILS 0.0 0.0 - 0.1 K/UL    ABS. IMM. GRANS. 0.0 0.00 - 0.04 K/UL    DF AUTOMATED     METABOLIC PANEL, COMPREHENSIVE    Collection Time: 10/12/21  7:45 PM   Result Value Ref Range    Sodium 143 136 - 145 mmol/L    Potassium 4.6 3.5 - 5.1 mmol/L    Chloride 109 (H) 97 - 108 mmol/L    CO2 32 21 - 32 mmol/L    Anion gap 2 (L) 5 - 15 mmol/L    Glucose 81 65 - 100 mg/dL    BUN 14 6 - 20 mg/dL    Creatinine 1.79 (H) 0.55 - 1.02 mg/dL    BUN/Creatinine ratio 8 (L) 12 - 20      GFR est AA 35 (L) >60 ml/min/1.73m2    GFR est non-AA 29 (L) >60 ml/min/1.73m2    Calcium 12.0 (H) 8.5 - 10.1 mg/dL    Bilirubin, total 0.8 0.2 - 1.0 mg/dL    AST (SGOT) 69 (H) 15 - 37 U/L    ALT (SGPT) 65 12 - 78 U/L    Alk.  phosphatase 105 45 - 117 U/L    Protein, total 7.5 6.4 - 8.2 g/dL    Albumin 4.2 3.5 - 5.0 g/dL    Globulin 3.3 2.0 - 4.0 g/dL    A-G Ratio 1.3 1.1 - 2.2     TROPONIN-HIGH SENSITIVITY    Collection Time: 10/12/21  7:45 PM   Result Value Ref Range    Troponin-High Sensitivity 22 0 - 51 ng/L Radiologic Studies:  CT Results  (Last 48 hours)               10/12/21 2023  CT HEAD WO CONT Final result    Impression:  1. No acute intracranial findings. Narrative: Altered mental status. Comparison brain MRI 1/12/2017. Technique: Axial images  head without IV contrast. Multiplanar reformatting   performed. Dose reduction: All CT scans at this facility are performed using dose reduction   optimization techniques as appropriate to a performed exam including the   following: Automated exposure control, adjustments of the mA and/or kV according   to patient's size, or use of iterative reconstruction technique. Findings: Unchanged right posterior cyst. No mass effect, extra-axial fluid   collection, hemorrhage. CSF-containing structures normal size, shape, position. No calvarial fractures. Included facial sinuses and mastoid air cells are   unopacified. CXR Results  (Last 48 hours)               10/12/21 2013  XR CHEST PORT Final result    Narrative: Indication: Chest pain. PA chest 2010 hours 10/12/2021. Comparison examination earlier the same date. Enlarged cardiopericardial silhouette which could represent cardiomegaly,   pericardial effusion or combination is unchanged. Lungs remain clear. No pneumothorax or pleural effusion. Normal bones. 10/12/21 1243  XR CHEST PORT Final result    Narrative:  1 view       Cardiomegaly without congestion. Clear lungs. No effusion or pneumothorax             Medications ordered:  Medications - No data to display     ED Course     ED Course:     ED Course as of Oct 12 2135   Tue Oct 12, 2021   2111 Chest x-ray negative, no concern for pulmonary edema, pleural effusion, pneumothorax, or pneumonia. The non-contrasted head CT was negative for acute process making intracranial bleed unlikely. No evidence of large subacute stroke, ventriculomegaly, or masses.       [SS]   2132 CBC does not show any evidence of acute process. Leukocytosis not present to suggest infection. Hemoglobin at baseline without evidence of acute anemia. Platelet count is normal.    Electrolytes are within range. Creatinine is not elevated more than baseline range making RAO unlikely. No significant transaminitis noted. Normal bilirubin. Trop is 22, will repeat.    [SS]   2133 Discussed the case with hospitalist.  Due to the declining functional status, bradycardia, and high TSH, will admit to observation.    [SS]      ED Course User Index  [SS] Breann Ferguson MD       Final Disposition     Disposition: Condition stable  Admitted to Observation Unit the case was discussed with the admitting physician     ADMISSION: After completion of ED workup and discussion of results and diagnoses with the patient, patient was admitted to the hospital. All the patient's questions were answered. Case was discussed with the receiving team.      Diagnosis     Clinical Impression:   1. Bradycardia    2. Fatigue, unspecified type    3. Hypothyroidism, unspecified type        Attestations:    Lorrie Stallings MD    Please note that this dictation was completed with [employer], the computer voice recognition software. Quite often unanticipated grammatical, syntax, homophones, and other interpretive errors are inadvertently transcribed by the computer software. Please disregard these errors. Please excuse any errors that have escaped final proofreading. Thank you.

## 2021-10-13 NOTE — PROGRESS NOTES
Hospitalist Progress Note    Subjective:   Daily Progress Note: 10/13/2021 11:31 AM    Oriented to time person place. Left ankle pain. Current Facility-Administered Medications   Medication Dose Route Frequency    [Held by provider] lithium carbonate tablet 300 mg  300 mg Oral BID    OLANZapine (ZyPREXA) tablet 10 mg  10 mg Oral QHS    amLODIPine (NORVASC) tablet 5 mg  5 mg Oral DAILY    imipramine (TOFRANIL) tablet 25 mg  25 mg Oral QHS    sodium chloride (NS) flush 5-40 mL  5-40 mL IntraVENous Q8H    sodium chloride (NS) flush 5-40 mL  5-40 mL IntraVENous PRN    acetaminophen (TYLENOL) tablet 650 mg  650 mg Oral Q6H PRN    Or    acetaminophen (TYLENOL) suppository 650 mg  650 mg Rectal Q6H PRN    polyethylene glycol (MIRALAX) packet 17 g  17 g Oral DAILY PRN    ondansetron (ZOFRAN ODT) tablet 4 mg  4 mg Oral Q8H PRN    Or    ondansetron (ZOFRAN) injection 4 mg  4 mg IntraVENous Q6H PRN    enoxaparin (LOVENOX) injection 40 mg  40 mg SubCUTAneous DAILY    0.9% sodium chloride infusion  100 mL/hr IntraVENous CONTINUOUS        Review of Systems  Review of Systems   Constitutional: Positive for malaise/fatigue. HENT: Negative. Eyes: Negative. Respiratory: Negative for cough and shortness of breath. Cardiovascular: Negative for chest pain and leg swelling. Gastrointestinal: Negative for abdominal pain, nausea and vomiting. Genitourinary: Negative. Musculoskeletal: Negative. Skin: Negative. Neurological: Positive for weakness. Psychiatric/Behavioral: Negative.              Objective:     Visit Vitals  BP (!) 138/91 (BP 1 Location: Left upper arm, BP Patient Position: At rest)   Pulse 71   Temp 97.7 °F (36.5 °C)   Resp 18   Ht 5' 7\" (1.702 m)   Wt 80.7 kg (178 lb)   SpO2 97%   Breastfeeding No   BMI 27.88 kg/m²      O2 Device: None (Room air)    Temp (24hrs), Av.2 °F (36.8 °C), Min:97.7 °F (36.5 °C), Max:98.6 °F (37 °C)      10/13 07 - 10/13 1900  In: -   Out: 900 [Urine:900]  No intake/output data recorded. Recent Results (from the past 24 hour(s))   EKG, 12 LEAD, INITIAL    Collection Time: 10/12/21 11:43 AM   Result Value Ref Range    Ventricular Rate 74 BPM    Atrial Rate 74 BPM    P-R Interval 214 ms    QRS Duration 94 ms    Q-T Interval 372 ms    QTC Calculation (Bezet) 412 ms    Calculated P Axis 76 degrees    Calculated R Axis 137 degrees    Calculated T Axis 49 degrees    Diagnosis       Sinus rhythm with 1st degree A-V block  Biatrial enlargement  Right axis deviation  Pulmonary disease pattern  Incomplete right bundle branch block  Nonspecific T wave abnormality  Abnormal ECG  When compared with ECG of 03-OCT-2016 11:40,  Incomplete right bundle branch block is now Present  T wave inversion now evident in Lateral leads  QT has shortened  Confirmed by Surinder Amezcua ([id number]) on 10/12/2021 1:43:59 PM     CBC WITH AUTOMATED DIFF    Collection Time: 10/12/21 11:50 AM   Result Value Ref Range    WBC 8.7 3.6 - 11.0 K/uL    RBC 3.77 (L) 3.80 - 5.20 M/uL    HGB 12.1 11.5 - 16.0 g/dL    HCT 37.8 35.0 - 47.0 %    .3 (H) 80.0 - 99.0 FL    MCH 32.1 26.0 - 34.0 PG    MCHC 32.0 30.0 - 36.5 g/dL    RDW 12.5 11.5 - 14.5 %    PLATELET 945 158 - 019 K/uL    MPV 12.2 8.9 - 12.9 FL    NRBC 0.0 0.0  WBC    ABSOLUTE NRBC 0.00 0.00 - 0.01 K/uL    NEUTROPHILS 79 (H) 32 - 75 %    LYMPHOCYTES 12 12 - 49 %    MONOCYTES 7 5 - 13 %    EOSINOPHILS 2 0 - 7 %    BASOPHILS 0 0 - 1 %    IMMATURE GRANULOCYTES 0 0 - 0.5 %    ABS. NEUTROPHILS 6.9 1.8 - 8.0 K/UL    ABS. LYMPHOCYTES 1.0 0.8 - 3.5 K/UL    ABS. MONOCYTES 0.6 0.0 - 1.0 K/UL    ABS. EOSINOPHILS 0.2 0.0 - 0.4 K/UL    ABS. BASOPHILS 0.0 0.0 - 0.1 K/UL    ABS. IMM.  GRANS. 0.0 0.00 - 0.04 K/UL    DF AUTOMATED     TROPONIN-HIGH SENSITIVITY    Collection Time: 10/12/21 11:50 AM   Result Value Ref Range    Troponin-High Sensitivity 21 0 - 51 ng/L   TSH 3RD GENERATION    Collection Time: 10/12/21 11:50 AM   Result Value Ref Range TSH 4.96 (H) 0.36 - 0.90 uIU/mL   METABOLIC PANEL, COMPREHENSIVE    Collection Time: 10/12/21  1:00 PM   Result Value Ref Range    Sodium 144 136 - 145 mmol/L    Potassium 3.7 3.5 - 5.1 mmol/L    Chloride 113 (H) 97 - 108 mmol/L    CO2 26 21 - 32 mmol/L    Anion gap 5 5 - 15 mmol/L    Glucose 80 65 - 100 mg/dL    BUN 14 6 - 20 mg/dL    Creatinine 1.34 (H) 0.55 - 1.02 mg/dL    BUN/Creatinine ratio 10 (L) 12 - 20      GFR est AA 48 (L) >60 ml/min/1.73m2    GFR est non-AA 40 (L) >60 ml/min/1.73m2    Calcium 9.8 8.5 - 10.1 mg/dL    Bilirubin, total 0.7 0.2 - 1.0 mg/dL    AST (SGOT) 62 (H) 15 - 37 U/L    ALT (SGPT) 48 12 - 78 U/L    Alk. phosphatase 83 45 - 117 U/L    Protein, total 6.0 (L) 6.4 - 8.2 g/dL    Albumin 3.3 (L) 3.5 - 5.0 g/dL    Globulin 2.7 2.0 - 4.0 g/dL    A-G Ratio 1.2 1.1 - 2.2     TROPONIN-HIGH SENSITIVITY    Collection Time: 10/12/21  2:35 PM   Result Value Ref Range    Troponin-High Sensitivity 26 0 - 51 ng/L   NT-PRO BNP    Collection Time: 10/12/21  2:35 PM   Result Value Ref Range    NT pro-BNP 56 <125 pg/mL   CBC WITH AUTOMATED DIFF    Collection Time: 10/12/21  7:45 PM   Result Value Ref Range    WBC 8.5 3.6 - 11.0 K/uL    RBC 4.12 3.80 - 5.20 M/uL    HGB 12.9 11.5 - 16.0 g/dL    HCT 42.2 35.0 - 47.0 %    .4 (H) 80.0 - 99.0 FL    MCH 31.3 26.0 - 34.0 PG    MCHC 30.6 30.0 - 36.5 g/dL    RDW 12.4 11.5 - 14.5 %    PLATELET 681 058 - 086 K/uL    MPV 11.0 8.9 - 12.9 FL    NRBC 0.0 0.0  WBC    ABSOLUTE NRBC 0.00 0.00 - 0.01 K/uL    NEUTROPHILS 79 (H) 32 - 75 %    LYMPHOCYTES 11 (L) 12 - 49 %    MONOCYTES 7 5 - 13 %    EOSINOPHILS 3 0 - 7 %    BASOPHILS 0 0 - 1 %    IMMATURE GRANULOCYTES 0 0 - 0.5 %    ABS. NEUTROPHILS 6.7 1.8 - 8.0 K/UL    ABS. LYMPHOCYTES 0.9 0.8 - 3.5 K/UL    ABS. MONOCYTES 0.6 0.0 - 1.0 K/UL    ABS. EOSINOPHILS 0.3 0.0 - 0.4 K/UL    ABS. BASOPHILS 0.0 0.0 - 0.1 K/UL    ABS. IMM.  GRANS. 0.0 0.00 - 0.04 K/UL    DF AUTOMATED     METABOLIC PANEL, COMPREHENSIVE Collection Time: 10/12/21  7:45 PM   Result Value Ref Range    Sodium 143 136 - 145 mmol/L    Potassium 4.6 3.5 - 5.1 mmol/L    Chloride 109 (H) 97 - 108 mmol/L    CO2 32 21 - 32 mmol/L    Anion gap 2 (L) 5 - 15 mmol/L    Glucose 81 65 - 100 mg/dL    BUN 14 6 - 20 mg/dL    Creatinine 1.79 (H) 0.55 - 1.02 mg/dL    BUN/Creatinine ratio 8 (L) 12 - 20      GFR est AA 35 (L) >60 ml/min/1.73m2    GFR est non-AA 29 (L) >60 ml/min/1.73m2    Calcium 12.0 (H) 8.5 - 10.1 mg/dL    Bilirubin, total 0.8 0.2 - 1.0 mg/dL    AST (SGOT) 69 (H) 15 - 37 U/L    ALT (SGPT) 65 12 - 78 U/L    Alk.  phosphatase 105 45 - 117 U/L    Protein, total 7.5 6.4 - 8.2 g/dL    Albumin 4.2 3.5 - 5.0 g/dL    Globulin 3.3 2.0 - 4.0 g/dL    A-G Ratio 1.3 1.1 - 2.2     TROPONIN-HIGH SENSITIVITY    Collection Time: 10/12/21  7:45 PM   Result Value Ref Range    Troponin-High Sensitivity 22 0 - 51 ng/L   AMMONIA    Collection Time: 10/12/21  9:12 PM   Result Value Ref Range    Ammonia 14 <32 umol/L   LITHIUM    Collection Time: 10/12/21 10:11 PM   Result Value Ref Range    Lithium level 2.09 (HH) 0.60 - 1.20 mmol/L    Reported dose date Dose Dependent      Reported dose: Dose Dependent Units   URINALYSIS W/ REFLEX CULTURE    Collection Time: 10/12/21 10:15 PM    Specimen: Urine   Result Value Ref Range    Color Yellow/Straw      Appearance Clear Clear      Specific gravity <1.005 1.003 - 1.030    pH (UA) 7.0 5.0 - 8.0      Protein Negative Negative mg/dL    Glucose Negative Negative mg/dL    Ketone Negative Negative mg/dL    Bilirubin Negative Negative      Blood Negative Negative      Urobilinogen 0.1 0.1 - 1.0 EU/dL    Nitrites Negative Negative      Leukocyte Esterase Negative Negative      UA:UC IF INDICATED Culture not indicated by UA result Culture not indicated by UA result      WBC 0-4 0 - 4 /hpf    RBC 0-5 0 - 5 /hpf    Bacteria Negative Negative /hpf   METABOLIC PANEL, BASIC    Collection Time: 10/13/21 10:03 AM   Result Value Ref Range    Sodium 148 (H) 136 - 145 mmol/L    Potassium 3.7 3.5 - 5.1 mmol/L    Chloride 113 (H) 97 - 108 mmol/L    CO2 32 21 - 32 mmol/L    Anion gap 3 (L) 5 - 15 mmol/L    Glucose 98 65 - 100 mg/dL    BUN 13 6 - 20 mg/dL    Creatinine 1.57 (H) 0.55 - 1.02 mg/dL    BUN/Creatinine ratio 8 (L) 12 - 20      GFR est AA 40 (L) >60 ml/min/1.73m2    GFR est non-AA 33 (L) >60 ml/min/1.73m2    Calcium 10.8 (H) 8.5 - 10.1 mg/dL   CBC W/O DIFF    Collection Time: 10/13/21 10:03 AM   Result Value Ref Range    WBC 7.6 3.6 - 11.0 K/uL    RBC 3.53 (L) 3.80 - 5.20 M/uL    HGB 11.1 (L) 11.5 - 16.0 g/dL    HCT 36.8 35.0 - 47.0 %    .2 (H) 80.0 - 99.0 FL    MCH 31.4 26.0 - 34.0 PG    MCHC 30.2 30.0 - 36.5 g/dL    RDW 12.6 11.5 - 14.5 %    PLATELET 134 123 - 224 K/uL    MPV 11.3 8.9 - 12.9 FL    NRBC 0.0 0.0  WBC    ABSOLUTE NRBC 0.00 0.00 - 0.01 K/uL   LITHIUM    Collection Time: 10/13/21 10:03 AM   Result Value Ref Range    Lithium level 1.60 (H) 0.60 - 1.20 mmol/L    Reported dose date Not provided      Reported dose: Not provided Units        CT HEAD WO CONT   Final Result   1. No acute intracranial findings. XR CHEST PORT   Final Result           PHYSICAL EXAM:    Physical Exam  Vitals reviewed. Constitutional:       Appearance: She is ill-appearing. HENT:      Head: Normocephalic and atraumatic. Mouth/Throat:      Mouth: Mucous membranes are dry. Eyes:      Conjunctiva/sclera: Conjunctivae normal.   Cardiovascular:      Rate and Rhythm: Regular rhythm. Heart sounds: Normal heart sounds. Pulmonary:      Effort: Pulmonary effort is normal.      Breath sounds: Normal breath sounds. Abdominal:      General: Abdomen is flat. Bowel sounds are normal.      Palpations: Abdomen is soft. Musculoskeletal:         General: Normal range of motion. Cervical back: Normal range of motion and neck supple.       Comments: Left posterior ankle pain near the insertion for the Achilles   Neurological:      General: No focal deficit present. Mental Status: She is alert and oriented to person, place, and time. Mental status is at baseline. Psychiatric:         Mood and Affect: Mood normal.          Data Review    Recent Results (from the past 24 hour(s))   EKG, 12 LEAD, INITIAL    Collection Time: 10/12/21 11:43 AM   Result Value Ref Range    Ventricular Rate 74 BPM    Atrial Rate 74 BPM    P-R Interval 214 ms    QRS Duration 94 ms    Q-T Interval 372 ms    QTC Calculation (Bezet) 412 ms    Calculated P Axis 76 degrees    Calculated R Axis 137 degrees    Calculated T Axis 49 degrees    Diagnosis       Sinus rhythm with 1st degree A-V block  Biatrial enlargement  Right axis deviation  Pulmonary disease pattern  Incomplete right bundle branch block  Nonspecific T wave abnormality  Abnormal ECG  When compared with ECG of 03-OCT-2016 11:40,  Incomplete right bundle branch block is now Present  T wave inversion now evident in Lateral leads  QT has shortened  Confirmed by Esha Decker ([id number]) on 10/12/2021 1:43:59 PM     CBC WITH AUTOMATED DIFF    Collection Time: 10/12/21 11:50 AM   Result Value Ref Range    WBC 8.7 3.6 - 11.0 K/uL    RBC 3.77 (L) 3.80 - 5.20 M/uL    HGB 12.1 11.5 - 16.0 g/dL    HCT 37.8 35.0 - 47.0 %    .3 (H) 80.0 - 99.0 FL    MCH 32.1 26.0 - 34.0 PG    MCHC 32.0 30.0 - 36.5 g/dL    RDW 12.5 11.5 - 14.5 %    PLATELET 513 437 - 300 K/uL    MPV 12.2 8.9 - 12.9 FL    NRBC 0.0 0.0  WBC    ABSOLUTE NRBC 0.00 0.00 - 0.01 K/uL    NEUTROPHILS 79 (H) 32 - 75 %    LYMPHOCYTES 12 12 - 49 %    MONOCYTES 7 5 - 13 %    EOSINOPHILS 2 0 - 7 %    BASOPHILS 0 0 - 1 %    IMMATURE GRANULOCYTES 0 0 - 0.5 %    ABS. NEUTROPHILS 6.9 1.8 - 8.0 K/UL    ABS. LYMPHOCYTES 1.0 0.8 - 3.5 K/UL    ABS. MONOCYTES 0.6 0.0 - 1.0 K/UL    ABS. EOSINOPHILS 0.2 0.0 - 0.4 K/UL    ABS. BASOPHILS 0.0 0.0 - 0.1 K/UL    ABS. IMM.  GRANS. 0.0 0.00 - 0.04 K/UL    DF AUTOMATED     TROPONIN-HIGH SENSITIVITY    Collection Time: 10/12/21 11:50 AM   Result Value Ref Range    Troponin-High Sensitivity 21 0 - 51 ng/L   TSH 3RD GENERATION    Collection Time: 10/12/21 11:50 AM   Result Value Ref Range    TSH 4.96 (H) 0.36 - 5.22 uIU/mL   METABOLIC PANEL, COMPREHENSIVE    Collection Time: 10/12/21  1:00 PM   Result Value Ref Range    Sodium 144 136 - 145 mmol/L    Potassium 3.7 3.5 - 5.1 mmol/L    Chloride 113 (H) 97 - 108 mmol/L    CO2 26 21 - 32 mmol/L    Anion gap 5 5 - 15 mmol/L    Glucose 80 65 - 100 mg/dL    BUN 14 6 - 20 mg/dL    Creatinine 1.34 (H) 0.55 - 1.02 mg/dL    BUN/Creatinine ratio 10 (L) 12 - 20      GFR est AA 48 (L) >60 ml/min/1.73m2    GFR est non-AA 40 (L) >60 ml/min/1.73m2    Calcium 9.8 8.5 - 10.1 mg/dL    Bilirubin, total 0.7 0.2 - 1.0 mg/dL    AST (SGOT) 62 (H) 15 - 37 U/L    ALT (SGPT) 48 12 - 78 U/L    Alk. phosphatase 83 45 - 117 U/L    Protein, total 6.0 (L) 6.4 - 8.2 g/dL    Albumin 3.3 (L) 3.5 - 5.0 g/dL    Globulin 2.7 2.0 - 4.0 g/dL    A-G Ratio 1.2 1.1 - 2.2     TROPONIN-HIGH SENSITIVITY    Collection Time: 10/12/21  2:35 PM   Result Value Ref Range    Troponin-High Sensitivity 26 0 - 51 ng/L   NT-PRO BNP    Collection Time: 10/12/21  2:35 PM   Result Value Ref Range    NT pro-BNP 56 <125 pg/mL   CBC WITH AUTOMATED DIFF    Collection Time: 10/12/21  7:45 PM   Result Value Ref Range    WBC 8.5 3.6 - 11.0 K/uL    RBC 4.12 3.80 - 5.20 M/uL    HGB 12.9 11.5 - 16.0 g/dL    HCT 42.2 35.0 - 47.0 %    .4 (H) 80.0 - 99.0 FL    MCH 31.3 26.0 - 34.0 PG    MCHC 30.6 30.0 - 36.5 g/dL    RDW 12.4 11.5 - 14.5 %    PLATELET 836 065 - 259 K/uL    MPV 11.0 8.9 - 12.9 FL    NRBC 0.0 0.0  WBC    ABSOLUTE NRBC 0.00 0.00 - 0.01 K/uL    NEUTROPHILS 79 (H) 32 - 75 %    LYMPHOCYTES 11 (L) 12 - 49 %    MONOCYTES 7 5 - 13 %    EOSINOPHILS 3 0 - 7 %    BASOPHILS 0 0 - 1 %    IMMATURE GRANULOCYTES 0 0 - 0.5 %    ABS. NEUTROPHILS 6.7 1.8 - 8.0 K/UL    ABS. LYMPHOCYTES 0.9 0.8 - 3.5 K/UL    ABS. MONOCYTES 0.6 0.0 - 1.0 K/UL    ABS.  EOSINOPHILS 0.3 0.0 - 0.4 K/UL    ABS. BASOPHILS 0.0 0.0 - 0.1 K/UL    ABS. IMM. GRANS. 0.0 0.00 - 0.04 K/UL    DF AUTOMATED     METABOLIC PANEL, COMPREHENSIVE    Collection Time: 10/12/21  7:45 PM   Result Value Ref Range    Sodium 143 136 - 145 mmol/L    Potassium 4.6 3.5 - 5.1 mmol/L    Chloride 109 (H) 97 - 108 mmol/L    CO2 32 21 - 32 mmol/L    Anion gap 2 (L) 5 - 15 mmol/L    Glucose 81 65 - 100 mg/dL    BUN 14 6 - 20 mg/dL    Creatinine 1.79 (H) 0.55 - 1.02 mg/dL    BUN/Creatinine ratio 8 (L) 12 - 20      GFR est AA 35 (L) >60 ml/min/1.73m2    GFR est non-AA 29 (L) >60 ml/min/1.73m2    Calcium 12.0 (H) 8.5 - 10.1 mg/dL    Bilirubin, total 0.8 0.2 - 1.0 mg/dL    AST (SGOT) 69 (H) 15 - 37 U/L    ALT (SGPT) 65 12 - 78 U/L    Alk.  phosphatase 105 45 - 117 U/L    Protein, total 7.5 6.4 - 8.2 g/dL    Albumin 4.2 3.5 - 5.0 g/dL    Globulin 3.3 2.0 - 4.0 g/dL    A-G Ratio 1.3 1.1 - 2.2     TROPONIN-HIGH SENSITIVITY    Collection Time: 10/12/21  7:45 PM   Result Value Ref Range    Troponin-High Sensitivity 22 0 - 51 ng/L   AMMONIA    Collection Time: 10/12/21  9:12 PM   Result Value Ref Range    Ammonia 14 <32 umol/L   LITHIUM    Collection Time: 10/12/21 10:11 PM   Result Value Ref Range    Lithium level 2.09 (HH) 0.60 - 1.20 mmol/L    Reported dose date Dose Dependent      Reported dose: Dose Dependent Units   URINALYSIS W/ REFLEX CULTURE    Collection Time: 10/12/21 10:15 PM    Specimen: Urine   Result Value Ref Range    Color Yellow/Straw      Appearance Clear Clear      Specific gravity <1.005 1.003 - 1.030    pH (UA) 7.0 5.0 - 8.0      Protein Negative Negative mg/dL    Glucose Negative Negative mg/dL    Ketone Negative Negative mg/dL    Bilirubin Negative Negative      Blood Negative Negative      Urobilinogen 0.1 0.1 - 1.0 EU/dL    Nitrites Negative Negative      Leukocyte Esterase Negative Negative      UA:UC IF INDICATED Culture not indicated by UA result Culture not indicated by UA result      WBC 0-4 0 - 4 /hpf    RBC 0-5 0 - 5 /hpf    Bacteria Negative Negative /hpf   METABOLIC PANEL, BASIC    Collection Time: 10/13/21 10:03 AM   Result Value Ref Range    Sodium 148 (H) 136 - 145 mmol/L    Potassium 3.7 3.5 - 5.1 mmol/L    Chloride 113 (H) 97 - 108 mmol/L    CO2 32 21 - 32 mmol/L    Anion gap 3 (L) 5 - 15 mmol/L    Glucose 98 65 - 100 mg/dL    BUN 13 6 - 20 mg/dL    Creatinine 1.57 (H) 0.55 - 1.02 mg/dL    BUN/Creatinine ratio 8 (L) 12 - 20      GFR est AA 40 (L) >60 ml/min/1.73m2    GFR est non-AA 33 (L) >60 ml/min/1.73m2    Calcium 10.8 (H) 8.5 - 10.1 mg/dL   CBC W/O DIFF    Collection Time: 10/13/21 10:03 AM   Result Value Ref Range    WBC 7.6 3.6 - 11.0 K/uL    RBC 3.53 (L) 3.80 - 5.20 M/uL    HGB 11.1 (L) 11.5 - 16.0 g/dL    HCT 36.8 35.0 - 47.0 %    .2 (H) 80.0 - 99.0 FL    MCH 31.4 26.0 - 34.0 PG    MCHC 30.2 30.0 - 36.5 g/dL    RDW 12.6 11.5 - 14.5 %    PLATELET 088 922 - 897 K/uL    MPV 11.3 8.9 - 12.9 FL    NRBC 0.0 0.0  WBC    ABSOLUTE NRBC 0.00 0.00 - 0.01 K/uL   LITHIUM    Collection Time: 10/13/21 10:03 AM   Result Value Ref Range    Lithium level 1.60 (H) 0.60 - 1.20 mmol/L    Reported dose date Not provided      Reported dose: Not provided Units        Assessment/Plan:     Active Problems:    Generalized weakness (10/12/2021)      Acute kidney injury superimposed on CKD ([facility] [address] (10/12/2021)      Hospital course: This is a 62-year-old female admitted on 10/12/2021 secondary to generalized weakness and multiple ground-level falls. Patient also had decreased mentation. She was found to have lithium toxicity with elevated lithium levels as a treatment for her schizophrenia. .  Lithium has been held. Lithium levels are trending down but remains elevated. PT OT evaluation pending. Patient also with RAO and receiving IV fluids. Will restart lithium when appropriate at decreased dosages.     Lithium toxicity with multiple ground-level fall  Bradycardia resolved  Holding lithium to lithium levels normal  Elevated lithium levels  CT head without intracranial process  Urinalysis normal  PT/OT evaluation     Acute kidney injury on CKD stage III  Continue IV fluids  Repeat serial laboratory data     Hypertension  -Amlodipine     Schizophrenia  -Continue Zyprexa. Lithium is on hold    Left ankle pain  X-ray left ankle     DVT prophylaxis: Lovenox  Ulcer prophylaxis: Not indicated tolerating p.o. diet    Discharge barriers: Improvement lithium levels. Ambulating without ataxia. Attempted to update Savana Guerrero, [phone]       Care Plan discussed with: Patient/Family    Total time spent with patient: >35 minutes.

## 2021-10-13 NOTE — ED NOTES
.. TRANSFER - OUT REPORT:    Verbal report given to ZHAO Clarke on [employer]  being transferred to [facility] on 4E for routine progression of care       Report consisted of patients Situation, Background, Assessment and   Recommendations(SBAR). Information from the following report(s) SBAR, ED Summary and MAR was reviewed with the receiving nurse. Lines:       Opportunity for questions and clarification was provided.       Patient transported with:   [employer]

## 2021-10-13 NOTE — PROGRESS NOTES
Patient arrived via stretcher X 1 nurse from ER. Alert and verbal with no skin issues noted during this assessment; IV site patent and infusing and purewick in place.

## 2021-10-14 VITALS
HEIGHT: 67 IN | RESPIRATION RATE: 20 BRPM | TEMPERATURE: 98.1 F | SYSTOLIC BLOOD PRESSURE: 128 MMHG | DIASTOLIC BLOOD PRESSURE: 68 MMHG | WEIGHT: 178 LBS | OXYGEN SATURATION: 97 % | BODY MASS INDEX: 27.94 KG/M2 | HEART RATE: 76 BPM

## 2021-10-14 PROBLEM — T56.891A LITHIUM TOXICITY: Status: ACTIVE | Noted: 2021-10-14

## 2021-10-14 LAB
ALBUMIN SERPL-MCNC: 3.3 G/DL (ref 3.5–5)
ALBUMIN/GLOB SERPL: 1.2 {RATIO} (ref 1.1–2.2)
ALP SERPL-CCNC: 93 U/L (ref 45–117)
ALT SERPL-CCNC: 45 U/L (ref 12–78)
ANION GAP SERPL CALC-SCNC: 3 MMOL/L (ref 5–15)
AST SERPL W P-5'-P-CCNC: 29 U/L (ref 15–37)
BASOPHILS # BLD: 0 K/UL (ref 0–0.1)
BASOPHILS NFR BLD: 0 % (ref 0–1)
BILIRUB SERPL-MCNC: 0.5 MG/DL (ref 0.2–1)
BUN SERPL-MCNC: 12 MG/DL (ref 6–20)
BUN/CREAT SERPL: 9 (ref 12–20)
CA-I BLD-MCNC: 11 MG/DL (ref 8.5–10.1)
CHLORIDE SERPL-SCNC: 120 MMOL/L (ref 97–108)
CO2 SERPL-SCNC: 27 MMOL/L (ref 21–32)
CREAT SERPL-MCNC: 1.38 MG/DL (ref 0.55–1.02)
DATE LAST DOSE: NORMAL
DIFFERENTIAL METHOD BLD: ABNORMAL
EOSINOPHIL # BLD: 0.3 K/UL (ref 0–0.4)
EOSINOPHIL NFR BLD: 4 % (ref 0–7)
ERYTHROCYTE [DISTWIDTH] IN BLOOD BY AUTOMATED COUNT: 12.6 % (ref 11.5–14.5)
GLOBULIN SER CALC-MCNC: 2.7 G/DL (ref 2–4)
GLUCOSE SERPL-MCNC: 85 MG/DL (ref 65–100)
HCT VFR BLD AUTO: 37.3 % (ref 35–47)
HGB BLD-MCNC: 11.4 G/DL (ref 11.5–16)
IMM GRANULOCYTES # BLD AUTO: 0 K/UL (ref 0–0.04)
IMM GRANULOCYTES NFR BLD AUTO: 0 % (ref 0–0.5)
LITHIUM SERPL-SCNC: 1.2 MMOL/L (ref 0.6–1.2)
LYMPHOCYTES # BLD: 1.3 K/UL (ref 0.8–3.5)
LYMPHOCYTES NFR BLD: 19 % (ref 12–49)
MCH RBC QN AUTO: 31.9 PG (ref 26–34)
MCHC RBC AUTO-ENTMCNC: 30.6 G/DL (ref 30–36.5)
MCV RBC AUTO: 104.5 FL (ref 80–99)
MONOCYTES # BLD: 0.5 K/UL (ref 0–1)
MONOCYTES NFR BLD: 7 % (ref 5–13)
NEUTS SEG # BLD: 4.7 K/UL (ref 1.8–8)
NEUTS SEG NFR BLD: 70 % (ref 32–75)
NRBC # BLD: 0 K/UL (ref 0–0.01)
NRBC BLD-RTO: 0 PER 100 WBC
PLATELET # BLD AUTO: 232 K/UL (ref 150–400)
PMV BLD AUTO: 11.8 FL (ref 8.9–12.9)
POTASSIUM SERPL-SCNC: 3.8 MMOL/L (ref 3.5–5.1)
PROT SERPL-MCNC: 6 G/DL (ref 6.4–8.2)
RBC # BLD AUTO: 3.57 M/UL (ref 3.8–5.2)
REPORTED DOSE,DOSE: NORMAL UNITS
SODIUM SERPL-SCNC: 150 MMOL/L (ref 136–145)
WBC # BLD AUTO: 6.8 K/UL (ref 3.6–11)

## 2021-10-14 PROCEDURE — 97530 THERAPEUTIC ACTIVITIES: CPT

## 2021-10-14 PROCEDURE — 97161 PT EVAL LOW COMPLEX 20 MIN: CPT

## 2021-10-14 PROCEDURE — 80053 COMPREHEN METABOLIC PANEL: CPT

## 2021-10-14 PROCEDURE — 85025 COMPLETE CBC W/AUTO DIFF WBC: CPT

## 2021-10-14 PROCEDURE — 74011250636 HC RX REV CODE- 250/636: Performed by: INTERNAL MEDICINE

## 2021-10-14 PROCEDURE — 97165 OT EVAL LOW COMPLEX 30 MIN: CPT

## 2021-10-14 PROCEDURE — 80178 ASSAY OF LITHIUM: CPT

## 2021-10-14 PROCEDURE — 36415 COLL VENOUS BLD VENIPUNCTURE: CPT

## 2021-10-14 PROCEDURE — 74011250637 HC RX REV CODE- 250/637: Performed by: INTERNAL MEDICINE

## 2021-10-14 RX ORDER — CINACALCET 30 MG/1
30 TABLET, FILM COATED ORAL DAILY
COMMUNITY

## 2021-10-14 RX ORDER — LITHIUM CARBONATE 150 MG/1
150 CAPSULE ORAL 3 TIMES DAILY
Qty: 60 CAPSULE | Refills: 0 | Status: SHIPPED | OUTPATIENT
Start: 2021-10-14 | End: 2021-12-29

## 2021-10-14 RX ADMIN — AMLODIPINE BESYLATE 5 MG: 5 TABLET ORAL at 09:57

## 2021-10-14 RX ADMIN — Medication 10 ML: at 05:10

## 2021-10-14 RX ADMIN — ENOXAPARIN SODIUM 40 MG: 40 INJECTION SUBCUTANEOUS at 09:57

## 2021-10-14 RX ADMIN — ACETAMINOPHEN 650 MG: 325 TABLET ORAL at 09:57

## 2021-10-14 NOTE — PROGRESS NOTES
Reason for Readmission:   Generalized weakness, RAO           RUR Score/Risk Level:  13%       PCP: First and Last name:  Andre Alvarez MD   Name of Practice:    Are you a current patient: Yes/No: Yes   Approximate date of last visit: A few months ago   Can you participate in a virtual visit with your PCP:     Is a Care Conference indicated:      Did you attend your follow up appointment (s): If not, why not: Came to ED twice in same day. Resources/supports as identified by patient/family:  Sister-in-law, spouse, brother        Top Challenges facing patient (as identified by patient/family and CM): Finances/Medication cost?  No concerns     Transportation   Patient does not drive, spouse provides transportation     Support system or lack thereof? Sister-in-law, spouse, brother       Living arrangements? Lives with sister-in-law        Self-care/ADLs/Cognition? Independent in ADL's          Current Advanced Directive/Advance Care Plan:  Full code           Plan for utilizing home health:  Unsure at this time, waiting for therapy recommendations. Transition of Care Plan:    Based on readmission, the patient's previous Plan of Care has been evaluated and/or modified. The current Transition of Care Plan is:   CM met with patient and spouse at bedside to complete assessment. Patient lives with sister-in-law. DME: walker, uses sometimes. Independent in ADL's. Patient does not drive, spouse provides transportation. CM explained HH, IRF, SNF to spouse. Spouse wants to wait for therapy recommendations before making a decision. CM updated contact information on chart.     Primary decision maker: Ying Tucker (spouse)- [phone]  Secondary decision maker: Ying Tucker (son)- [phone]

## 2021-10-14 NOTE — PROGRESS NOTES
Pt Cardiac rhythm remains Sinus izzy with a pulse currently at 39. Pulse previously stating 40 @2000. Dr. Luz Galvan was paged regarding concerns of dropping pulse rate. Provider advised this is more than likely coming from Lithium toxicity which Lithium 300mg tablet remains held. Lithium lab draw scheduled to be draw this morning. Parameters per MD set as following: Less than 30 pulse rate and symptomatic will require further need of tx.

## 2021-10-14 NOTE — DISCHARGE SUMMARY
Admit date: 10/12/2021   Admitting Provider: Deja Garcia MD    Discharge date: 10/14/2021  Discharging Provider: Mary Knox PA-C      * Admission Diagnoses: Acute kidney injury superimposed on CKD ([facility] [N17.9, N18.9]  Generalized weakness [R53.1]    * Discharge Diagnoses:    Hospital Problems as of 10/14/2021 Date Reviewed: 9/9/2021        Codes Class Noted - Resolved POA    Lithium toxicity ICD-10-CM: Z66.316P  ICD-9-CM: 985.8, E980.9  10/14/2021 - Present Unknown        Generalized weakness ICD-10-CM: R53.1  ICD-9-CM: 780.79  10/12/2021 - Present Unknown        Acute kidney injury superimposed on CKD ([facility] ICD-10-CM: N17.9, N18.9  ICD-9-CM: 866.00, 585.9  10/12/2021 - Present Unknown              * Hospital Course: This is a 30-year-old female admitted on 10/12/2021 secondary to generalized weakness and multiple ground-level falls. Patient also had decreased mentation. She was found to have lithium toxicity with elevated lithium levels as a treatment for her schizophrenia. .  Lithium has been held. Lithium levels are trending down and now normal.  Restarting lithium at 150 mg 3 times daily instead of 300 mg twice daily. Patient will discontinue Mobic. PT OT evaluation recommeding ongoing physical therapy and Occupational Therapy although patient denies SNF. Salina Ware Patient also with RAO and receiving IV fluids with return of creatinine to normal baseline, 1.3. Discuss discharge instructions with the patient's  Archie Osorio, son Amina Montalvo and the patient. Prescription provided for new lithium dosage. Patient will follow up with Dr. Yolanda Molina, psychiatrist in 1 week for repeat lithium levels. Patient will follow up with primary nephrologist, Dr. Orestes Estevez in the next 2 weeks. .Follow up with PCP or new PCP, Dr. Yeni Doty. Left ankle x-ray with no acute issue.  Follow up with orthopedic as needed.     Lithium toxicity with multiple ground-level fall  Bradycardia resolved  Restarting lithium at 150 mg 3 times a day  Elevated lithium levels, peaked at 2.09  CT head without intracranial process  Urinalysis normal  PT/OT evaluation recommending physical therapy at home     Acute kidney injury on CKD stage III  Return to baseline for creatinine levels  Follow-up with Dr. Fidelina Pratt at the end of the month as previously scheduled     Hypertension  -Amlodipine     Schizophrenia  -Continue Zyprexa.  Lithium new prescription provided       * Procedures:   * No surgery found *      Consults:   None    Significant Diagnostic Studies: As discussed in hospital course    Discharge Exam:  Visit Vitals  /68   Pulse 76   Temp 98.1 °F (36.7 °C)   Resp 20   Ht 5' 7\" (1.702 m)   Wt 80.7 kg (178 lb)   SpO2 97%   Breastfeeding No   BMI 27.88 kg/m²     PHYSICAL EXAM:  Constitutional: Alert in no acute distress   HEENT: Sclerae anicteric, The neck is supple. Cardiovascular: Regular rate and rhythm. No murmurs, gallops, or rubs. Marco [place] Respiratory: Clear breath sounds with no wheezes, rales, or rhonchi. GI: Abdomen nondistended, soft, and nontender. Normal active bowel sounds. Rectal: Deferred   Musculoskeletal: No pitting edema of the lower legs. Extremities have good range of motion. Neurological:  Patient is alert and oriented. Cranial nerves II-XII intact  Psychiatric: Mood appears appropriate with judgement intact. Lymphatic: No cervical or supraclavicular adenopathy. Skin: No rashes or breakdown of the skin      * Discharge Condition: stable  * Disposition: Home with home health    Discharge Medications:  Current Discharge Medication List      START taking these medications    Details   lithium carbonate 150 mg capsule Take 1 Capsule by mouth three (3) times daily. Qty: 60 Capsule, Refills: 0  Start date: 10/14/2021         CONTINUE these medications which have NOT CHANGED    Details   cinacalcet (SENSIPAR) 30 mg tablet Take 30 mg by mouth daily. amLODIPine (NORVASC) 5 mg tablet Take 10 mg by mouth daily. OLANZapine (ZYPREXA) 20 mg tablet Take 10 mg by mouth nightly. Associated Diagnoses: Senile osteoporosis; Hypercalcemia; Multinodular goiter         STOP taking these medications       ibuprofen (MOTRIN) 600 mg tablet Comments:   Reason for Stopping:         imipramine (TOFRANIL) 25 mg tablet Comments:   Reason for Stopping:         lithium carbonate 300 mg tablet Comments:   Reason for Stopping:               * Follow-up Care/Patient Instructions:   Activity: Activity as tolerated  Diet: Regular Diet  Wound Care: None needed    Follow-up Information     Follow up With Specialties Details Why Contact Info      In 1 week      Madi Raphael MD Nephrology On 10/28/2021 2:30 pm office appointment [facility]  [phone]      Bijal Elliott MD Psychiatry, Addiction Medicine, [address] [place] [address] On 10/20/2021 2:10 pm office appointment [address] [place] [address]  [phone]      Shahana Elmore MD Family Medicine In 1 week  [address] [place] [address]  [phone]            Discharge summary greater than 35 minutes spent with the patient performing discharge instructions, medication review and physical exam    Signed:  Carlos Hinojosa PA-C  10/14/2021  11:41 AM

## 2021-10-14 NOTE — PROGRESS NOTES
I have reviewed discharge instructions, follow up appointments, and medications (patient was provided with paper script in discharge paperwork). Patient verbalized understanding. Patient escorted via wheelchair with all belongings to front entrance with family.

## 2021-10-14 NOTE — PROGRESS NOTES
Problem: Pressure Injury - Risk of  Goal: *Prevention of pressure injury  Description: Document Jeyson Scale and appropriate interventions in the flowsheet. Outcome: Progressing Towards Goal  Note: Pressure Injury Interventions:  Sensory Interventions: Assess changes in LOC, Minimize linen layers    Moisture Interventions: Absorbent underpads, Apply protective barrier, creams and emollients, Minimize layers    Activity Interventions: PT/OT evaluation    Mobility Interventions: Assess need for specialty bed    Nutrition Interventions: Document food/fluid/supplement intake    Friction and Shear Interventions: Apply protective barrier, creams and emollients                Problem: Falls - Risk of  Goal: *Absence of Falls  Description: Document Wilber Fall Risk and appropriate interventions in the flowsheet.   Outcome: Progressing Towards Goal  Note: Fall Risk Interventions:  Mobility Interventions: Patient to call before getting OOB         Medication Interventions: Patient to call before getting OOB    Elimination Interventions: Call light in reach    History of Falls Interventions: Vital signs minimum Q4HRs X 24 hrs (comment for end date)

## 2021-10-14 NOTE — PROGRESS NOTES
Problem: Self Care Deficits Care Plan (Adult)  Goal: *Acute Goals and Plan of Care (Insert Text)  Description: Pt will be MI sup<->sit in prep for EOB ADL's  Pt will be MI  LB dressing EOB level  Pt will be MI  grooming EOB level  Pt will be MI  sit<-> prep for toilet transfer  Pt will be MI  toilet transfer with LRAD  Pt will be MI  toileting/cloth mgmt LRAD  Pt will be MI  grooming standing sink  Pt will be MI bathing sitting/standing sink LRAD  Pt will be MI braxton UE HEP in prep for self care tasks      Outcome: Not Met     OCCUPATIONAL THERAPY EVALUATION  Patient: Shania Ingram (61 y.o. female)  Date: 10/14/2021  Primary Diagnosis: Acute kidney injury superimposed on CKD ([facility] [N17.9, N18.9]  Generalized weakness [R53.1]        Precautions: Falls       ASSESSMENT  Patient is 60 y/o female came to [facility] with generalized weakness, multiple falls over past 1 week (seen in ED earlier in day and d/c home) after which brother brought pt back to ED with increaed lethargy and adm 10/12/2021 for generalized weakness, RAO on CKD stage III, HTN, schizophrenia, bradycardic, lithium toxicity. Pt has hx of HTN, schizophrenia, colonoscopy. Pt received semi supine in bed A&O to self, place and time and agreeable for OT/PT eval. Per pt report, pt lives with brother (helps care for brother) in first floor apartment with 2 steps braxton rails to enter and is MI for self care (sitting at sink at times) and MI functional transfers/mobility (using cane, RW and rollator as needed per pt report). Pt initially reporting no falls however later on reporting 2 falls sliding out of bed.      Pt currently presents with decreased balance, decreased activity tolerance, decreased safety awareness, generalized weakness and increased need for assist with self care (SBA simple grooming seated in chair at sink, SBA UB bathing seated in chair at sink, min A LB/kaya area hygiene during stance, min A UB dressing 2/2 line management) and functional transfers/mobility (min A sup->sit increased time, min A scooting EOB, min A sit<->stand and toilet transfer with Rw and gait belt with cueing for safe hand placement). Pt would benefit from skilled OT services while at [facility] in order to increase safety and independence with self care and functional transfers/mobility. Recommend discharge to SNF when medically appropriate. Other factors to consider for discharge: time since onset, severity of deficits, PLOF        PLAN :  Recommendations and Planned Interventions: self care training, functional mobility training, therapeutic exercise, balance training, therapeutic activities, endurance activities, patient education, and home safety training    Frequency/Duration: Patient will be followed by occupational therapy 3-5x/week to address goals. Recommendation for discharge: (in order for the patient to meet his/her long term goals)  Damian Steiner    This discharge recommendation:  Has been made in collaboration with the attending provider and/or case management    IF patient discharges home will need the following DME: TBD       SUBJECTIVE:   Patient stated can I wash up? Eve Ordonez    OBJECTIVE DATA SUMMARY:   HISTORY:   Past Medical History:   Diagnosis Date    Hypertension     Psychotic disorder ([facility]     Schizophrenia     Past Surgical History:   Procedure Laterality Date    HX COLONOSCOPY      HX OTHER SURGICAL      Ingrown toenail removal       Expanded or extensive additional review of patient history:     Home Situation  Home Environment: Apartment  # Steps to Enter: 2  Rails to Enter: Yes  Hand Rails : Left  One/Two Story Residence: One story  # of Interior Steps:  (3)  Living Alone: No  Support Systems: Other Family Member(s) (brother)  Patient Expects to be Discharged to[de-identified] Long-term care  Current DME Used/Available at Home: Cane, quad, Walker, rollator, Walker, rolling    PLOF: Pt MI for ADLS/IADLS, MI with mobility prior to admission. EXAMINATION OF PERFORMANCE DEFICITS:  Cognitive/Behavioral Status:  Neurologic State: Alert  Orientation Level: Oriented to person;Oriented to place;Oriented to time     Hearing: Auditory  Auditory Impairment: None    Range of Motion:  AROM: Generally decreased, functional (braxton shoulder ROm to aprox 100 degrees)  PROM: Generally decreased, functional     Strength:  Strength: Generally decreased, functional (grossly observed to be 3+/5)     Balance:  Sitting: Impaired; Without support  Sitting - Static: Good (unsupported)  Sitting - Dynamic: Fair (occasional)  Standing: Impaired;Pull to stand; With support  Standing - Static: Constant support; Fair  Standing - Dynamic : Constant support; Fair    Functional Mobility and Transfers for ADLs:  Bed Mobility:  Supine to Sit: Minimum assistance  Scooting: Minimum assistance    Transfers:  Sit to Stand: Minimum assistance  Stand to Sit: Minimum assistance  Bed to Chair: Minimum assistance  Bathroom Mobility: Minimum assistance  Toilet Transfer : Minimum assistance  Assistive Device : Gait Belt;Walker, rolling    ADL Assessment:     Oral Facial Hygiene/Grooming: Stand-by assistance    Bathing:  (SBA UB, min A kaya area)    Upper Body Dressing: Minimum assistance    Toileting: Minimum assistance     ADL Intervention and task modifications:     Grooming  Grooming Assistance: Stand-by assistance  Position Performed: Seated in chair (seated in chair at sink)  Washing Face: Stand-by assistance  Washing Hands: Stand-by assistance  Brushing Teeth: Stand-by assistance  Brushing/Combing Hair: Stand-by assistance    Upper Body Bathing  Bathing Assistance: Stand-by assistance  Position Performed: Seated in chair    Lower Body Bathing  Perineal  : Minimum assistance  Position Performed: Standing    Upper [address]: Minimum  assistance    Toileting  Toileting Assistance: Minimum assistance  Bladder Hygiene: Minimum assistance  Bowel Hygiene: Minimum assistance  Clothing Management: Minimum assistance    [facility] [address] [address] AM-PACTM \"6 Clicks\"                                                       Daily Activity Inpatient Short Form  How much help from another person does the patient currently need. .. Total; A Lot A Little None   1. Putting on and taking off regular lower body clothing? []  1 [x]  2 []  3 []  4   2. Bathing (including washing, rinsing, drying)? []  1 []  2 [x]  3 []  4   3. Toileting, which includes using toilet, bedpan or urinal? [] 1 []  2 [x]  3 []  4   4. Putting on and taking off regular upper body clothing? []  1 []  2 [x]  3 []  4   5. Taking care of personal grooming such as brushing teeth? []  1 []  2 [x]  3 []  4   6. Eating meals? []  1 []  2 []  3 [x]  4   © 2007, Trustees of [address] [place] [address] [address] [address], under license to [employer]. All rights reserved     Score: 18/24     Interpretation of Tool:  Represents clinically-significant functional categories (i.e. Activities of daily living). Percentage of Impairment CH    0%   CI    1-19% CJ    20-39% CK    40-59% CL    60-79% CM    80-99% CN     100%   [facility]  Score 6-24 24 23 20-22 15-19 10-14 7-9 6        Occupational Therapy Evaluation Charge Determination   History Examination Decision-Making   LOW Complexity : Brief history review  MEDIUM Complexity : 3-5 performance deficits relating to physical, cognitive , or psychosocial skils that result in activity limitations and / or participation restrictions MEDIUM Complexity : Patient may present with comorbidities that affect occupational performnce.  Miniml to moderate modification of tasks or assistance (eg, physical or verbal ) with assesment(s) is necessary to enable patient to complete evaluation       Based on the above components, the patient evaluation is determined to be of the following complexity level: LOW   Pain Rating:  No pain reported    Activity Tolerance:   Good and requires rest breaks  Please refer to the flowsheet for vital signs taken during this treatment. After treatment patient left in no apparent distress:    Sitting in chair and Call bell within reach    COMMUNICATION/EDUCATION:   The patients plan of care was discussed with: Physical therapist and Registered nurse. PT/OT sessions occurred together for increased safety of pt and clinician. Home safety education was provided and the patient/caregiver indicated understanding. and Patient/family have participated as able in goal setting and plan of care. This patients plan of care is appropriate for delegation to [facility].     Thank you for this referral.  Dhaval Cam  Time Calculation: 46 mins

## 2021-10-14 NOTE — PROGRESS NOTES
Patient accepted with [facility] date 10/15/21. CM notified patient at bedside of acceptance. CM also called patients spouse, Leatha Venturakins Sr. @ [phone] to notify of discharge and acceptance for [facility]. Discharge plan of care/case management plan validated with provider discharge order.

## 2021-10-14 NOTE — PROGRESS NOTES
PHYSICAL THERAPY EVALUATION  Patient: Danielle Luna (86 y.o. female)  Date: 10/14/2021  Primary Diagnosis: Acute kidney injury superimposed on CKD ([facility] [N17.9, N18.9]  Generalized weakness [R53.1]        Precautions: falls       ASSESSMENT  Pt is a 60 yo female admitted on 10/21/2021 for generalized weakness and multiple falls > 1 week; currently being treated for lithium toxicity and acute kidney injury. PMH: schizophrenia, HTN. Pt A&O x self, place, and time. Per pt report, pt lives with brother (helps care for brother) in first floor apartment with 2 steps braxton rails to enter and is MI for self care (sitting at sink at times) and MI functional transfers/mobility (using cane, RW and rollator as needed per pt report). Pt initially reporting no falls however later on reporting 2 falls sliding out of bed. Based on the objective data described below, the patient presents with generalized weakness, impaired functional mobility, impaired amb, impaired balance, and decreased activity tolerance. Pt semi-supine in bed upon PT arrival, agreeable to evaluation. Pt required min A for bed mobility, min A supine <> sit, min A sit <> stand transfers. Pt amb 25 feet to bathroom with gt belt, RW, and min A; demonstrating very short, slow, step to gt pattern with generalized unsteadiness noted requiring max verbal cues for safety with use of RW. Pt did fair with session today with no c/o pain throughout session but requiring increased assistance and verbal cues for all mobility. Pt will benefit from continued skilled PT to address above deficits and return to PLOF. Current PT DC recommendation SNF.      Current Level of Function Impacting Discharge (mobility/balance): min A with verbal cues for safety    Other factors to consider for discharge: severity of deficits      PLAN :  Recommendations and Planned Interventions: bed mobility training, transfer training, gait training, therapeutic exercises, neuromuscular re-education, patient and family training/education and therapeutic activities      Frequency/Duration: Patient will be followed by physical therapy:  3-5x/week to address goals. Recommendation for discharge: (in order for the patient to meet his/her long term goals)  Damian Steiner    This discharge recommendation:  Has been made in collaboration with the attending provider and/or case management    IF patient discharges home will need the following DME: none         SUBJECTIVE:   Patient stated I help my brother out when i'm home.     OBJECTIVE DATA SUMMARY:   HISTORY:    Past Medical History:   Diagnosis Date    Hypertension     Psychotic disorder ([facility]     Schizophrenia     Past Surgical History:   Procedure Laterality Date    HX COLONOSCOPY      HX OTHER SURGICAL      Ingrown toenail removal       Home Situation  Home Environment: Apartment  # Steps to Enter: 2  Rails to Enter: Yes  Hand Rails : Left  One/Two Story Residence: One story  # of Interior Steps:  (3)  Living Alone: No  Support Systems: Other Family Member(s) (brother)  Patient Expects to be Discharged to[de-identified] Long-term care  Current DME Used/Available at Home: Elva Zane, quad, Walker, rollator, Walker, rolling    EXAMINATION/PRESENTATION/DECISION MAKING:   Critical Behavior:  Neurologic State: Alert  Orientation Level: Oriented to person, Oriented to place, Oriented to time        Hearing:   Auditory  Auditory Impairment: None  Skin:  Intact where visible  Edema: none noted   Range Of Motion:  AROM: Generally decreased, functional (braxton shoulder ROm to aprox 100 degrees)           PROM: Generally decreased, functional           Strength:    Strength: Generally decreased, functional (grossly observed to be 3+/5)                    Tone & Sensation:                                  Coordination:     Vision:      Functional Mobility:  Bed Mobility:     Supine to Sit: Minimum assistance     Scooting: Minimum assistance  Transfers:  Sit to Stand: Minimum assistance  Stand to Sit: Minimum assistance        Bed to Chair: Minimum assistance              Balance:   Sitting: Impaired; Without support  Sitting - Static: Good (unsupported)  Sitting - Dynamic: Fair (occasional)  Standing: Impaired;Pull to stand; With support  Standing - Static: Constant support; Fair  Standing - Dynamic : Constant support; Fair  Ambulation/Gait Training:  Distance (ft): 25 Feet (ft)  Assistive Device: Gait belt;Walker, rolling  Ambulation - Level of Assistance: Minimal assistance     Gait Description (WDL): Exceptions to WDL           Base of Support: Widened     Speed/Sheela: Slow;Shuffled          Therapeutic Exercises:   Not completed this session    Functional Measure:  [facility] AM-PAC 6 Clicks         Basic Mobility Inpatient Short Form  How much difficulty does the patient currently have. .. Unable A Lot A Little None   1. Turning over in bed (including adjusting bedclothes, sheets and blankets)? [] 1   [] 2   [x] 3   [] 4   2. Sitting down on and standing up from a chair with arms ( e.g., wheelchair, bedside commode, etc.)   [] 1   [x] 2   [] 3   [] 4   3. Moving from lying on back to sitting on the side of the bed? [] 1   [] 2   [x] 3   [] 4          How much help from another person does the patient currently need. .. Total A Lot A Little None   4. Moving to and from a bed to a chair (including a wheelchair)? [] 1   [x] 2   [] 3   [] 4   5. Need to walk in hospital room? [] 1   [x] 2   [] 3   [] 4   6. Climbing 3-5 steps with a railing? [] 1   [x] 2   [] 3   [] 4   © 2007, Trustees of [facility], under license to [employer]. All rights reserved     Score:  Initial: 14/24 Most Recent: X (Date: 10/14/21 )   Interpretation of Tool:  Represents activities that are increasingly more difficult (i.e. Bed mobility, Transfers, Gait).   Score 24 23 22-20 19-15 14-10 9-7 6   Modifier CH CI CJ CK CL CM CN         Physical Therapy Evaluation Charge Determination History Examination Presentation Decision-Making   HIGH Complexity :3+ comorbidities / personal factors will impact the outcome/ POC  HIGH Complexity : 4+ Standardized tests and measures addressing body structure, function, activity limitation and / or participation in recreation  LOW Complexity : Stable, uncomplicated  Other outcome measures ampac 6  mod      Based on the above components, the patient evaluation is determined to be of the following complexity level: LOW     Pain Ratin/10 reported     Activity Tolerance:   Fair and requires rest breaks    After treatment patient left in no apparent distress:   sitting at sink with OT performing sink bath and nsg updated. GOALS:    Problem: Mobility Impaired (Adult and Pediatric)  Goal: *Acute Goals and Plan of Care (Insert Text)  Description: Pt will be I with LE HEP in 7 days. Pt will perform bed mobility with mod I in 7 days. Pt will perform transfers with mod I in 7 days. Pt will amb  feet with LRAD safely with mod I in 7 days. Outcome: Not Met       COMMUNICATION/EDUCATION:   The patients plan of care was discussed with: Occupational therapist and Registered nurse. Fall prevention education was provided and the patient/caregiver indicated understanding., Patient/family have participated as able in goal setting and plan of care. , and Patient/family agree to work toward stated goals and plan of care. PT/OT sessions occurred together for increased safety of pt and clinician.        Thank you for this referral.  Johana Barnard, PT, DPT   Time Calculation: 27 mins

## 2021-10-14 NOTE — PROGRESS NOTES
CM met with patient at bedside to discuss DC planning and therapy recommendations for SNF. Patient does not want to go to a facility for rehab, instead wants to go home and agrees with [facility]. No preference of agency, CM sent referrals.

## 2021-10-15 NOTE — PROGRESS NOTES
10/15/21 1000- CM received call from spouse stating that [facility] is not able to service Juan. [facility] [place] when she was discharged yesterday, but instead spouse took her home with him to [address] [place] [address] Nathaniel Godinez, [id number]. He stated that she is going to stay with him for a few weeks before going back to live in [place] with his sister. CM [facility]errals. ----------------------------------------------------------------------------------------------------    10/15/21 1125- Patient accepted with [facility] - --Anticipated Start of Care 10-  CM called spouse at [phone] and left VM with [facility] agency, phone number and anticipated start date.

## 2021-10-18 ENCOUNTER — OFFICE VISIT (OUTPATIENT)
Dept: SURGERY | Age: 66
End: 2021-10-18
Payer: MEDICARE

## 2021-10-18 VITALS
TEMPERATURE: 98.4 F | BODY MASS INDEX: 29.1 KG/M2 | SYSTOLIC BLOOD PRESSURE: 137 MMHG | DIASTOLIC BLOOD PRESSURE: 76 MMHG | RESPIRATION RATE: 16 BRPM | OXYGEN SATURATION: 97 % | HEART RATE: 80 BPM | HEIGHT: 67 IN | WEIGHT: 185.4 LBS

## 2021-10-18 DIAGNOSIS — I73.9 PERIPHERAL VASCULAR DISEASE (HCC): Primary | ICD-10-CM

## 2021-10-18 DIAGNOSIS — R29.898 WEAKNESS OF BOTH LEGS: ICD-10-CM

## 2021-10-18 DIAGNOSIS — I87.303 CHRONIC VENOUS HYPERTENSION INVOLVING BOTH SIDES: ICD-10-CM

## 2021-10-18 PROCEDURE — 3017F COLORECTAL CA SCREEN DOC REV: CPT | Performed by: SURGERY

## 2021-10-18 PROCEDURE — G8752 SYS BP LESS 140: HCPCS | Performed by: SURGERY

## 2021-10-18 PROCEDURE — 99213 OFFICE O/P EST LOW 20 MIN: CPT | Performed by: SURGERY

## 2021-10-18 PROCEDURE — G8510 SCR DEP NEG, NO PLAN REQD: HCPCS | Performed by: SURGERY

## 2021-10-18 PROCEDURE — G8427 DOCREV CUR MEDS BY ELIG CLIN: HCPCS | Performed by: SURGERY

## 2021-10-18 PROCEDURE — G8754 DIAS BP LESS 90: HCPCS | Performed by: SURGERY

## 2021-10-18 PROCEDURE — G8417 CALC BMI ABV UP PARAM F/U: HCPCS | Performed by: SURGERY

## 2021-10-18 PROCEDURE — 1111F DSCHRG MED/CURRENT MED MERGE: CPT | Performed by: SURGERY

## 2021-10-18 NOTE — PROGRESS NOTES
Chief Complaint   Patient presents with   [facility] Follow up for Left Leg      Visit Vitals  /76 (BP 1 Location: Left arm, BP Patient Position: Sitting)   Pulse 80   Temp 98.4 °F (36.9 °C) (Temporal)   Resp 16   Ht 5' 7\" (1.702 m)   Wt 185 lb 6.4 oz (84.1 kg)   SpO2 97%   BMI 29.04 kg/m²     1. Have you been to the ER, urgent care clinic since your last visit? Hospitalized since your last visit? Yes     2. Have you seen or consulted any other health care providers outside of the [address] [place] [address] since your last visit? Include any pap smears or colon screening.  No

## 2021-10-19 ENCOUNTER — TELEPHONE (OUTPATIENT)
Dept: SURGERY | Age: 66
End: 2021-10-19

## 2021-10-19 NOTE — TELEPHONE ENCOUNTER
Spoke with Shell Beebe and let her know that Dr. Leroy Puentes will follow the patient with home health. She is advising that patient's  is very irate and continuing to yell at her. Advised her that I don't have a note to send her at this time, but gave her our fax number for anything she needs signed. She states she is trying to get therapy out to see patient as soon as possible.

## 2021-10-19 NOTE — TELEPHONE ENCOUNTER
Dulce from [facility] called stating that she have a  home health order from [address] [place] [address]. She want to know  will Dr. Andrew Grigsby follow this patient for home health service. Araceli Mooney a call back at [phone].

## 2021-10-23 PROBLEM — I87.303 CHRONIC VENOUS HYPERTENSION INVOLVING BOTH SIDES: Status: ACTIVE | Noted: 2021-10-23

## 2021-10-23 PROBLEM — I73.9 PERIPHERAL VASCULAR DISEASE (HCC): Status: ACTIVE | Noted: 2021-10-23

## 2021-10-23 PROBLEM — R29.898 WEAKNESS OF BOTH LEGS: Status: ACTIVE | Noted: 2021-10-23

## 2021-10-23 NOTE — PROGRESS NOTES
VASCULAR FOLLOW UP      Subjective:   CHIEF COMPLAINTS:  Bilateral leg weakness  PRESENTATION OF ILLNESS:  Ms. Anna Centeno recently hospitalized for lithium toxicity. Currently psychiatrist weaning off her lithium. She is doing much better now. She still complaining of bilateral leg weakness. She was diagnosed with a chronic venous hypertension swelling. I did recommend the compression stocking to be applied. Patient is is also doing poorly with mobility as well. Currently patient is awaiting for home nurse to help with outpatient physical therapy. And it is yet to be arranged. Family is very frustrated. Patient still has generalized weakness both legs. Patient denies any chest pain shortness of breath. Patient has a previous venous duplex ultrasound which did not show deep vein thrombosis. Past Medical History:   Diagnosis Date    Hypertension     Psychotic disorder ([address]     Schizophrenia      Past Surgical History:   Procedure Laterality Date    HX COLONOSCOPY      HX OTHER SURGICAL      Ingrown toenail removal     Family History   Problem Relation Age of Onset    No Known Problems Mother     No Known Problems Father       Social History     Tobacco Use    Smoking status: Current Every Day Smoker     Packs/day: 0.50     Years: 45.00     Pack years: 22.50    Smokeless tobacco: Never Used   Substance Use Topics    Alcohol use: Not Currently       Prior to Admission medications    Medication Sig Start Date End Date Taking? Authorizing Provider   cinacalcet (SENSIPAR) 30 mg tablet Take 30 mg by mouth daily. Yes Provider, Historical   amLODIPine (NORVASC) 5 mg tablet Take 10 mg by mouth daily. Yes Provider, Historical   OLANZapine (ZYPREXA) 20 mg tablet Take 10 mg by mouth nightly. 5/14/17  Yes Provider, Historical   lithium carbonate 150 mg capsule Take 1 Capsule by mouth three (3) times daily.   Patient not taking: Reported on 10/18/2021 10/14/21   Fatou Ramirez PA-C     No Known Allergies     Review of Systems:  I reviewed the rest of organ systems personally and they were negative signed by Dr. Manzano Sites    Objective:     Visit Vitals  /76 (BP 1 Location: Left arm, BP Patient Position: Sitting)   Pulse 80   Temp 98.4 °F (36.9 °C) (Temporal)   Resp 16   Ht 5' 7\" (1.702 m)   Wt 185 lb 6.4 oz (84.1 kg)   SpO2 97%   BMI 29.04 kg/m²     VITAL SIGNS REVIEWED. Physical Exam:  Patient is well-nourished pleasant in conversation is appropriate. Head and neck examination atraumatic, normocephalic. Gaze appropriate. Conversation appropriate. Neck examination shows supple. No mass. No obvious carotid bruit. Chest examination shows lungs are clear bilaterally well-expanded, no crackles or wheezes. Cardiovascular system regular rate, no obvious murmur. Skin warm to touch  and moist, no skin lesions. Abdomen is soft ,not tender or distended bowel sounds present. No palpable mass. Neurological examinations, no focal neuro deficits moving all 4 extremities. Cranial nerves intact. Sensation is intact as well. Hematologic: No obvious bruise or swelling or obvious lymphadenopathy. Psychosocial: Appropriate. Has good effect. Musculoskeletal system: No muscle wasting, appropriate movements upper and lower extremity. Vascular examination: Patient does have mild to moderate swelling noted. Arterial system is excellent with triphasic signal both DP and PT.         Data Review:   Admission on 10/12/2021, Discharged on 10/14/2021   Component Date Value Ref Range Status    Ventricular Rate 10/12/2021 62  BPM Final    Atrial Rate 10/12/2021 62  BPM Final    P-R Interval 10/12/2021 220  ms Final    QRS Duration 10/12/2021 90  ms Final    Q-T Interval 10/12/2021 428  ms Final    QTC Calculation (Bezet) 10/12/2021 434  ms Final    Calculated P Axis 10/12/2021 69  degrees Final    Calculated R Axis 10/12/2021 137  degrees Final    Calculated T Axis 10/12/2021 75  degrees Final    Diagnosis 10/12/2021    Final                    Value:Sinus rhythm with 1st degree A-V block  Right atrial enlargement  Low voltage QRS  Left posterior fascicular block  Nonspecific T wave abnormality  Abnormal ECG  When compared with ECG of 12-OCT-2021 11:43,  No significant change was found  Confirmed by Coralyn Heads ([id number]) on 10/13/2021 2:15:06 PM      WBC 10/12/2021 8.5  3.6 - 11.0 K/uL Final    RBC 10/12/2021 4.12  3.80 - 5.20 M/uL Final    HGB 10/12/2021 12.9  11.5 - 16.0 g/dL Final    HCT 10/12/2021 42.2  35.0 - 47.0 % Final    MCV 10/12/2021 102.4* 80.0 - 99.0 FL Final    MCH 10/12/2021 31.3  26.0 - 34.0 PG Final    MCHC 10/12/2021 30.6  30.0 - 36.5 g/dL Final    RDW 10/12/2021 12.4  11.5 - 14.5 % Final    PLATELET 39/25/8995 884  150 - 400 K/uL Final    MPV 10/12/2021 11.0  8.9 - 12.9 FL Final    NRBC 10/12/2021 0.0  0.0  WBC Final    ABSOLUTE NRBC 10/12/2021 0.00  0.00 - 0.01 K/uL Final    NEUTROPHILS 10/12/2021 79* 32 - 75 % Final    LYMPHOCYTES 10/12/2021 11* 12 - 49 % Final    MONOCYTES 10/12/2021 7  5 - 13 % Final    EOSINOPHILS 10/12/2021 3  0 - 7 % Final    BASOPHILS 10/12/2021 0  0 - 1 % Final    IMMATURE GRANULOCYTES 10/12/2021 0  0 - 0.5 % Final    ABS. NEUTROPHILS 10/12/2021 6.7  1.8 - 8.0 K/UL Final    ABS. LYMPHOCYTES 10/12/2021 0.9  0.8 - 3.5 K/UL Final    ABS. MONOCYTES 10/12/2021 0.6  0.0 - 1.0 K/UL Final    ABS. EOSINOPHILS 10/12/2021 0.3  0.0 - 0.4 K/UL Final    ABS. BASOPHILS 10/12/2021 0.0  0.0 - 0.1 K/UL Final    ABS. IMM. GRANS.  10/12/2021 0.0  0.00 - 0.04 K/UL Final    DF 10/12/2021 AUTOMATED    Final    Sodium 10/12/2021 143  136 - 145 mmol/L Final    Potassium 10/12/2021 4.6  3.5 - 5.1 mmol/L Final    Chloride 10/12/2021 109* 97 - 108 mmol/L Final    CO2 10/12/2021 32  21 - 32 mmol/L Final    Anion gap 10/12/2021 2* 5 - 15 mmol/L Final    Glucose 10/12/2021 81  65 - 100 mg/dL Final    BUN 10/12/2021 14  6 - 20 mg/dL Final    Creatinine 10/12/2021 1.79* 0.55 - 1.02 mg/dL Final    BUN/Creatinine ratio 10/12/2021 8* 12 - 20   Final    GFR est AA 10/12/2021 35* >60 ml/min/1.73m2 Final    GFR est non-AA 10/12/2021 29* >60 ml/min/1.73m2 Final    Calcium 10/12/2021 12.0* 8.5 - 10.1 mg/dL Final    Bilirubin, total 10/12/2021 0.8  0.2 - 1.0 mg/dL Final    AST (SGOT) 10/12/2021 69* 15 - 37 U/L Final    ALT (SGPT) 10/12/2021 65  12 - 78 U/L Final    Alk.  phosphatase 10/12/2021 105  45 - 117 U/L Final    Protein, total 10/12/2021 7.5  6.4 - 8.2 g/dL Final    Albumin 10/12/2021 4.2  3.5 - 5.0 g/dL Final    Globulin 10/12/2021 3.3  2.0 - 4.0 g/dL Final    A-G Ratio 10/12/2021 1.3  1.1 - 2.2   Final    Troponin-High Sensitivity 10/12/2021 22  0 - 51 ng/L Final    Ammonia 10/12/2021 14  <32 umol/L Final    Lithium level 10/12/2021 2.09* 0.60 - 1.20 mmol/L Final    Reported dose date 10/12/2021 Dose Dependent    Final    Reported dose: 10/12/2021 Dose Dependent  Units Final    Color 10/12/2021 Yellow/Straw    Final    Appearance 10/12/2021 Clear  Clear   Final    Specific gravity 10/12/2021 <1.005  1.003 - 1.030 Final    pH (UA) 10/12/2021 7.0  5.0 - 8.0   Final    Protein 10/12/2021 Negative  Negative mg/dL Final    Glucose 10/12/2021 Negative  Negative mg/dL Final    Ketone 10/12/2021 Negative  Negative mg/dL Final    Bilirubin 10/12/2021 Negative  Negative   Final    Blood 10/12/2021 Negative  Negative   Final    Urobilinogen 10/12/2021 0.1  0.1 - 1.0 EU/dL Final    Nitrites 10/12/2021 Negative  Negative   Final    Leukocyte Esterase 10/12/2021 Negative  Negative   Final    UA:UC IF INDICATED 10/12/2021 Culture not indicated by UA result  Culture not indicated by UA result   Final    WBC 10/12/2021 0-4  0 - 4 /hpf Final    RBC 10/12/2021 0-5  0 - 5 /hpf Final    Bacteria 10/12/2021 Negative  Negative /hpf Final    Sodium 10/13/2021 148* 136 - 145 mmol/L Final    Potassium 10/13/2021 3.7  3.5 - 5.1 mmol/L Final    Chloride 10/13/2021 113* 97 - 108 mmol/L Final    CO2 10/13/2021 32  21 - 32 mmol/L Final    Anion gap 10/13/2021 3* 5 - 15 mmol/L Final    Glucose 10/13/2021 98  65 - 100 mg/dL Final    BUN 10/13/2021 13  6 - 20 mg/dL Final    Creatinine 10/13/2021 1.57* 0.55 - 1.02 mg/dL Final    BUN/Creatinine ratio 10/13/2021 8* 12 - 20   Final    GFR est AA 10/13/2021 40* >60 ml/min/1.73m2 Final    GFR est non-AA 10/13/2021 33* >60 ml/min/1.73m2 Final    Calcium 10/13/2021 10.8* 8.5 - 10.1 mg/dL Final    WBC 10/13/2021 7.6  3.6 - 11.0 K/uL Final    RBC 10/13/2021 3.53* 3.80 - 5.20 M/uL Final    HGB 10/13/2021 11.1* 11.5 - 16.0 g/dL Final    HCT 10/13/2021 36.8  35.0 - 47.0 % Final    MCV 10/13/2021 104.2* 80.0 - 99.0 FL Final    MCH 10/13/2021 31.4  26.0 - 34.0 PG Final    MCHC 10/13/2021 30.2  30.0 - 36.5 g/dL Final    RDW 10/13/2021 12.6  11.5 - 14.5 % Final    PLATELET 59/38/9006 087  150 - 400 K/uL Final    MPV 10/13/2021 11.3  8.9 - 12.9 FL Final    NRBC 10/13/2021 0.0  0.0  WBC Final    ABSOLUTE NRBC 10/13/2021 0.00  0.00 - 0.01 K/uL Final    Lithium level 10/13/2021 1.60* 0.60 - 1.20 mmol/L Final    Reported dose date 10/13/2021 Not provided    Final    Reported dose: 10/13/2021 Not provided  Units Final    T4, Free 10/13/2021 0.9  0.8 - 1.5 ng/dL Final    WBC 10/14/2021 6.8  3.6 - 11.0 K/uL Final    RBC 10/14/2021 3.57* 3.80 - 5.20 M/uL Final    HGB 10/14/2021 11.4* 11.5 - 16.0 g/dL Final    HCT 10/14/2021 37.3  35.0 - 47.0 % Final    MCV 10/14/2021 104.5* 80.0 - 99.0 FL Final    MCH 10/14/2021 31.9  26.0 - 34.0 PG Final    MCHC 10/14/2021 30.6  30.0 - 36.5 g/dL Final    RDW 10/14/2021 12.6  11.5 - 14.5 % Final    PLATELET 39/40/4404 213  150 - 400 K/uL Final    MPV 10/14/2021 11.8  8.9 - 12.9 FL Final    NRBC 10/14/2021 0.0  0.0  WBC Final    ABSOLUTE NRBC 10/14/2021 0.00  0.00 - 0.01 K/uL Final    NEUTROPHILS 10/14/2021 70  32 - 75 % Final    LYMPHOCYTES 10/14/2021 19  12 - 49 % Final  MONOCYTES 10/14/2021 7  5 - 13 % Final    EOSINOPHILS 10/14/2021 4  0 - 7 % Final    BASOPHILS 10/14/2021 0  0 - 1 % Final    IMMATURE GRANULOCYTES 10/14/2021 0  0 - 0.5 % Final    ABS. NEUTROPHILS 10/14/2021 4.7  1.8 - 8.0 K/UL Final    ABS. LYMPHOCYTES 10/14/2021 1.3  0.8 - 3.5 K/UL Final    ABS. MONOCYTES 10/14/2021 0.5  0.0 - 1.0 K/UL Final    ABS. EOSINOPHILS 10/14/2021 0.3  0.0 - 0.4 K/UL Final    ABS. BASOPHILS 10/14/2021 0.0  0.0 - 0.1 K/UL Final    ABS. IMM. GRANS. 10/14/2021 0.0  0.00 - 0.04 K/UL Final    DF 10/14/2021 AUTOMATED    Final    Sodium 10/14/2021 150* 136 - 145 mmol/L Final    Potassium 10/14/2021 3.8  3.5 - 5.1 mmol/L Final    Chloride 10/14/2021 120* 97 - 108 mmol/L Final    CO2 10/14/2021 27  21 - 32 mmol/L Final    Anion gap 10/14/2021 3* 5 - 15 mmol/L Final    Glucose 10/14/2021 85  65 - 100 mg/dL Final    BUN 10/14/2021 12  6 - 20 mg/dL Final    Creatinine 10/14/2021 1.38* 0.55 - 1.02 mg/dL Final    BUN/Creatinine ratio 10/14/2021 9* 12 - 20   Final    GFR est AA 10/14/2021 47* >60 ml/min/1.73m2 Final    GFR est non-AA 10/14/2021 38* >60 ml/min/1.73m2 Final    Calcium 10/14/2021 11.0* 8.5 - 10.1 mg/dL Final    Bilirubin, total 10/14/2021 0.5  0.2 - 1.0 mg/dL Final    AST (SGOT) 10/14/2021 29  15 - 37 U/L Final    ALT (SGPT) 10/14/2021 45  12 - 78 U/L Final    Alk.  phosphatase 10/14/2021 93  45 - 117 U/L Final    Protein, total 10/14/2021 6.0* 6.4 - 8.2 g/dL Final    Albumin 10/14/2021 3.3* 3.5 - 5.0 g/dL Final    Globulin 10/14/2021 2.7  2.0 - 4.0 g/dL Final    A-G Ratio 10/14/2021 1.2  1.1 - 2.2   Final    Lithium level 10/14/2021 1.20  0.60 - 1.20 mmol/L Final    Reported dose date 10/14/2021 Not provided    Final    Reported dose: 10/14/2021 Not provided  Units Final   Admission on 10/12/2021, Discharged on 10/12/2021   Component Date Value Ref Range Status    WBC 10/12/2021 8.7  3.6 - 11.0 K/uL Final    RBC 10/12/2021 3.77* 3.80 - 5.20 M/uL Final  HGB 10/12/2021 12.1  11.5 - 16.0 g/dL Final    HCT 10/12/2021 37.8  35.0 - 47.0 % Final    MCV 10/12/2021 100.3* 80.0 - 99.0 FL Final    MCH 10/12/2021 32.1  26.0 - 34.0 PG Final    MCHC 10/12/2021 32.0  30.0 - 36.5 g/dL Final    RDW 10/12/2021 12.5  11.5 - 14.5 % Final    PLATELET 27/33/7404 924  150 - 400 K/uL Final    MPV 10/12/2021 12.2  8.9 - 12.9 FL Final    NRBC 10/12/2021 0.0  0.0  WBC Final    ABSOLUTE NRBC 10/12/2021 0.00  0.00 - 0.01 K/uL Final    NEUTROPHILS 10/12/2021 79* 32 - 75 % Final    LYMPHOCYTES 10/12/2021 12  12 - 49 % Final    MONOCYTES 10/12/2021 7  5 - 13 % Final    EOSINOPHILS 10/12/2021 2  0 - 7 % Final    BASOPHILS 10/12/2021 0  0 - 1 % Final    IMMATURE GRANULOCYTES 10/12/2021 0  0 - 0.5 % Final    ABS. NEUTROPHILS 10/12/2021 6.9  1.8 - 8.0 K/UL Final    ABS. LYMPHOCYTES 10/12/2021 1.0  0.8 - 3.5 K/UL Final    ABS. MONOCYTES 10/12/2021 0.6  0.0 - 1.0 K/UL Final    ABS. EOSINOPHILS 10/12/2021 0.2  0.0 - 0.4 K/UL Final    ABS. BASOPHILS 10/12/2021 0.0  0.0 - 0.1 K/UL Final    ABS. IMM. GRANS.  10/12/2021 0.0  0.00 - 0.04 K/UL Final    DF 10/12/2021 AUTOMATED    Final    Troponin-High Sensitivity 10/12/2021 21  0 - 51 ng/L Final    Ventricular Rate 10/12/2021 74  BPM Final    Atrial Rate 10/12/2021 74  BPM Final    P-R Interval 10/12/2021 214  ms Final    QRS Duration 10/12/2021 94  ms Final    Q-T Interval 10/12/2021 372  ms Final    QTC Calculation (Bezet) 10/12/2021 412  ms Final    Calculated P Axis 10/12/2021 76  degrees Final    Calculated R Axis 10/12/2021 137  degrees Final    Calculated T Axis 10/12/2021 49  degrees Final    Diagnosis 10/12/2021    Final                    Value:Sinus rhythm with 1st degree A-V block  Biatrial enlargement  Right axis deviation  Pulmonary disease pattern  Incomplete right bundle branch block  Nonspecific T wave abnormality  Abnormal ECG  When compared with ECG of 03-OCT-2016 11:40,  Incomplete right bundle branch block is now Present  T wave inversion now evident in Lateral leads  QT has shortened  Confirmed by Dionicio Durham ([id number]) on 10/12/2021 1:43:59 PM      TSH 10/12/2021 4.96* 0.36 - 3.74 uIU/mL Final    Sodium 10/12/2021 144  136 - 145 mmol/L Final    Potassium 10/12/2021 3.7  3.5 - 5.1 mmol/L Final    Chloride 10/12/2021 113* 97 - 108 mmol/L Final    CO2 10/12/2021 26  21 - 32 mmol/L Final    Anion gap 10/12/2021 5  5 - 15 mmol/L Final    Glucose 10/12/2021 80  65 - 100 mg/dL Final    BUN 10/12/2021 14  6 - 20 mg/dL Final    Creatinine 10/12/2021 1.34* 0.55 - 1.02 mg/dL Final    BUN/Creatinine ratio 10/12/2021 10* 12 - 20   Final    GFR est AA 10/12/2021 48* >60 ml/min/1.73m2 Final    GFR est non-AA 10/12/2021 40* >60 ml/min/1.73m2 Final    Calcium 10/12/2021 9.8  8.5 - 10.1 mg/dL Final    Bilirubin, total 10/12/2021 0.7  0.2 - 1.0 mg/dL Final    AST (SGOT) 10/12/2021 62* 15 - 37 U/L Final    ALT (SGPT) 10/12/2021 48  12 - 78 U/L Final    Alk. phosphatase 10/12/2021 83  45 - 117 U/L Final    Protein, total 10/12/2021 6.0* 6.4 - 8.2 g/dL Final    Albumin 10/12/2021 3.3* 3.5 - 5.0 g/dL Final    Globulin 10/12/2021 2.7  2.0 - 4.0 g/dL Final    A-G Ratio 10/12/2021 1.2  1.1 - 2.2   Final    Free Triiodothyronine (T3) 10/12/2021 2.2  2.2 - 4.0 pg/mL Final    T4, Free 10/12/2021 0.9  0.8 - 1.5 ng/dL Final    Troponin-High Sensitivity 10/12/2021 26  0 - 51 ng/L Final    NT pro-BNP 10/12/2021 56  <125 pg/mL Final        Assessment:     Problem List Items Addressed This Visit        Circulatory    Peripheral vascular disease ([address] - Primary    Chronic venous hypertension involving both sides       Nervous    Weakness of both legs              Plan:     Patient and family assured about vascular status. Patient was strongly recommended try to wear compression stocking which I recommended daily basis.   Meanwhile we will make arrangement for neurology consultation as outpatient with bilateral leg weakness. And I have also advised the family if they do not hear from home health visit with outpatient physical therapy I am happy to make arrangement for them. And I will reassess her again 6 months follow-up. Again I assured the family that the post leg weakness is not related to her vascular status.         Esther Rothman MD

## 2021-11-04 ENCOUNTER — TELEPHONE (OUTPATIENT)
Dept: ENDOCRINOLOGY | Age: 66
End: 2021-11-04

## 2021-11-04 DIAGNOSIS — E04.2 MULTINODULAR GOITER: Primary | ICD-10-CM

## 2021-11-04 NOTE — TELEPHONE ENCOUNTER
----- Message from Héctor Jarquin sent at 11/4/2021  3:39 PM EDT -----  Regarding: /telephone  General Message/Vendor Calls    Caller's first and last name:Yossi Naranjo (pt's )      Reason for call:pt's  is request treatments for thyroid       Callback required yes/no and why:yes to speak with Dr. Rubio Elaine contact number(s):[phone]      Details to clarify the request: pt's  called to request a treatment for thyroid.       Héctor Jarquin

## 2021-11-04 NOTE — TELEPHONE ENCOUNTER
Patients  would like his wife to be tested for thyroid. Can you call him and or mail him a lab form please.  Thank you

## 2021-11-05 LAB — CREATININE, EXTERNAL: 1.22

## 2021-12-29 ENCOUNTER — OFFICE VISIT (OUTPATIENT)
Dept: ENDOCRINOLOGY | Age: 66
End: 2021-12-29
Payer: MEDICARE

## 2021-12-29 VITALS
RESPIRATION RATE: 18 BRPM | HEIGHT: 67 IN | DIASTOLIC BLOOD PRESSURE: 70 MMHG | SYSTOLIC BLOOD PRESSURE: 128 MMHG | HEART RATE: 98 BPM | BODY MASS INDEX: 26.93 KG/M2 | OXYGEN SATURATION: 98 % | TEMPERATURE: 98.3 F | WEIGHT: 171.6 LBS

## 2021-12-29 DIAGNOSIS — M81.0 SENILE OSTEOPOROSIS: ICD-10-CM

## 2021-12-29 DIAGNOSIS — E04.2 MULTINODULAR GOITER: ICD-10-CM

## 2021-12-29 DIAGNOSIS — E55.9 VITAMIN D DEFICIENCY: ICD-10-CM

## 2021-12-29 DIAGNOSIS — E83.52 HYPERCALCEMIA: Primary | ICD-10-CM

## 2021-12-29 PROCEDURE — 99214 OFFICE O/P EST MOD 30 MIN: CPT | Performed by: INTERNAL MEDICINE

## 2021-12-29 PROCEDURE — G8427 DOCREV CUR MEDS BY ELIG CLIN: HCPCS | Performed by: INTERNAL MEDICINE

## 2021-12-29 PROCEDURE — G8417 CALC BMI ABV UP PARAM F/U: HCPCS | Performed by: INTERNAL MEDICINE

## 2021-12-29 PROCEDURE — 3017F COLORECTAL CA SCREEN DOC REV: CPT | Performed by: INTERNAL MEDICINE

## 2021-12-29 PROCEDURE — G8752 SYS BP LESS 140: HCPCS | Performed by: INTERNAL MEDICINE

## 2021-12-29 PROCEDURE — G8754 DIAS BP LESS 90: HCPCS | Performed by: INTERNAL MEDICINE

## 2021-12-29 PROCEDURE — G8510 SCR DEP NEG, NO PLAN REQD: HCPCS | Performed by: INTERNAL MEDICINE

## 2021-12-29 RX ORDER — LACTULOSE 10 G/15ML
SOLUTION ORAL; RECTAL
COMMUNITY
Start: 2021-11-30

## 2021-12-29 NOTE — PROGRESS NOTES
HISTORY OF PRESENT ILLNESS  Janki Adame is a 61 y.o. female. f/u after  Last visit for thyroid goiter and hypercalcemia   From  June 2021     She has \" toe pain :   Also is c/o  Dental pain       June 2021     C/o feet pain     August 2020       She looked drowsy - appeared confused   She thought I am her nephrologist   Forgot why she was seeing me       Old history :    Last seen June 2017   Pt had FNA done in jan 2018   Negative for cancer   Pt had undergone parathyroid study  In nov 2018 for hypercalcemia eval  - done by nephrologist           Old history   Referred by Dr. Rubia Mesa   Patient is not a good historian   She denies any hyper or hypothyroid symptoms   She denies any compressive symptoms   Past medical history : she has psychiatric issues/ schizophrenia  - on lithium and topiramate           Review of Systems   Feet pain /issues     Physical Exam   Constitutional: She is oriented to person, place, and time. She appears well-developed and well-nourished. Psychiatric: She has a normal mood and affect. Lab Results   Component Value Date/Time    GFR est non-AA 38 (L) 10/14/2021 07:34 AM    GFR est AA 47 (L) 10/14/2021 07:34 AM    Creatinine 1.38 (H) 10/14/2021 07:34 AM    BUN 12 10/14/2021 07:34 AM    Sodium 150 (H) 10/14/2021 07:34 AM    Potassium 3.8 10/14/2021 07:34 AM    Chloride 120 (H) 10/14/2021 07:34 AM    CO2 27 10/14/2021 07:34 AM    Phosphorus 3.3 05/17/2021 10:46 AM    PTH, Intact 133.5 (H) 05/17/2021 10:46 AM     Lab Results   Component Value Date/Time    TSH 4.96 (H) 10/12/2021 11:50 AM    T4, Free 0.9 10/13/2021 07:00 AM            ASSESSMENT and PLAN      1.  Hypercalcemia : calcium is at 10  From  Nov 2021 ; egfr  Is  54 ml/min , on sensopar 30 mg a day - will continue      hyperparathyroidism,  AND SHE HAS BOTH SECONDARY AND PRIMARY HYPERPARATHYRODISM    HER NEPHROLOGIST IS DR. Ashutosh Martinez   The parathyroid scan at [facility]  Showed positivity on left inferior pole matching to the location of  parathyrodi nodule on usg done at [facility]   She can go for parathyroidectomy,  but pt deferred it         2. Sub-clinical hypothyroidism  - TSH over 4 in oct 2021   Not starting on supplementation       3. Mulltinodular goiter   usg : July 2017    Left side upper nodule is thyroid nodule  And   Lower nodule is 1.8 cm seems parathyroid nodule   FNA  Done in jan 2018  On left lobe upper nodule -    Negative for cancer     She is asymptomatic   August 2020  -  thyroid ultrasound  - heterogenous  Thyroid gland - no   Nodules       4. Osteoporosis :   Date : july 2017  Bone DEXA  ap spine T-score 1.5 ; left femoral Neck T- score  -0.7 , right femoral neck T-score n/ a  Date : august 2020   Bone DEXA  ap spine T-score 1.1 ; left femoral Neck T- score -0.9 , right femoral neck T-score n/a*    BONE LOSS COULD BE RENAL OSTEODYSTROPHY VERSUS OSTEOPOROSIS FROM PRIMAY AND SECONDARY REASONS       4. ON LITHIUM FOR BIPOLAR DISEASE       5.   Foot pain is being addressed by ortho/podiatrist   She has severe dental pain and has to see dentist         F/u in 1 year  With DEXA and labs   Reviewed results with patient and discussed the labs being ordered today/bnv  Patient voiced understanding of plan of care

## 2021-12-29 NOTE — PROGRESS NOTES
Room 2    Identified pt with two pt identifiers(name and ). Reviewed record in preparation for visit and have obtained necessary documentation. All patient medications has been reviewed. Chief Complaint   Patient presents with    Thyroid Problem       3 most recent PHQ Screens 2021   PHQ Not Done -   Little interest or pleasure in doing things Not at all   Feeling down, depressed, irritable, or hopeless Not at all   Total Score PHQ 2 0         Health Maintenance Review: Patient reminded of \"due or due soon\" health maintenance. I have asked the patient to contact his/her primary care provider (PCP) for follow-up on his/her health maintenance. Vitals:    21 1004   BP: 128/70   Pulse: 98   Resp: 18   Temp: 98.3 °F (36.8 °C)   TempSrc: Temporal   SpO2: 98%   Weight: 171 lb 9.6 oz (77.8 kg)   Height: 5' 7\" (1.702 m)   PainSc:   7   PainLoc: Foot         No results found for: HBA1C, [id number], [id number], PCA8ANKA    Coordination of Care Questionnaire:   1) Have you been to an emergency room, urgent care, or hospitalized since your last visit?   no       2. Have seen or consulted any other health care provider since your last visit? NO      Patient is accompanied by self I have received verbal consent from Debera Eisenmenger to discuss any/all medical information while they are present in the room.

## 2021-12-29 NOTE — PATIENT INSTRUCTIONS
SPECIFIC INSTRUCTIONS BELOW         Stay on sensipar 30 mg a day         -------------PAY ATTENTION TO THESE GENERAL INSTRUCTIONS -----------------      - The medications prescribed at this visit will not be available at pharmacy until 6 pm       - YOUR MED LIST IS NOT UP TO DATE AS SOME CHANGES ARE BEING MADE AFTER THE VISIT - FOLLOW SPECIFIC INSTRUCTIONS  ABOVE     -ANY tests other than blood work, which you opt to do  outside the  [facility] imaging facilities, you are responsible for prior authorizations if  required    - [address] [facility]- PLEASE IGNORE     Results     *Normal results will not be notified by a phone call starting January 1 2021   *If you have an upcoming visit, the results will be discussed at the visit   *Please sign up for MY CHART if you want access to your lab and test results  *Abnormal results which require immediate attention will be notified by phone call   *Abnormal results which do not require immediate assistance will be notified in 1-2 weeks       Refills    -    have your pharmacy send us a refill request . Refills are done max for one year and a visit is a must before refills are extended    Follow up appointments -  highly encourage you to make it when you are checking out. We can accommodate you into the schedule based on your clinical situation, but not for extending refills beyond a year. Labs are important to give refills and is important to get labs before the visit     Phone calls  -  Allow  24 hrs.  for non-urgent calls to be returned  Prior authorization - It may take 2-4 weeks to process  Forms  -  FMLA, DMV etc., will take up to 2 weeks to process  Cancellations - please notify the office 2 days in advance   Samples  - will only be dispensed at visits       If not showing for the appointments and cancelling appointments within 24 hours are kept track of and three  of such situations in  two consecutive years will likely be considered for termination from the practice    -------------------------------------------------------------------------------------------------------------------

## 2022-01-04 ENCOUNTER — OFFICE VISIT (OUTPATIENT)
Dept: NEUROLOGY | Age: 67
End: 2022-01-04
Payer: MEDICARE

## 2022-01-04 VITALS
DIASTOLIC BLOOD PRESSURE: 78 MMHG | OXYGEN SATURATION: 99 % | BODY MASS INDEX: 26.68 KG/M2 | HEART RATE: 80 BPM | HEIGHT: 67 IN | RESPIRATION RATE: 16 BRPM | WEIGHT: 170 LBS | SYSTOLIC BLOOD PRESSURE: 128 MMHG

## 2022-01-04 DIAGNOSIS — R26.9 GAIT DIFFICULTY: Primary | ICD-10-CM

## 2022-01-04 DIAGNOSIS — R29.898 WEAKNESS OF BOTH LEGS: ICD-10-CM

## 2022-01-04 DIAGNOSIS — R26.1: ICD-10-CM

## 2022-01-04 DIAGNOSIS — M62.838 MUSCLE SPASTICITY: ICD-10-CM

## 2022-01-04 PROCEDURE — 99205 OFFICE O/P NEW HI 60 MIN: CPT | Performed by: PSYCHIATRY & NEUROLOGY

## 2022-01-04 PROCEDURE — G8427 DOCREV CUR MEDS BY ELIG CLIN: HCPCS | Performed by: PSYCHIATRY & NEUROLOGY

## 2022-01-04 PROCEDURE — G8417 CALC BMI ABV UP PARAM F/U: HCPCS | Performed by: PSYCHIATRY & NEUROLOGY

## 2022-01-04 PROCEDURE — G8432 DEP SCR NOT DOC, RNG: HCPCS | Performed by: PSYCHIATRY & NEUROLOGY

## 2022-01-04 PROCEDURE — 3017F COLORECTAL CA SCREEN DOC REV: CPT | Performed by: PSYCHIATRY & NEUROLOGY

## 2022-01-04 NOTE — PROGRESS NOTES
Chief Complaint   Patient presents with    New Patient     left foot pain, for over a year now, walks with a cane, she states her vascular provider referred her     Visit Vitals  /78 (BP 1 Location: Left upper arm)   Pulse 80   Resp 16   Ht 5' 7\" (1.702 m)   Wt 170 lb (77.1 kg)   SpO2 99%   BMI 26.63 kg/m²
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
## 2022-01-19 ENCOUNTER — HOSPITAL ENCOUNTER (OUTPATIENT)
Dept: LAB | Age: 67
Discharge: HOME | End: 2022-01-19
Payer: MEDICARE

## 2022-01-19 LAB — CK SERPL-CCNC: 63.73 U/L (ref 26–192)

## 2022-01-19 PROCEDURE — 36415 COLL VENOUS BLD VENIPUNCTURE: CPT

## 2022-01-19 PROCEDURE — 82550 ASSAY OF CK (CPK): CPT

## 2022-01-19 PROCEDURE — 82607 VITAMIN B-12: CPT

## 2022-01-20 LAB
FOLATE SERPL-MCNC: 13.4 NG/ML (ref 5–21)
VIT B12 SERPL-MCNC: 263 PG/ML (ref 193–986)

## 2022-01-26 ENCOUNTER — HOSPITAL ENCOUNTER (OUTPATIENT)
Dept: MRI IMAGING | Age: 67
Discharge: HOME | End: 2022-01-26
Attending: PSYCHIATRY & NEUROLOGY
Payer: MEDICARE

## 2022-01-26 DIAGNOSIS — R26.9 GAIT DIFFICULTY: ICD-10-CM

## 2022-01-26 DIAGNOSIS — M62.838 MUSCLE SPASTICITY: ICD-10-CM

## 2022-01-26 DIAGNOSIS — R29.898 WEAKNESS OF BOTH LEGS: ICD-10-CM

## 2022-01-26 DIAGNOSIS — R26.1: ICD-10-CM

## 2022-01-26 PROCEDURE — 72146 MRI CHEST SPINE W/O DYE: CPT

## 2022-01-26 PROCEDURE — 72141 MRI NECK SPINE W/O DYE: CPT

## 2022-01-26 PROCEDURE — 70551 MRI BRAIN STEM W/O DYE: CPT

## 2022-01-26 NOTE — PROGRESS NOTES
Compared to MRI C-spine in 2018: new spinal cord compression at C3-4, C4-5 with cord signal abnormality at C4-5 level, suspected to be myelomalacia rather than cord contusion. No thoracic cord abnormalities or canal stenosis.

## 2022-02-08 ENCOUNTER — TRANSCRIBE ORDER (OUTPATIENT)
Dept: SCHEDULING | Age: 67
End: 2022-02-08

## 2022-02-08 DIAGNOSIS — Z12.31 VISIT FOR SCREENING MAMMOGRAM: Primary | ICD-10-CM

## 2022-02-10 LAB
Lab: NORMAL
REFERENCE LAB,REFLB: NORMAL
TEST DESCRIPTION:,ATST: NORMAL

## 2022-02-15 ENCOUNTER — TELEPHONE (OUTPATIENT)
Dept: NEUROLOGY | Age: 67
End: 2022-02-15

## 2022-03-04 ENCOUNTER — OFFICE VISIT (OUTPATIENT)
Dept: SURGERY | Age: 67
End: 2022-03-04
Payer: MEDICARE

## 2022-03-04 VITALS
BODY MASS INDEX: 26.15 KG/M2 | SYSTOLIC BLOOD PRESSURE: 139 MMHG | HEART RATE: 89 BPM | OXYGEN SATURATION: 95 % | HEIGHT: 67 IN | RESPIRATION RATE: 16 BRPM | TEMPERATURE: 98 F | WEIGHT: 166.6 LBS | DIASTOLIC BLOOD PRESSURE: 83 MMHG

## 2022-03-04 DIAGNOSIS — I87.303 CHRONIC VENOUS HYPERTENSION INVOLVING BOTH SIDES: ICD-10-CM

## 2022-03-04 DIAGNOSIS — R29.898 WEAKNESS OF BOTH LEGS: Primary | ICD-10-CM

## 2022-03-04 PROCEDURE — G8417 CALC BMI ABV UP PARAM F/U: HCPCS | Performed by: SURGERY

## 2022-03-04 PROCEDURE — G8510 SCR DEP NEG, NO PLAN REQD: HCPCS | Performed by: SURGERY

## 2022-03-04 PROCEDURE — 1101F PT FALLS ASSESS-DOCD LE1/YR: CPT | Performed by: SURGERY

## 2022-03-04 PROCEDURE — 3017F COLORECTAL CA SCREEN DOC REV: CPT | Performed by: SURGERY

## 2022-03-04 PROCEDURE — G8536 NO DOC ELDER MAL SCRN: HCPCS | Performed by: SURGERY

## 2022-03-04 PROCEDURE — 99213 OFFICE O/P EST LOW 20 MIN: CPT | Performed by: SURGERY

## 2022-03-04 PROCEDURE — G8427 DOCREV CUR MEDS BY ELIG CLIN: HCPCS | Performed by: SURGERY

## 2022-03-04 PROCEDURE — G8399 PT W/DXA RESULTS DOCUMENT: HCPCS | Performed by: SURGERY

## 2022-03-04 PROCEDURE — 1090F PRES/ABSN URINE INCON ASSESS: CPT | Performed by: SURGERY

## 2022-03-04 NOTE — PROGRESS NOTES
Chief Complaint   Patient presents with    Follow-up     Right Foot Pain      Visit Vitals  /83 (BP 1 Location: Left arm, BP Patient Position: Sitting)   Pulse 89   Temp 98 °F (36.7 °C) (Temporal)   Resp 16   Ht 5' 7\" (1.702 m)   Wt 166 lb 9.6 oz (75.6 kg)   SpO2 95%   BMI 26.09 kg/m²     1. Have you been to the ER, urgent care clinic since your last visit? Hospitalized since your last visit? No    2. Have you seen or consulted any other health care providers outside of the [address] [place] [address] since your last visit? Include any pap smears or colon screening.  No

## 2022-03-05 NOTE — PROGRESS NOTES
VASCULAR FOLLOW UP      Subjective:   CHIEF COMPLAINTS:  Leg weakness better  PRESENTATION OF ILLNESS:  Janki Herrera is a 74 y.o. very pleasant woman is here today 6-month follow-up. She is doing much better now her leg weakness has improved still complaining of right-sided weakness. Her swelling is improved. She has been wearing compression stocking daily basis. And her mobility has improved as well she is also seeing currently neurology about these issues. Past Medical History:   Diagnosis Date    Hypertension     Psychotic disorder ([address]     Schizophrenia      Past Surgical History:   Procedure Laterality Date    HX COLONOSCOPY      HX OTHER SURGICAL      Ingrown toenail removal     Family History   Problem Relation Age of Onset    No Known Problems Mother     No Known Problems Father       Social History     Tobacco Use    Smoking status: Current Every Day Smoker     Packs/day: 0.50     Years: 45.00     Pack years: 22.50    Smokeless tobacco: Never Used   Substance Use Topics    Alcohol use: Not Currently       Prior to Admission medications    Medication Sig Start Date End Date Taking? Authorizing Provider   lactulose (CHRONULAC) 10 gram/15 mL solution TAKE 30 MILLILITERS BY MOUTH once daily 11/30/21  Yes Provider, Historical   cinacalcet (SENSIPAR) 30 mg tablet Take 30 mg by mouth daily. Yes Provider, Historical   amLODIPine (NORVASC) 5 mg tablet Take 10 mg by mouth daily. Yes Provider, Historical   OLANZapine (ZYPREXA) 20 mg tablet Take 10 mg by mouth nightly.   Patient not taking: Reported on 3/4/2022 5/14/17   Provider, Historical     No Known Allergies     Review of Systems:  I reviewed the rest of organ systems personally and they were negative signed by Dr. Ivett Parekh    Objective:     Visit Vitals  /83 (BP 1 Location: Left arm, BP Patient Position: Sitting)   Pulse 89   Temp 98 °F (36.7 °C) (Temporal)   Resp 16   Ht 5' 7\" (1.702 m)   Wt 166 lb 9.6 oz (75.6 kg)   SpO2 95%   BMI 26.09 kg/m²     VITAL SIGNS REVIEWED. Physical Exam:  Patient is well-nourished pleasant in conversation is appropriate. Head and neck examination atraumatic, normocephalic. Gaze appropriate. Conversation appropriate. Neck examination shows supple. No mass. No obvious carotid bruit. Chest examination shows lungs are clear bilaterally well-expanded, no crackles or wheezes. Cardiovascular system regular rate, no obvious murmur. Skin warm to touch  and moist, no skin lesions. Abdomen is soft ,not tender or distended bowel sounds present. No palpable mass. Neurological examinations, no focal neuro deficits moving all 4 extremities. Cranial nerves intact. Sensation is intact as well. Hematologic: No obvious bruise or swelling or obvious lymphadenopathy. Psychosocial: Appropriate. Has good effect. Musculoskeletal system: No muscle wasting, appropriate movements upper and lower extremity. Vascular examination: Vascular examination shows patient has a triphasic signal on both DP. Swelling is mild to moderate. Donney Art' sign is negative. Data Review:   No visits with results within 1 Month(s) from this visit. Latest known visit with results is:   Hospital Outpatient Visit on 01/19/2022   Component Date Value Ref Range Status    Vitamin B12 01/19/2022 263  193 - 986 pg/mL Final    Folate 01/19/2022 13.4  5.0 - 21.0 ng/mL Final    CK 01/19/2022 63.73  26 - 192 U/L Final    Test Description: 01/25/2022 Acetylcholine Receptor Ab, All Code: 385476   Final    Reference Lab: 01/25/2022 Acetylcholine Receptor Ab, All CODE: [id number]   Final    Results: 01/25/2022 SEE [facility] REPORT IN PATIENTS CHART   Final        Assessment:     Problem List Items Addressed This Visit        Circulatory    Chronic venous hypertension involving both sides       Nervous    Weakness of both legs - Primary              Plan:     Patient is doing well since last visit. Her mobility and weakness is improved.   Patient was advised continue her compression stocking. I will reassess her again complaints of venous evaluation without follow-up duplex ultrasound.         Tianna Estrella MD

## 2022-03-07 ENCOUNTER — OFFICE VISIT (OUTPATIENT)
Dept: NEUROLOGY | Age: 67
End: 2022-03-07

## 2022-03-07 ENCOUNTER — OFFICE VISIT (OUTPATIENT)
Dept: NEUROLOGY | Age: 67
End: 2022-03-07
Payer: MEDICARE

## 2022-03-07 VITALS
OXYGEN SATURATION: 97 % | BODY MASS INDEX: 26.06 KG/M2 | WEIGHT: 166 LBS | HEART RATE: 78 BPM | RESPIRATION RATE: 16 BRPM | HEIGHT: 67 IN

## 2022-03-07 DIAGNOSIS — M48.02 MYELOPATHY CONCURRENT WITH AND DUE TO SPINAL STENOSIS OF CERVICAL REGION (HCC): Primary | ICD-10-CM

## 2022-03-07 DIAGNOSIS — M62.838 MUSCLE SPASTICITY: ICD-10-CM

## 2022-03-07 DIAGNOSIS — G99.2 MYELOPATHY CONCURRENT WITH AND DUE TO SPINAL STENOSIS OF CERVICAL REGION (HCC): Primary | ICD-10-CM

## 2022-03-07 DIAGNOSIS — M54.12 RIGHT CERVICAL RADICULOPATHY: Primary | ICD-10-CM

## 2022-03-07 DIAGNOSIS — R29.898 WEAKNESS OF BOTH LEGS: ICD-10-CM

## 2022-03-07 DIAGNOSIS — M54.16 RIGHT LUMBAR RADICULOPATHY: ICD-10-CM

## 2022-03-07 DIAGNOSIS — M54.12 RIGHT CERVICAL RADICULOPATHY: ICD-10-CM

## 2022-03-07 DIAGNOSIS — E53.8 LOW SERUM VITAMIN B12: ICD-10-CM

## 2022-03-07 DIAGNOSIS — R26.9 GAIT DIFFICULTY: ICD-10-CM

## 2022-03-07 PROCEDURE — 99215 OFFICE O/P EST HI 40 MIN: CPT | Performed by: PSYCHIATRY & NEUROLOGY

## 2022-03-07 PROCEDURE — G8536 NO DOC ELDER MAL SCRN: HCPCS | Performed by: PSYCHIATRY & NEUROLOGY

## 2022-03-07 PROCEDURE — 95911 NRV CNDJ TEST 9-10 STUDIES: CPT | Performed by: PSYCHIATRY & NEUROLOGY

## 2022-03-07 PROCEDURE — G8427 DOCREV CUR MEDS BY ELIG CLIN: HCPCS | Performed by: PSYCHIATRY & NEUROLOGY

## 2022-03-07 PROCEDURE — G8417 CALC BMI ABV UP PARAM F/U: HCPCS | Performed by: PSYCHIATRY & NEUROLOGY

## 2022-03-07 PROCEDURE — 1101F PT FALLS ASSESS-DOCD LE1/YR: CPT | Performed by: PSYCHIATRY & NEUROLOGY

## 2022-03-07 PROCEDURE — G9899 SCRN MAM PERF RSLTS DOC: HCPCS | Performed by: PSYCHIATRY & NEUROLOGY

## 2022-03-07 PROCEDURE — G8510 SCR DEP NEG, NO PLAN REQD: HCPCS | Performed by: PSYCHIATRY & NEUROLOGY

## 2022-03-07 PROCEDURE — 1090F PRES/ABSN URINE INCON ASSESS: CPT | Performed by: PSYCHIATRY & NEUROLOGY

## 2022-03-07 PROCEDURE — 95886 MUSC TEST DONE W/N TEST COMP: CPT | Performed by: PSYCHIATRY & NEUROLOGY

## 2022-03-07 PROCEDURE — 3017F COLORECTAL CA SCREEN DOC REV: CPT | Performed by: PSYCHIATRY & NEUROLOGY

## 2022-03-07 PROCEDURE — G8399 PT W/DXA RESULTS DOCUMENT: HCPCS | Performed by: PSYCHIATRY & NEUROLOGY

## 2022-03-07 RX ORDER — LANOLIN ALCOHOL/MO/W.PET/CERES
1000 CREAM (GRAM) TOPICAL DAILY
Qty: 90 TABLET | Refills: 0 | Status: SHIPPED | OUTPATIENT
Start: 2022-03-07 | End: 2022-06-05

## 2022-03-07 NOTE — PROGRESS NOTES
Hema Ventura (1955) is a 74 y.o. female, new patient, here for evaluation of the following     Chief complaint(s):   Chief Complaint   Patient presents with    Results     EMG accompanied by her brother       SUBJECTIVE/ OBJECTIVE:    HPI: 74 y.o. female      Present with brother to go over test results regarding gait difficulty    EMG right upper and lower extremity done today: 1) Right C5 or C6 cervical radiculopathy, 2) Right S1 lumbar radiculopathy, 3) No electrodiagnostic evidence of large fiber polyneuropathy. MRI Brain (1/26/2022): Mild cerebral white matter signal abnormalities consistent with age-related changes/intracranial small vessel disease. MRI C-spine and T-spine (1/26/2022): Compared to 3-:   1. New cord compression at C3-4 and C4-5. New myelomalacia more likely than cord contusion at C4-5.  2. Severe central spinal canal stenosis at C3-4 and C4-5. Moderate central spinal canal stenosis at C5-6.  3. Multilevel foraminal stenoses in the cervical spine. 4. Normal thoracic spinal cord. No stenosis in the thoracic spine. 5. 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 Labs: CK 63 (normal), B12 263 (low end of normal, reference range 193-986      Discussed with brother and patient that her spastic paraparetic gait is due to the cervical spinal stenosis, cord compression and cord contusion. She also has had chronic urinary incontinence of unknown known cause for which she has a bladder stimulator. Discussed with him that this / urinary incontinence is most likely due to the spinal cord compression. Discussed with her that EMG test showed pinched nerve on the right side of the neck and right lower back, neither of which is the cause of her gait difficulty. Discussed that the treatment for this is spine surgery to reduce the compression of the spinal cord, will need to see spine surgeon for this.   Brother requested that she be referred to Dr. Savannah Suarez (Brother indicates Dr Darryle Castillo did a surgery on him in the past).          ========================================    Brief Hx:     Initial Visit: 1-4-2022 (see for full details)      No Known Allergies      Current Outpatient Medications:     cyanocobalamin 1,000 mcg tablet, Take 1 Tablet by mouth daily for 90 days. Low B12 level. Indications: prevention of vitamin B12 deficiency, Disp: 90 Tablet, Rfl: 0    lactulose (CHRONULAC) 10 gram/15 mL solution, TAKE 30 MILLILITERS BY MOUTH once daily, Disp: , Rfl:     cinacalcet (SENSIPAR) 30 mg tablet, Take 30 mg by mouth daily. , Disp: , Rfl:     amLODIPine (NORVASC) 5 mg tablet, Take 10 mg by mouth daily. , Disp: , Rfl:     OLANZapine (ZYPREXA) 20 mg tablet, Take 10 mg by mouth nightly. (Patient not taking: Reported on 3/4/2022), Disp: , Rfl:      has a past medical history of Hypertension and Psychotic disorder ([facility] [address] has a past surgical history that includes hx other surgical and hx colonoscopy. Physical Exam:    Vitals:    03/07/22 1445   Pulse: 78   Resp: 16   Height: 5' 7\" (1.702 m)   Weight: 75.3 kg (166 lb)   SpO2: 97%     Awake, alert, conversant  EOMI  Hearing/ speech are normal  Strength:   RUExt: 5/5 proximal, 4+/5 distal  LUExt: 5/5 proximal and 5/5 distal    4/5 both legs  Stands and ambulates with spastic paraparetic gait (requires rolator).     ========================================    ASSESSMENT/ PLAN:       ICD-10-CM ICD-9-CM    1. Myelopathy concurrent with and due to spinal stenosis of cervical region ([facility]  M48.02 723.0 REFERRAL TO ORTHOPEDICS    G99.2 336.3    2. Gait difficulty  R26.9 781.2 REFERRAL TO ORTHOPEDICS   3. Weakness of both legs  R29.898 729.89 REFERRAL TO ORTHOPEDICS   4. Muscle spasticity  M62.838 728.85 REFERRAL TO ORTHOPEDICS   5. Right cervical radiculopathy  M54.12 723.4 REFERRAL TO ORTHOPEDICS   6. Right lumbar radiculopathy  M54.16 724.4    7.  Low serum vitamin B12  E53.8 266.2 cyanocobalamin 1,000 mcg tablet Entered referral to see Ortho-Spine to discuss surgical treatment of cervical myelopathy    D/w patient-Brother that B12 level is at low end of normal range and she should start taking B12 supplement (1000 microgram daily, OTC). Sent Rx to her pharmacy in case her insurance will cover. Cc'd PCP/ Dr Yvrose Sharma and Vascular Surgery/ Dr Cecy Roa (who referred patient to Neurology Clinic for evaluation of her gait difficulty)      Follow up with Neurology if needed        An electronic signature was used to authenticate this note.   -- Anais Ortiz MD

## 2022-03-07 NOTE — LETTER
3/7/2022    Patient: Ursula Melissa   YOB: 1955   Date of Visit: 3/7/2022     Zhanna Gomez MD  [address] [facility] [address]  Via Fax: [phone]    Dear Zhanna Gomez MD,      Thank you for referring Ms. Laura Adams to [facility] for evaluation. My notes for this consultation are attached. If you have questions, please do not hesitate to call me. I look forward to following your patient along with you.       Sincerely,    Buddy Marshall MD

## 2022-03-07 NOTE — PROCEDURES
EMG/ NCS Report  DRUG REHABILITATION  - [facility]  [address] [place] [address], [address] [place] [address] [address]   Ph: [phone]   FAX: [phone]/ [phone]    Test Date:  3/7/2022    Patient: Verenice Martinez : 1955 Physician: Jn Talavera M.D. Sex: Female Height: ' \" Ref Phys: Jn Talavera M.D.   ID#:  Weight:  lbs. Technician: Shilpa Berkowitz     Patient History:     CC: Jerome. legs and arm weakness,numbness and gait disturbance. EMG & NCV Findings:  Evaluation of the right Fibular motor nerve showed normal distal onset latency (3.6 ms), normal amplitude (1.5 mV), normal conduction velocity (B Fib-Ankle, 45 m/s), and normal conduction velocity (Poplt-B Fib, 71 m/s). The right median motor nerve showed normal distal onset latency (4.0 ms), normal amplitude (8.8 mV), and normal conduction velocity (Elbow-Wrist, 58 m/s). The right tibial motor nerve showed normal distal onset latency (6.1 ms), normal amplitude (5.5 mV), and normal conduction velocity (Knee-Ankle, 57 m/s). The right ulnar motor nerve showed normal distal onset latency (2.6 ms), normal amplitude (7.0 mV), normal conduction velocity (B Elbow-Wrist, 66 m/s), and normal conduction velocity (A Elbow-B Elbow, 67 m/s). The right median sensory, the right radial sensory, the right Sup Fibular sensory, and the right ulnar sensory nerves showed normal distal peak latency (R3.3, R1.7, R2.6, R3.0 ms) and normal amplitude (R16.8, R44.9, R5.3, R17.4 µV). The right sural sensory nerve showed normal distal peak latency (2.8 ms) and reduced amplitude (2.8 µV). The right median/ulnar (palm) comparison nerve showed normal distal onset latency (Median Palm, 1.6 ms), normal distal peak latency (Median Palm, 2.1 ms), normal amplitude (Median Palm, 24.4 µV), normal distal onset latency (Ulnar Palm, 1.6 ms), normal distal peak latency (Ulnar Palm, 2.0 ms), and normal amplitude (Ulnar Palm, 9.9 µV).       All F Wave latencies were within normal limits. Needle evaluation of the right first dorsal interosseous muscle showed diminished recruitment. The right biceps muscle showed increased insertional activity, diminished recruitment, increased motor unit amplitude, and slightly increased polyphasic potentials. The right deltoid and the right gluteus javy muscles showed diminished recruitment, increased motor unit amplitude, and moderately increased polyphasic potentials. The right medial gastrocnemius muscle showed slightly increased spontaneous activity, diminished recruitment, and decreased motor unit amplitude. All remaining muscles (as indicated in the following table) showed no evidence of electrical instability. Impression:    Right C5 or C6 cervical radiculopathy    Right S1 lumbar radiculopathy    No electrodiagnostic evidence of large-fiber polyneuropathy, right CTS, or right ulnar neuropathy.      ___________________________  Michael Mcintosh M.D.      Nerve Conduction Studies  Anti Sensory Summary Table     Stim Site NR Peak (ms) Norm Peak (ms) P-T Amp (µV) Norm P-T Amp Site1 Site2 Dist (cm)   Right Median Anti Sensory (2nd Digit)  32.7 °C   Wrist    3.3 <4 16.8 >13 Wrist 2nd Digit 14.0   Elbow    3.3  25.0  Elbow Wrist 0.0   Right Radial Anti Sensory (Base 1st Digit)  32.4 °C   Wrist    1.7 <2.8 44.9 >11 Wrist Base 1st Digit 10.0   Right Sup Fibular Anti Sensory (Lat ankle)  33.3 °C   Lower leg    2.6 <4.6 5.3 >4 Lower leg Lat ankle 10.0   Site 2    2.5  7.9       Site 3    2.6  5.7       Right Sural Anti Sensory (Lat Mall)  32.8 °C   Calf    2.8 <4.5 2.8 >4.0 Calf Lat Mall 14.0   Site 2    3.0  0.7       Site 3    3.1  2.1       Right Ulnar Anti Sensory (5th Digit)  32.5 °C   Wrist    3.0 <4.0 17.4 >9 Wrist 5th Digit 14.0   B Elbow    3.2  20.1  B Elbow Wrist 0.0     Motor Summary Table     Stim Site NR Onset (ms) Norm Onset (ms) O-P Amp (mV) Norm O-P Amp Amp (Prev) (%) Site1 Site2 Dist (cm) Troy (m/s) Norm Troy (m/s)   Right Fibular Motor (Ext Dig Brev)  33 °C   Ankle    3.6 <6.5 1.5 >1.1 100.0 Ankle Ext Dig Brev 8.0     B Fib    10.2  1.3  86.7 B Fib Ankle 30.0 45 >38   Poplt    11.6  1.1  84.6 Poplt B Fib 10.0 71 >42   Right Median Motor (Abd Poll Brev)  32.7 °C   Wrist    4.0 <4.5 8.8 >4.1 100.0 Wrist Abd Poll Brev 8.0     Elbow    7.3  8.6  97.7 Elbow Wrist 19.0 58 >49   Right Tibial Motor (Abd Salas Brev)  32.8 °C   Ankle    6.1 <6.1 5.5 >1.1 100.0 Ankle Abd Salas Brev 8.0     Knee    11.7  3.4  61.8 Knee Ankle 32.0 57 >39   Right Ulnar Motor (Abd Dig Minimi)  32.7 °C   Wrist    2.6 <3.1 7.0 >7.0 100.0 Wrist Abd Dig Minimi 8.0     B Elbow    5.5  6.8  97.1 B Elbow Wrist 19.0 66 >50   A Elbow    7.0  6.6  97.1 A Elbow B Elbow 10.0 67 >50     Comparison Summary Table     Stim Site NR Peak (ms) P-T Amp (µV) Site1 Site2 Dist (cm) Delta-0 (ms)   Right Median/Ulnar Palm Comparison (Wrist)  32.5 °C   Median Palm    2.1 26.2 Median Palm Ulnar Palm 8.0 0.0   Ulnar Palm    2.0 15.5         F Wave Studies     NR F-Lat (ms) Lat Norm (ms) L-R F-Lat (ms) L-R Lat Norm   Right Tibial (Mrkrs) (Abd Hallucis)  32.9 °C      50.41 <56  <5.7   Right Ulnar (Mrkrs) (Abd Dig Min)  32.7 °C      24.27 <32  <2.5     H Reflex Studies     NR H-Lat (ms) L-R H-Lat (ms) L-R Lat Norm   Right Tibial (Gastroc)  32.9 °C      33.60  <2.0     EMG     Side Muscle Nerve Root Ins Act Fibs Psw Recrt Duration Amp Poly Comment   Right 1stDorInt Ulnar C8-T1 Nml Nml Nml Reduced Nml Nml Nml    Right ExtIndicis Radial (Post Int) C7-8 Nml Nml Nml Nml Nml Nml Nml    Right Biceps Musculocut C5-6 Incr Nml Nml Reduced Nml Incr 1+ crd x 1   Right Triceps Radial C6-7-8 Nml Nml Nml Nml Nml Nml Nml    Right Deltoid Axillary C5-6 Nml Nml Nml Reduced Nml Incr 2+    Right Lower Cerv Parasp Rami C7,T1 Nml Nml Nml Nml Nml Nml Nml    Right PostTibialis Tibial L5, S1 Nml Nml Nml Nml Nml Nml Nml    Right MedGastroc Tibial S1-2 Nml Nml 1+ Reduced Nml Decr Nml    Right AntTibialis Dp Br Peron L4-5 Nml Nml Nml Nml Nml Nml Nml    Right VastusMed Femoral L2-4 Nml Nml Nml Nml Nml Nml Nml    Right GluteusMax InfGluteal L5-S2 Nml Nml Nml Reduced Nml Incr 2+    Right Lower Lumb Parasp Rami L5,S1 Nml Nml Nml Nml Nml Nml Nml      Waveforms:

## 2022-03-19 PROBLEM — I73.9 PERIPHERAL VASCULAR DISEASE (HCC): Status: ACTIVE | Noted: 2021-10-23

## 2022-03-19 PROBLEM — N17.9 ACUTE KIDNEY INJURY SUPERIMPOSED ON CKD (HCC): Status: ACTIVE | Noted: 2021-10-12

## 2022-03-19 PROBLEM — T56.891A LITHIUM TOXICITY: Status: ACTIVE | Noted: 2021-10-14

## 2022-03-19 PROBLEM — N18.9 ACUTE KIDNEY INJURY SUPERIMPOSED ON CKD (HCC): Status: ACTIVE | Noted: 2021-10-12

## 2022-03-19 PROBLEM — I87.303 CHRONIC VENOUS HYPERTENSION INVOLVING BOTH SIDES: Status: ACTIVE | Noted: 2021-10-23

## 2022-03-20 PROBLEM — R53.1 GENERALIZED WEAKNESS: Status: ACTIVE | Noted: 2021-10-12

## 2022-03-20 PROBLEM — R29.898 WEAKNESS OF BOTH LEGS: Status: ACTIVE | Noted: 2021-10-23

## 2022-03-22 ENCOUNTER — OFFICE VISIT (OUTPATIENT)
Dept: NEUROLOGY | Age: 67
End: 2022-03-22
Payer: MEDICARE

## 2022-03-22 VITALS
BODY MASS INDEX: 26.06 KG/M2 | SYSTOLIC BLOOD PRESSURE: 128 MMHG | DIASTOLIC BLOOD PRESSURE: 78 MMHG | OXYGEN SATURATION: 98 % | WEIGHT: 166 LBS | HEART RATE: 88 BPM | RESPIRATION RATE: 16 BRPM | HEIGHT: 67 IN

## 2022-03-22 DIAGNOSIS — R29.898 WEAKNESS OF BOTH LEGS: ICD-10-CM

## 2022-03-22 DIAGNOSIS — G99.2 MYELOPATHY CONCURRENT WITH AND DUE TO SPINAL STENOSIS OF CERVICAL REGION (HCC): Primary | ICD-10-CM

## 2022-03-22 DIAGNOSIS — M48.02 MYELOPATHY CONCURRENT WITH AND DUE TO SPINAL STENOSIS OF CERVICAL REGION (HCC): Primary | ICD-10-CM

## 2022-03-22 DIAGNOSIS — M54.12 RIGHT CERVICAL RADICULOPATHY: ICD-10-CM

## 2022-03-22 DIAGNOSIS — R26.9 GAIT DIFFICULTY: ICD-10-CM

## 2022-03-22 DIAGNOSIS — R26.1: ICD-10-CM

## 2022-03-22 PROCEDURE — 99214 OFFICE O/P EST MOD 30 MIN: CPT | Performed by: PSYCHIATRY & NEUROLOGY

## 2022-03-22 NOTE — LETTER
3/22/2022    Patient: Ernestine Paredes   YOB: 1955   Date of Visit: 3/22/2022     Rangel Siddiqui MD  [address] [facility] [address]  Via Fax: [phone]    Dear Rangel Siddiqui MD,      Thank you for referring Ms. Janki Sy to [address] [place] [address] for evaluation. My notes for this consultation are attached. If you have questions, please do not hesitate to call me. I look forward to following your patient along with you.       Sincerely,    Gilberto Veloz MD

## 2022-03-22 NOTE — PROGRESS NOTES
Nery Gonzalez (1955) is a 74 y.o. female, established patient, here for evaluation of the following     Chief complaint(s):   Chief Complaint   Patient presents with    Results     follow up for EMG results, accompanied by her        SUBJECTIVE/ OBJECTIVE:    HPI: 74 y.o. female       scheduled follow up visit on patient behalf due to having few more questions ). Pt has cervical myelopathy as evidenced by MRI C-spine (see my last clinic note 3-7-2022 for detailed discussion)    He asks whether any of her myelopathy symptoms are due to \"lithium toxicity\"    I told him/ patient No, it's due to the cervical stenosis squeezing her spinal cord    He asks if she will be able to walk again after having c-spine surgery. I told him/ patient that they would need to discuss surgical expectations with Spine Surgeon ( says they have not been contacted by the Orthopedic office yet, but he does have a copy of the referral I gave him last visit), but the from a Neurology standpoint, the goal of cervical spine surgery would be to alleviate the pressure on the spinal cord to 1) prevent the weakness/ symptoms from worsening and causing her permanent paralysis, and 2) to potentially improve her ability to walk and her urinary incontinence. No Known Allergies      Current Outpatient Medications:     cyanocobalamin 1,000 mcg tablet, Take 1 Tablet by mouth daily for 90 days. Low B12 level. Indications: prevention of vitamin B12 deficiency, Disp: 90 Tablet, Rfl: 0    lactulose (CHRONULAC) 10 gram/15 mL solution, TAKE 30 MILLILITERS BY MOUTH once daily, Disp: , Rfl:     cinacalcet (SENSIPAR) 30 mg tablet, Take 30 mg by mouth daily. , Disp: , Rfl:     amLODIPine (NORVASC) 5 mg tablet, Take 10 mg by mouth daily. , Disp: , Rfl:     OLANZapine (ZYPREXA) 20 mg tablet, Take 10 mg by mouth nightly.  (Patient not taking: Reported on 3/4/2022), Disp: , Rfl:      has a past medical history of Hypertension and Psychotic disorder ([facility] [address] has a past surgical history that includes hx other surgical and hx colonoscopy. Physical Exam:    Vitals:    03/22/22 1512   BP: 128/78   BP 1 Location: Left upper arm   Pulse: 88   Resp: 16   Height: 5' 7\" (1.702 m)   Weight: 75.3 kg (166 lb)   SpO2: 98%       No exam performed today      ========================================    ASSESSMENT/ PLAN:       ICD-10-CM ICD-9-CM    1. Myelopathy concurrent with and due to spinal stenosis of cervical region ([facility] [address]  M48.02 723.0     G99.2 336.3    2. Gait difficulty  R26.9 781.2    3. Weakness of both legs  R29.898 729.89    4. Right cervical radiculopathy  M54.12 723.4    5. Spastic gait determined by examination  R26.1 781. 2         Reprinted referral to Ortho-spine and advised  to call Orthopedic office today to set up the office visit to discuss surgical treatment of patient's cervical spinal stenosis/ cervical myelopathy    No routine neurology visit is needed          An electronic signature was used to authenticate this note.   -- Chinyere Castillo MD

## 2022-03-22 NOTE — PROGRESS NOTES
Chief Complaint   Patient presents with    Results     follow up for EMG results, accompanied by her      Visit Vitals  /78 (BP 1 Location: Left upper arm)   Pulse 88   Resp 16   Ht 5' 7\" (1.702 m)   Wt 75.3 kg (166 lb)   SpO2 98%   BMI 26.00 kg/m²

## 2022-03-29 ENCOUNTER — HOSPITAL ENCOUNTER (EMERGENCY)
Age: 67
Discharge: HOME | End: 2022-03-29
Attending: EMERGENCY MEDICINE
Payer: MEDICARE

## 2022-03-29 VITALS
OXYGEN SATURATION: 100 % | WEIGHT: 180 LBS | BODY MASS INDEX: 28.25 KG/M2 | TEMPERATURE: 99.1 F | HEIGHT: 67 IN | DIASTOLIC BLOOD PRESSURE: 70 MMHG | RESPIRATION RATE: 19 BRPM | SYSTOLIC BLOOD PRESSURE: 139 MMHG | HEART RATE: 92 BPM

## 2022-03-29 DIAGNOSIS — K02.9 PAIN DUE TO DENTAL CARIES: Primary | ICD-10-CM

## 2022-03-29 PROCEDURE — 99283 EMERGENCY DEPT VISIT LOW MDM: CPT

## 2022-03-29 PROCEDURE — 74011250637 HC RX REV CODE- 250/637: Performed by: EMERGENCY MEDICINE

## 2022-03-29 RX ORDER — IBUPROFEN 600 MG/1
600 TABLET ORAL
Status: COMPLETED | OUTPATIENT
Start: 2022-03-29 | End: 2022-03-29

## 2022-03-29 RX ORDER — IBUPROFEN 600 MG/1
600 TABLET ORAL
Qty: 20 TABLET | Refills: 0 | Status: SHIPPED | OUTPATIENT
Start: 2022-03-29 | End: 2022-04-20

## 2022-03-29 RX ORDER — CEPHALEXIN 500 MG/1
500 CAPSULE ORAL 4 TIMES DAILY
Qty: 28 CAPSULE | Refills: 0 | Status: SHIPPED | OUTPATIENT
Start: 2022-03-29 | End: 2022-04-05

## 2022-03-29 RX ORDER — CEPHALEXIN 250 MG/1
500 CAPSULE ORAL
Status: COMPLETED | OUTPATIENT
Start: 2022-03-29 | End: 2022-03-29

## 2022-03-29 RX ADMIN — IBUPROFEN 600 MG: 600 TABLET ORAL at 10:24

## 2022-03-29 RX ADMIN — CEPHALEXIN 500 MG: 250 CAPSULE ORAL at 10:25

## 2022-03-29 NOTE — ED PROVIDER NOTES
EMERGENCY DEPARTMENT HISTORY AND PHYSICAL EXAM      Date: 3/29/2022  Patient Name: Shikha Hayward    History of Presenting Illness     Chief Complaint   Patient presents with    Dental Pain       History Provided By: Patient    HPI: Shikha Hayward, 74 y.o. female with a past medical history significant hypertension presents to the ED with cc of right lower jaw pain for 1 day but patient not complaining of any pain currently patient states she had pain in her right lower jaw yesterday it has resolved, today patient presents with right jaw swelling    There are no other complaints, changes, or physical findings at this time. PCP: Cortney Jerez MD    No current facility-administered medications on file prior to encounter. Current Outpatient Medications on File Prior to Encounter   Medication Sig Dispense Refill    cyanocobalamin 1,000 mcg tablet Take 1 Tablet by mouth daily for 90 days. Low B12 level. Indications: prevention of vitamin B12 deficiency 90 Tablet 0    lactulose (CHRONULAC) 10 gram/15 mL solution TAKE 30 MILLILITERS BY MOUTH once daily      cinacalcet (SENSIPAR) 30 mg tablet Take 30 mg by mouth daily.  amLODIPine (NORVASC) 5 mg tablet Take 10 mg by mouth daily.  OLANZapine (ZYPREXA) 20 mg tablet Take 10 mg by mouth nightly.  (Patient not taking: Reported on 3/4/2022)         Past History     Past Medical History:  Past Medical History:   Diagnosis Date    Hypertension     Psychotic disorder ([address]     Schizophrenia       Past Surgical History:  Past Surgical History:   Procedure Laterality Date    HX COLONOSCOPY      HX OTHER SURGICAL      Ingrown toenail removal       Family History:  Family History   Problem Relation Age of Onset    No Known Problems Mother     No Known Problems Father        Social History:  Social History     Tobacco Use    Smoking status: Current Every Day Smoker     Packs/day: 0.50     Years: 45.00     Pack years: 22.50    Smokeless tobacco: Never Used   Vaping Use    Vaping Use: Never used   Substance Use Topics    Alcohol use: Not Currently    Drug use: Never       Allergies:  No Known Allergies      Review of Systems     Review of Systems   Constitutional: Negative for chills and fever. HENT: Positive for dental problem and facial swelling. Negative for rhinorrhea and sore throat. Eyes: Negative for pain and visual disturbance. Respiratory: Negative for cough and shortness of breath. Cardiovascular: Negative for chest pain and leg swelling. Gastrointestinal: Negative for abdominal pain and vomiting. Endocrine: Negative for polydipsia and polyuria. Genitourinary: Negative for dysuria and hematuria. Musculoskeletal: Negative for back pain and neck pain. Skin: Negative for color change and pallor. Neurological: Negative for weakness and headaches. Psychiatric/Behavioral: Negative for agitation and suicidal ideas. Physical Exam     Physical Exam  Vitals and nursing note reviewed. Constitutional:       General: She is not in acute distress. Appearance: She is not ill-appearing, toxic-appearing or diaphoretic. HENT:      Head: Normocephalic and atraumatic. Jaw: Tenderness and swelling present. Right Ear: Tympanic membrane normal.      Left Ear: Tympanic membrane normal.      Nose: Nose normal. No congestion. Mouth/Throat:      Mouth: Mucous membranes are moist.      Dentition: Abnormal dentition. Dental tenderness and dental caries present. No dental abscesses or gum lesions. Pharynx: Oropharynx is clear. Uvula midline. Eyes:      Extraocular Movements: Extraocular movements intact. Conjunctiva/sclera: Conjunctivae normal.      Pupils: Pupils are equal, round, and reactive to light. Cardiovascular:      Rate and Rhythm: Normal rate and regular rhythm. Pulses: Normal pulses. Heart sounds: Normal heart sounds.    Pulmonary:      Effort: Pulmonary effort is normal.      Breath sounds: Normal breath sounds. Abdominal:      General: Bowel sounds are normal.      Palpations: Abdomen is soft. Tenderness: There is no abdominal tenderness. Musculoskeletal:         General: No tenderness, deformity or signs of injury. Normal range of motion. Cervical back: Normal range of motion and neck supple. No rigidity or tenderness. Lymphadenopathy:      Cervical: No cervical adenopathy. Skin:     General: Skin is warm and dry. Capillary Refill: Capillary refill takes less than 2 seconds. Findings: No rash. Neurological:      General: No focal deficit present. Mental Status: She is alert and oriented to person, place, and time. Cranial Nerves: No cranial nerve deficit. Sensory: No sensory deficit. Psychiatric:         Mood and Affect: Mood normal.         Behavior: Behavior normal.         Lab and Diagnostic Study Results     Labs -   No results found for this or any previous visit (from the past 12 hour(s)). Radiologic Studies -   @lastxrresult@  CT Results  (Last 48 hours)    None        CXR Results  (Last 48 hours)    None            Medical Decision Making   - I am the first provider for this patient. - I reviewed the vital signs, available nursing notes, past medical history, past surgical history, family history and social history. - Initial assessment performed. The patients presenting problems have been discussed, and they are in agreement with the care plan formulated and outlined with them. I have encouraged them to ask questions as they arise throughout their visit. Vital Signs-Reviewed the patient's vital signs.   Patient Vitals for the past 12 hrs:   Temp Pulse Resp BP SpO2   03/29/22 0927 99.1 °F (37.3 °C) (!) 113 20 134/76 99 %       Records Reviewed: Nursing Notes    The patient presents with dental pain with a differential diagnosis of dental abscess, dental brandon, avulsed tooth      ED Course:          Provider Notes (Medical Decision Making): MDM       Procedures   Medical Decision Makingedical Decision Making  Performed by: Franny Fink MD  PROCEDURES:  Procedures       Disposition   Disposition: Condition stable and improved  DC- Adult Discharges: All of the diagnostic tests were reviewed and questions answered. Diagnosis, care plan and treatment options were discussed. The patient understands the instructions and will follow up as directed. The patients results have been reviewed with them. They have been counseled regarding their diagnosis. The patient verbally convey understanding and agreement of the signs, symptoms, diagnosis, treatment and prognosis and additionally agrees to follow up as recommended with their PCP in 24 - 48 hours. They also agree with the care-plan and convey that all of their questions have been answered. I have also put together some discharge instructions for them that include: 1) educational information regarding their diagnosis, 2) how to care for their diagnosis at home, as well a 3) list of reasons why they would want to return to the ED prior to their follow-up appointment, should their condition change. Discharged    DISCHARGE PLAN:  1. Current Discharge Medication List      START taking these medications    Details   cephALEXin (Keflex) 500 mg capsule Take 1 Capsule by mouth four (4) times daily for 7 days. Qty: 28 Capsule, Refills: 0      ibuprofen (MOTRIN) 600 mg tablet Take 1 Tablet by mouth every six (6) hours as needed for Pain. Qty: 20 Tablet, Refills: 0         CONTINUE these medications which have NOT CHANGED    Details   cyanocobalamin 1,000 mcg tablet Take 1 Tablet by mouth daily for 90 days. Low B12 level.   Indications: prevention of vitamin B12 deficiency  Qty: 90 Tablet, Refills: 0    Associated Diagnoses: Low serum vitamin B12      lactulose (CHRONULAC) 10 gram/15 mL solution TAKE 30 MILLILITERS BY MOUTH once daily      cinacalcet (SENSIPAR) 30 mg tablet Take 30 mg by mouth daily. amLODIPine (NORVASC) 5 mg tablet Take 10 mg by mouth daily. OLANZapine (ZYPREXA) 20 mg tablet Take 10 mg by mouth nightly. Associated Diagnoses: Senile osteoporosis; Hypercalcemia; Multinodular goiter           2. Follow-up Information     Follow up With Specialties Details Why Contact Info    Bryan Flores DDS  Schedule an appointment as soon as possible for a visit   Dental practice          4. Return to ED if worse   4. Current Discharge Medication List      START taking these medications    Details   cephALEXin (Keflex) 500 mg capsule Take 1 Capsule by mouth four (4) times daily for 7 days. Qty: 28 Capsule, Refills: 0  Start date: 3/29/2022, End date: 4/5/2022      ibuprofen (MOTRIN) 600 mg tablet Take 1 Tablet by mouth every six (6) hours as needed for Pain. Qty: 20 Tablet, Refills: 0  Start date: 3/29/2022               Diagnosis     Clinical Impression:   1. Pain due to dental caries        Attestations:    Valerio Egan MD    Please note that this dictation was completed with [employer], the [employer] voice recognition software. Quite often unanticipated grammatical, syntax, homophones, and other interpretive errors are inadvertently transcribed by the computer software. Please disregard these errors. Please excuse any errors that have escaped final proofreading. Thank you.

## 2022-04-05 ENCOUNTER — OFFICE VISIT (OUTPATIENT)
Dept: ORTHOPEDIC SURGERY | Age: 67
End: 2022-04-05

## 2022-04-05 VITALS — BODY MASS INDEX: 28.25 KG/M2 | WEIGHT: 180 LBS | HEIGHT: 67 IN

## 2022-04-05 DIAGNOSIS — M48.02 CERVICAL STENOSIS OF SPINAL CANAL: ICD-10-CM

## 2022-04-05 DIAGNOSIS — M50.90 CERVICAL DISC DISEASE: ICD-10-CM

## 2022-04-05 DIAGNOSIS — G89.29 CHRONIC NECK PAIN: Primary | ICD-10-CM

## 2022-04-05 DIAGNOSIS — M54.2 CHRONIC NECK PAIN: Primary | ICD-10-CM

## 2022-04-05 PROCEDURE — 99204 OFFICE O/P NEW MOD 45 MIN: CPT | Performed by: ORTHOPAEDIC SURGERY

## 2022-04-05 NOTE — PROGRESS NOTES
1. Have you been to the ER, urgent care clinic since your last visit? Hospitalized since your last visit? No    2. Have you seen or consulted any other health care providers outside of the [address] [place] [address] since your last visit? Include any pap smears or colon screening.  No    Chief Complaint   Patient presents with    Neck Pain     Cervical and Thoracic MRIs 1/26/22

## 2022-04-05 NOTE — LETTER
4/5/2022    Patient: Jocy Vargas   YOB: 1955   Date of Visit: 4/5/2022     Efren Jurado MD  [address] [facility] [address]  Via Fax: [phone]     MD Issa [facility] [address]  [address]  [address] [place] [address]    Dear MD Dhaval Copeland MD,      Thank you for referring Ms. Janki Sy to [facility] for evaluation. My notes for this consultation are attached. If you have questions, please do not hesitate to call me. I look forward to following your patient along with you.       Sincerely,    Alison Sanchez MD

## 2022-04-05 NOTE — PATIENT INSTRUCTIONS
Cervical Spinal Fusion: Before Your Surgery  What is cervical spinal fusion? Cervical spinal fusion is surgery that joins two or more of the vertebrae in your neck. When these bones are joined together, it's called fusion. After the joints are fused, they can no longer move. During the surgery, the doctor uses bone to make a \"bridge\" between your vertebrae. This bridge may be strengthened with metal plates and screws. In most cases, the doctor uses bone from another part of your body or bone that has been donated to a bone bank. But sometimes artificial bone is used. To do the surgery, the doctor makes a cut in either the front or the back of your neck. The cut is called an incision. It leaves a scar that fades with time. After surgery, you will stay in the hospital for a few days. Your neck will feel stiff or sore. You will get medicine to help with pain. Most people can go back to work after 4 to 6 weeks. But it may take a few months to get back to your usual activities. Follow-up care is a key part of your treatment and safety. Be sure to make and go to all appointments, and call your doctor if you are having problems. It's also a good idea to know your test results and keep a list of the medicines you take. How do you prepare for surgery? Surgery can be stressful. This information will help you understand what you can expect. And it will help you safely prepare for surgery. Preparing for surgery    · Be sure you have someone to take you home. Anesthesia and pain medicine will make it unsafe for you to drive or get home on your own.     · Understand exactly what surgery is planned, along with the risks, benefits, and other options.     · If you take aspirin or some other blood thinner, ask your doctor if you should stop taking it before your surgery. Make sure that you understand exactly what your doctor wants you to do.  These medicines increase the risk of bleeding.     · Tell your doctor ALL the medicines, vitamins, supplements, and herbal remedies you take. Some may increase the risk of problems during your surgery. Your doctor will tell you if you should stop taking any of them before the surgery and how soon to do it.     · Make sure your doctor and the hospital have a copy of your advance directive. If you don't have one, you may want to prepare one. It lets others know your health care wishes. It's a good thing to have before any type of surgery or procedure. What happens on the day of surgery? · Follow the instructions exactly about when to stop eating and drinking. If you don't, your surgery may be canceled. If your doctor told you to take your medicines on the day of surgery, take them with only a sip of water.     · Take a bath or shower before you come in for your surgery. Do not apply lotions, perfumes, deodorants, or nail polish.     · Do not shave the surgical site yourself.     · Take off all jewelry and piercings. And take out contact lenses, if you wear them. At the hospital or surgery center   · Bring a picture ID.     · The area for surgery is often marked to make sure there are no errors.     · You will be kept comfortable and safe by your anesthesia provider. You will be asleep during the surgery.     · The surgery usually takes 2 to 4 hours. If more than two vertebrae are being fused together, it will take longer.     · When you wake up, you will be lying on your back. You will have a soft or hard collar around your neck. This will protect and support your neck. It will also keep you from turning your head.     · You may have a small plastic tube coming out of your incision. This is to drain fluids. It's usually taken out in 1 or 2 days. When should you call your doctor?    · You have questions or concerns.     · You do not understand how to prepare for your surgery.     · You become ill before surgery (such as fever, flu, or a cold).     · You need to reschedule or have changed your mind about having the surgery. Where can you learn more? Go to http://www.gray.com/  Enter N543 in the search box to learn more about \"Cervical Spinal Fusion: Before Your Surgery. \"  Current as of: July 1, 2021               Content Version: 13.2  © 1925-1701 [employer], [employer]. Care instructions adapted under license by [employer] (which disclaims liability or warranty for this information). If you have questions about a medical condition or this instruction, always ask your healthcare professional. [address] any warranty or liability for your use of this information.

## 2022-04-05 NOTE — PROGRESS NOTES
Reina Prieto (: 1955) is a 74 y.o. female, patient, here for evaluation of the following chief complaint(s):  Neck Pain (Cervical and Thoracic MRIs 22)       ASSESSMENT/PLAN:    Below is the assessment and plan developed based on review of pertinent history, physical exam, labs, studies, and medications. At this point she has severe cervical stenosis at C3-4 and C4-5. She has cord changes. She has developed significant myelopathy in the last couple of months. She is a candidate for a ACDF at C3-4 and C4-5 and possibly C5-6. Risk benefits were discussed with her. She would like to proceed. The risks and benefits were discussed at length with the patient and the patient has elected to proceed. Indications for surgery include failed conservative treatment. Alternative treatments, risks and the perioperative course were discussed with the patient. All questions were answered. The risks and benefits of the procedure were explained. Benefits include definitive diagnosis, relief of pain, elimination of deformity and improved function. Risks of surgery including bleeding, infection, weakness, numbness, CSF leak, failure to improve symptoms, exacerbation of medical co-morbidities and even death were discussed with the patient. 1. Chronic neck pain  -     XR SPINE CERV 4 OR 5 V; Future  2. Cervical disc disease  3. Cervical stenosis of spinal canal      No follow-ups on file. SUBJECTIVE/OBJECTIVE:  Reina Prieto (: 1955) is a 74 y.o. female. No flowsheet data found. The patient comes in today on referral for chronic neck pain and bilateral leg weakness. She has been struggling over the last several months with increasing difficulty with walking and standing. She is also having some bladder issues. Neurology work-up is revealed severe cord compression in the neck. She has to ambulate now with a rolling walker. She denies any other new bowel issues.   She been sent here for surgical consultation       Imaging:    XR Results (most recent):  Results from Appointment encounter on 04/05/22    XR SPINE CERV 4 OR 5 V    Narrative  AP and lateral flexion-extension cervical spine reviewed today. There is cervical spondylosis predominate C5-6 with protuberant anterior osteophyte formation. Moderate spondylosis C6-7. No fractures or lytic lesions           MRI Results (most recent):    I reviewed the most recent MRI of her cervical spine. She has. Spondylosis C5-6. She has spondylosis C3-4 C4-5 with severe cord compression with cord changes particularly behind C4-5. No Known Allergies    Current Outpatient Medications   Medication Sig    cephALEXin (Keflex) 500 mg capsule Take 1 Capsule by mouth four (4) times daily for 7 days.  ibuprofen (MOTRIN) 600 mg tablet Take 1 Tablet by mouth every six (6) hours as needed for Pain.  cyanocobalamin 1,000 mcg tablet Take 1 Tablet by mouth daily for 90 days. Low B12 level. Indications: prevention of vitamin B12 deficiency    lactulose (CHRONULAC) 10 gram/15 mL solution TAKE 30 MILLILITERS BY MOUTH once daily    cinacalcet (SENSIPAR) 30 mg tablet Take 30 mg by mouth daily.  amLODIPine (NORVASC) 5 mg tablet Take 10 mg by mouth daily.  OLANZapine (ZYPREXA) 20 mg tablet Take 10 mg by mouth nightly. No current facility-administered medications for this visit.        Past Medical History:   Diagnosis Date    Hypertension     Psychotic disorder ([facility] [address]     Schizophrenia        Past Surgical History:   Procedure Laterality Date    HX COLONOSCOPY      HX OTHER SURGICAL      Ingrown toenail removal       Family History   Problem Relation Age of Onset    No Known Problems Mother     No Known Problems Father         Social History     Tobacco Use    Smoking status: Current Every Day Smoker     Packs/day: 0.50     Years: 45.00     Pack years: 22.50    Smokeless tobacco: Never Used   Vaping Use    Vaping Use: Never used Substance Use Topics    Alcohol use: Not Currently    Drug use: Never        Review of Systems       Vitals:  Ht 5' 7\" (1.702 m)   Wt 180 lb (81.6 kg)   BMI 28.19 kg/m²    Body mass index is 28.19 kg/m². Ortho Exam       Alert and [place]  x 3    Normal gait and station; normal posture    No assistive devices today. Lumbar spine:  Examination of the lumbar spine demonstrates no tenderness on palpation, no pain, no swelling or edema, with normal lumbar range of motion. Thoracic spine: Examination of the thoracic spine demonstrates no tenderness on palpation, no pain, no swelling or edema. Normal sensation and range of motion. Cervical spine:     Examination of the cervical spine demonstrates on inspection: No abnormal cutaneous markings. No step-offs noted. No previous surgical incisions. .    On palpation: Minimal tenderness    Range of motion: Normal range of motion although slow. Motor examination:  Right deltoid 5/5,  left deltoid 5/5, right bicep 5/5, left bicep 5/5, right wrist extensor 5/5/, left wrist extensor 5/5, right triceps 5/5, left triceps 5/5, right intrinsics 5/5, left intrinsics    Sensory examination: Reveals no deficits. Reflexes: Left bicep 2/2, left bicep 2/2, right triceps 2/2, left triceps 2/2    Functional testing: Spurling's exam is positive bilaterally; upper limb tension test is positive bilaterally    Barros's signs hyperreflexic and positive bilaterally. An electronic signature was used to authenticate this note.   -- Yeimi Eduardo MD

## 2022-04-18 ENCOUNTER — DOCUMENTATION ONLY (OUTPATIENT)
Dept: ORTHOPEDIC SURGERY | Age: 67
End: 2022-04-18

## 2022-04-18 ENCOUNTER — OFFICE VISIT (OUTPATIENT)
Dept: SURGERY | Age: 67
End: 2022-04-18
Payer: MEDICARE

## 2022-04-18 VITALS
WEIGHT: 163.2 LBS | BODY MASS INDEX: 25.62 KG/M2 | SYSTOLIC BLOOD PRESSURE: 160 MMHG | TEMPERATURE: 98 F | HEIGHT: 67 IN | DIASTOLIC BLOOD PRESSURE: 95 MMHG | RESPIRATION RATE: 12 BRPM | OXYGEN SATURATION: 98 % | HEART RATE: 81 BPM

## 2022-04-18 DIAGNOSIS — I87.303 CHRONIC VENOUS HYPERTENSION INVOLVING BOTH SIDES: Primary | ICD-10-CM

## 2022-04-18 DIAGNOSIS — R29.898 WEAKNESS OF BOTH LEGS: ICD-10-CM

## 2022-04-18 PROCEDURE — 99213 OFFICE O/P EST LOW 20 MIN: CPT | Performed by: SURGERY

## 2022-04-18 RX ORDER — LORATADINE 10 MG/1
1 TABLET ORAL DAILY
COMMUNITY
Start: 2022-04-13 | End: 2022-04-20

## 2022-04-18 NOTE — PROGRESS NOTES
Chief Complaint   Patient presents with    Follow-up     6 months f/u     Visit Vitals  BP (!) 160/95 (BP 1 Location: Left upper arm, BP Patient Position: Sitting, BP Cuff Size: Adult)   Pulse 81   Temp 98 °F (36.7 °C) (Temporal)   Resp 12   Ht 5' 7\" (1.702 m)   Wt 163 lb 3.2 oz (74 kg)   SpO2 98%   BMI 25.56 kg/m²     1. Have you been to the ER, urgent care clinic since your last visit? Hospitalized since your last visit? No    2. Have you seen or consulted any other health care providers outside of the [address] [place] [address] since your last visit? Include any pap smears or colon screening.   PCP, also sees an Urologist

## 2022-04-20 ENCOUNTER — HOSPITAL ENCOUNTER (OUTPATIENT)
Dept: PREADMISSION TESTING | Age: 67
Discharge: HOME | End: 2022-04-20
Payer: MEDICARE

## 2022-04-20 VITALS
DIASTOLIC BLOOD PRESSURE: 84 MMHG | WEIGHT: 162.92 LBS | HEIGHT: 67 IN | TEMPERATURE: 97.3 F | BODY MASS INDEX: 25.57 KG/M2 | HEART RATE: 94 BPM | SYSTOLIC BLOOD PRESSURE: 135 MMHG

## 2022-04-20 LAB
ANION GAP SERPL CALC-SCNC: 3 MMOL/L (ref 5–15)
APPEARANCE UR: CLEAR
ATRIAL RATE: 50 BPM
BACTERIA URNS QL MICRO: NEGATIVE /HPF
BILIRUB UR QL: NEGATIVE
BUN SERPL-MCNC: 16 MG/DL (ref 6–20)
BUN/CREAT SERPL: 12 (ref 12–20)
CALCIUM SERPL-MCNC: 10.3 MG/DL (ref 8.5–10.1)
CALCULATED P AXIS, ECG09: 73 DEGREES
CALCULATED R AXIS, ECG10: -3 DEGREES
CALCULATED T AXIS, ECG11: 37 DEGREES
CHLORIDE SERPL-SCNC: 111 MMOL/L (ref 97–108)
CO2 SERPL-SCNC: 30 MMOL/L (ref 21–32)
COLOR UR: NORMAL
CREAT SERPL-MCNC: 1.32 MG/DL (ref 0.55–1.02)
DIAGNOSIS, 93000: NORMAL
EPITH CASTS URNS QL MICRO: NORMAL /LPF
ERYTHROCYTE [DISTWIDTH] IN BLOOD BY AUTOMATED COUNT: 12.7 % (ref 11.5–14.5)
EST. AVERAGE GLUCOSE BLD GHB EST-MCNC: 97 MG/DL
GLUCOSE SERPL-MCNC: 78 MG/DL (ref 65–100)
GLUCOSE UR STRIP.AUTO-MCNC: NEGATIVE MG/DL
HBA1C MFR BLD: 5 % (ref 4–5.6)
HCT VFR BLD AUTO: 41.6 % (ref 35–47)
HGB BLD-MCNC: 13.2 G/DL (ref 11.5–16)
HGB UR QL STRIP: NEGATIVE
HYALINE CASTS URNS QL MICRO: NORMAL /LPF (ref 0–5)
INR PPP: 1 (ref 0.9–1.1)
KETONES UR QL STRIP.AUTO: NEGATIVE MG/DL
LEUKOCYTE ESTERASE UR QL STRIP.AUTO: NEGATIVE
MCH RBC QN AUTO: 30.4 PG (ref 26–34)
MCHC RBC AUTO-ENTMCNC: 31.7 G/DL (ref 30–36.5)
MCV RBC AUTO: 95.9 FL (ref 80–99)
NITRITE UR QL STRIP.AUTO: NEGATIVE
NRBC # BLD: 0 K/UL (ref 0–0.01)
NRBC BLD-RTO: 0 PER 100 WBC
P-R INTERVAL, ECG05: 190 MS
PH UR STRIP: 6 [PH] (ref 5–8)
PLATELET # BLD AUTO: 225 K/UL (ref 150–400)
PMV BLD AUTO: 11.8 FL (ref 8.9–12.9)
POTASSIUM SERPL-SCNC: 4.1 MMOL/L (ref 3.5–5.1)
PROT UR STRIP-MCNC: NEGATIVE MG/DL
PROTHROMBIN TIME: 10.1 SEC (ref 9–11.1)
Q-T INTERVAL, ECG07: 462 MS
QRS DURATION, ECG06: 84 MS
QTC CALCULATION (BEZET), ECG08: 421 MS
RBC # BLD AUTO: 4.34 M/UL (ref 3.8–5.2)
RBC #/AREA URNS HPF: NORMAL /HPF (ref 0–5)
SODIUM SERPL-SCNC: 144 MMOL/L (ref 136–145)
SP GR UR REFRACTOMETRY: <1.005 (ref 1–1.03)
UA: UC IF INDICATED,UAUC: NORMAL
UROBILINOGEN UR QL STRIP.AUTO: 0.2 EU/DL (ref 0.2–1)
VENTRICULAR RATE, ECG03: 50 BPM
WBC # BLD AUTO: 4.3 K/UL (ref 3.6–11)
WBC URNS QL MICRO: NORMAL /HPF (ref 0–4)

## 2022-04-20 PROCEDURE — 93005 ELECTROCARDIOGRAM TRACING: CPT

## 2022-04-20 PROCEDURE — 36415 COLL VENOUS BLD VENIPUNCTURE: CPT

## 2022-04-20 PROCEDURE — 85027 COMPLETE CBC AUTOMATED: CPT

## 2022-04-20 PROCEDURE — 86900 BLOOD TYPING SEROLOGIC ABO: CPT

## 2022-04-20 PROCEDURE — 80048 BASIC METABOLIC PNL TOTAL CA: CPT

## 2022-04-20 PROCEDURE — 83036 HEMOGLOBIN GLYCOSYLATED A1C: CPT

## 2022-04-20 PROCEDURE — 81001 URINALYSIS AUTO W/SCOPE: CPT

## 2022-04-20 PROCEDURE — 85610 PROTHROMBIN TIME: CPT

## 2022-04-20 NOTE — PERIOP NOTES
[address] [facility] INSTRUCTIONS  ORTHOPAEDIC    Surgery Date:   4/27/22    [facility] staff will call you between 4 PM- 8 PM the day before surgery with your arrival time. If your surgery is on a Monday, we will call you the preceding Friday. Please call [phone] after 8 PM if you did not receive your arrival time. 1. Please report to [facility] Patient Access/Admitting on the 1st floor. Bring your insurance card, photo identification, and any copayment (if applicable). 2. If you are going home the same day of your surgery, you must have a responsible adult to drive you home. You need to have a responsible adult to stay with you the first 24 hours after surgery and you should not drive a car for 24 hours following your surgery. 3. Do NOT eat any solid foods after midnight the night before surgery including candy, mints or gum. You may drink clear liquids from midnight until 1 hour prior to arrival time. You may drink up to 12 ounces at one time every 4 hours. 4. Do NOT drink alcohol or smoke 24 hours before surgery. STOP smoking for 14 days prior as it helps with breathing and healing after surgery. 5. If your arrival time is 3pm or later, you may eat a light breakfast before 8am (toast, bagel-no butter, black coffee, plain tea, fruit juice-no pulp) Please note special instructions, if applicable, below for medications. 6. If you are being admitted to the hospital,please leave personal belongings/luggage in your car until you have an assigned hospital room number. 7. Please wear comfortable clothes. Wear your glasses instead of contacts. We ask that all money, jewelry and valuables be left at home. Wear no make up, particularly mascara, the day of surgery. 8.  All body piercings, rings, and jewelry need to be removed and left at home. Please remove any nail polish or artificial nails from your fingernails. Please wear your hair loose or down.  Please no pony-tails, buns, or any metal hair accessories. If you shower the morning of surgery, please do not apply any lotions or powders afterwards. You may wear deodorant. Do not shave any body area within 24 hours of your surgery. 9. Please follow all instructions to avoid any potential surgical cancellation. 10. Should your physical condition change, (i.e. fever, cold, flu, etc.) please notify your surgeon as soon as possible. 11. It is important to be on time. If a situation occurs where you may be delayed, please call:  [phone] / [phone] on the day of surgery. 12. The Preadmission Testing staff can be reached at [phone]. 13. Special instructions: BRING WALKER DAY OF SURGERY    Current Outpatient Medications   Medication Sig    cyanocobalamin 1,000 mcg tablet Take 1 Tablet by mouth daily for 90 days. Low B12 level. Indications: prevention of vitamin B12 deficiency    lactulose (CHRONULAC) 10 gram/15 mL solution TAKE 30 MILLILITERS BY MOUTH once daily    cinacalcet (SENSIPAR) 30 mg tablet Take 30 mg by mouth daily.  amLODIPine (NORVASC) 5 mg tablet Take 10 mg by mouth daily.  OLANZapine (ZYPREXA) 20 mg tablet Take 10 mg by mouth nightly. No current facility-administered medications for this encounter. 1. YOU MUST ONLY TAKE THESE MEDICATIONS THE MORNING OF SURGERY WITH A SIP OF WATER: CINACALCET (SENSIPAR), AMLODIPIINE, OLANZAPINE  2. MEDICATIONS TO TAKE THE MORNING OF SURGERY ONLY IF NEEDED: NONE  3. HOLD these prescription medications BEFORE Surgery: NONE  4. Ask your surgeon/prescribing physician about when/if to STOP taking these medications: NONE  5. Stop any non-steroidal anti-inflammatory drugs (i.e. Ibuprofen, Naproxen, Advil, Aleve) 3 days before surgery. You may take Tylenol. STOP all vitamins and herbal supplements 1 week prior to  surgery.   6. If you are currently taking Plavix, Coumadin, or any other blood-thinning/anticoagulant medication contact your prescribing physician for instructions. Preventing Infections Before and After - Your Surgery    IMPORTANT INSTRUCTIONS    You play an important role in your health and preparation for surgery. To reduce the germs on your skin you will need to shower with CHG soap (Chorhexidine gluconate 4%) two times before surgery. CHG soap (Hibiclens, Hex-A-Clens or store brand)   CHG soap will be provided at your Preadmission Testing (PAT) appointment.  If you do not have a PAT appointment before surgery, you may arrange to  CHG soap from our office or purchase CHG soap at a pharmacy, grocery or department store.  You need to purchase TWO 4 ounce bottles to use for your 2 showers. Steps to follow:  1. Wash your hair with your normal shampoo and your body with regular soap and rinse well to remove shampoo and soap from your skin. 2. Wet a clean washcloth and turn off the shower. 3. Put CHG soap on washcloth and apply to your entire body from the neck down. Do not use on your head, face or private parts(genitals). Do not use CHG soap on open sores, wounds or areas of skin irritation. 4. Wash you body gently for 5 minutes. Do not wash your skin too hard. This soap does not create lather. Pay special attention to your underarms and from your belly button to your feet. 5. Turn the shower back on and rinse well to get CHG soap off your body. 6. Pat your skin dry with a clean, dry towel. Do not apply lotions or moisturizer. 7. Put on clean clothes and sleep on fresh bed sheets and do not allow pets to sleep with you. Shower with CHG soap 2 times before your surgery   The evening before your surgery   The morning of your surgery      Tips to help prevent infections after your surgery:  1. Protect your surgical wound from germs:  ? Hand washing is the most important thing you and your caregivers can do to prevent infections. ? Keep your bandage clean and dry! ? Do not touch your surgical wound.   2. Use clean, freshly washed towels and washcloths every time you shower; do not share bath linens with others. 3. Until your surgical wound is healed, wear clothing and sleep on bed linens each day that are clean and freshly washed. 4. Do not allow pets to sleep in your bed with you or touch your surgical wound. 5. Do not smoke - smoking delays wound healing. This may be a good time to stop smoking. 6. If you have diabetes, it is important for you to manage your blood sugar levels properly before your surgery as well as after your surgery. Poorly managed blood sugar levels slow down wound healing and prevent you from healing completely. Prevention of Infection  Testing for Staphylococcus aureus on your skin before surgery    Staphylococcus aureus (staph) is a common bacteria that is found on the body. It normally does not cause infection on healthy skin. Before surgery, you will be tested to see if you have staph by swabbing the inside of your nose. When you have an incision with surgery, the goal is to protect that incision from infection. Removal of the staph bacteria before surgery can decrease the risk of a surgical site infection. If your nose swab is positive for staph you will be called. Your treatment will include 2 steps:   Prescription for Mupirocin ointment to be used in each nostril twice a day for 5 days.  Showering with Chlorhexidine (CHG) liquid soap for 5 days prior to surgery. How to use Mupirocin ointment in your nose  1.  the prescription from your pharmacy. You will receive a large tube of ointment which will be big enough for all of your treatments. You will apply this ointment to each nostril 2 times a day for 5 days. 2. Wash your hands with  gel or soap and water for 20 seconds before using ointment. 3. Place a pea-sized amount of ointment on a cotton Q-tip. 4. Apply ointment just inside of each nostril with the Q-tip. Do not push Q-tip or ointment deep inside you nose.   5. Press your nostrils together and massage for a few seconds. 6. Wash your hands with  gel or soap and water after you are finished. 7. Do not get ointment near your eyes. If it gets into your eyes, rinse them with cool water. 8. If you need to use nasal spray, clean the tip of the bottle with alcohol before use and do not use both at the same time. 9. If you are scheduled for COVID testing during the 5 days, do NOT apply morning dose until after the COVID test has been performed. How to use Chlorhexidine (CHG) 4% liquid soap  1. Purchase an 8 ounce bottle of CHG liquid soap (Chlorhexidine 4%, Hibiclens, Hex-A-Clens or store brand) at a pharmacy or grocery store. 2. Wash your hair with your normal shampoo and your body with regular soap and rinse well to remove shampoo and soap from your skin. 3. Wet a clean washcloth and turn off the shower. 4. Put CHG soap on washcloth and apply to your entire body from the neck down. Do not use on your head, face or private parts(genitals). Do not use CHG soap on open sores, wounds or areas of skin irritation. 5. Wash your body gently for 5 minutes. Do not wash your skin too hard. This soap does not create lather. Pay special attention to your underarms and from your belly button to your feet. 6. Turn the shower back on and rinse well to get CHG soap off your body. 7. Pat your skin dry with a clean, dry towel. Do not apply lotions or moisturizer. 8. Put on clean clothes and sleep on fresh bed sheets the night before surgery. Do not allow pets to sleep with you. Eating and Drinking Before Surgery     You may eat a regular dinner at the usual time on the day before your surgery.  Do NOT eat any solid foods after midnight unless your arrival time at the hospital is 3pm or later.  You may drink clear liquids only from 12 midnight until 1 hours prior to your arrival time at the hospital on the day of your surgery. Do NOT drink alcohol.    Clear liquids include:  o Water  o Fruit juices without pulp( i.e. apple juice)  o Carbonated beverages  o Black coffee (no cream/milk)  o Tea (no cream/milk)  o Gatorade   You may drink up to 12-16 ounces at one time every 4 hours between the hours of midnight and 1 hour before your arrival time at the hospital. Example- if your arrival time at the hospital is 6am, you may drink 12-16 ounces of clear liquids no later than 5am.   If your arrival time at the hospital is 3pm or later, you may eat a light breakfast before 8am.   A light breakfast includes:  o Toast or bagel (no butter)  o Black coffee (no cream/milk)  o Tea (no cream/milk)  o Fruit juices without pulp ( i.e. apple juice)  o Do NOT eat meat, eggs, vegetables or fruit   If you have any questions, please contact your surgeon's office. Patient Information Regarding COVID Restrictions    Day of Procedure     Please park in the parking deck or any designated visitor parking lot.  Enter the facility through the [facility] the [facility].   On the day of surgery, please provide the cell phone number of the person who will be waiting for you to the Patient Access representative at the time of registration.  Please wear a mask on the day of your procedure.  We are now allowing two designated visitors per stay. Pediatric patients may have 2 designated visitors. These two people may come in with you on the day of your procedure.  No visitors under the age of 13.  The designated visitor must also wear a mask.  Once your procedure and the immediate recovery period is completed, a nurse in the recovery area will contact your designated visitor to inform them of your room number or to otherwise review other pertinent information regarding your care.  Social distancing practices are to be adhered to in waiting areas and the cafeteria. The patient was contacted in person.    She verbalized understanding of all instructions does not  need reinforcement.

## 2022-04-21 LAB
BACTERIA SPEC CULT: NORMAL
BACTERIA SPEC CULT: NORMAL
SERVICE CMNT-IMP: NORMAL

## 2022-04-21 NOTE — PERIOP NOTES
VOICE MESSAGE LEFT FOR PATIENT TO CALL BACK REGARDING BLOOD TRANSFUSION HISTORY. EKG REVIEWED BY KATHY HERMAN NP; NO FURTHER REVIEW NEEDED.

## 2022-04-21 NOTE — PROGRESS NOTES
VASCULAR FOLLOW UP      Subjective:   CHIEF COMPLAINTS:  Leg weakness  PRESENTATION OF ILLNESS:  Janki Saldana is a 74 y.o. very pleasant woman is here today follow-up with her son. She is here today for 6-month follow-up. Her leg weakness has improved. She is ambulate better. She is wearing compression stocking as instructed. Her swelling is also improved as well. Her blood pressure has been high according to patient's son. Patient denies any chest pain shortness of breath. Past Medical History:   Diagnosis Date    Hypertension     Psychiatric disorder     ANXIETY    Psychotic disorder ([facility] [address]     Schizophrenia    Schizoaffective disorder, depressive type ([facility] [address]     Thyroid disease       Past Surgical History:   Procedure Laterality Date    HX COLONOSCOPY      HX ORTHOPAEDIC Right     CORN REMOVAL PER PT     HX OTHER SURGICAL      Ingrown toenail removal     Family History   Problem Relation Age of Onset    No Known Problems Mother     No Known Problems Father     Anesth Problems Neg Hx       Social History     Tobacco Use    Smoking status: Current Every Day Smoker     Packs/day: 0.50     Years: 45.00     Pack years: 22.50    Smokeless tobacco: Never Used   Substance Use Topics    Alcohol use: Not Currently       Prior to Admission medications    Medication Sig Start Date End Date Taking? Authorizing Provider   cyanocobalamin 1,000 mcg tablet Take 1 Tablet by mouth daily for 90 days. Low B12 level. Indications: prevention of vitamin B12 deficiency 3/7/22 6/5/22 Yes Michaelle Licea MD   lactulose (CHRONULAC) 10 gram/15 mL solution TAKE 30 MILLILITERS BY MOUTH once daily 11/30/21  Yes Provider, Historical   cinacalcet (SENSIPAR) 30 mg tablet Take 30 mg by mouth daily. Yes Provider, Historical   amLODIPine (NORVASC) 5 mg tablet Take 10 mg by mouth daily. Yes Provider, Historical   OLANZapine (ZYPREXA) 20 mg tablet Take 10 mg by mouth nightly.  5/14/17  Yes Provider, Historical     No Known Allergies     Review of Systems:  I reviewed the rest of organ systems personally and they were negative signed by Dr. Faviola Martin    Objective:     Visit Vitals  BP (!) 160/95 (BP 1 Location: Left upper arm, BP Patient Position: Sitting, BP Cuff Size: Adult)   Pulse 81   Temp 98 °F (36.7 °C) (Temporal)   Resp 12   Ht 5' 7\" (1.702 m)   Wt 163 lb 3.2 oz (74 kg)   SpO2 98%   BMI 25.56 kg/m²     VITAL SIGNS REVIEWED. Physical Exam:  Patient is well-nourished pleasant in conversation is appropriate. Head and neck examination atraumatic, normocephalic. Gaze appropriate. Conversation appropriate. Neck examination shows supple. No mass. No obvious carotid bruit. Chest examination shows lungs are clear bilaterally well-expanded, no crackles or wheezes. Cardiovascular system regular rate, no obvious murmur. Skin warm to touch  and moist, no skin lesions. Abdomen is soft ,not tender or distended bowel sounds present. No palpable mass. Neurological examinations, no focal neuro deficits moving all 4 extremities. Cranial nerves intact. Sensation is intact as well. Hematologic: No obvious bruise or swelling or obvious lymphadenopathy. Psychosocial: Appropriate. Has good effect. Musculoskeletal system: No muscle wasting, appropriate movements upper and lower extremity. Vascular examination: Vascular examination shows she has a CEAP C3 venous disorder. Mild spider nevi noted no varicose vein. Patient has palpable pedal pulse noted. Data Review:   No visits with results within 1 Month(s) from this visit.    Latest known visit with results is:   Hospital Outpatient Visit on 01/19/2022   Component Date Value Ref Range Status    Vitamin B12 01/19/2022 263  193 - 986 pg/mL Final    Folate 01/19/2022 13.4  5.0 - 21.0 ng/mL Final    CK 01/19/2022 63.73  26 - 192 U/L Final    Test Description: 01/25/2022 Acetylcholine Receptor Ab, All Code: 971293   Final    Reference Lab: 01/25/2022 Acetylcholine Receptor Ab, All CODE: [id number]   Final    Results: 01/25/2022 SEE [facility] REPORT IN PATIENTS CHART   Final        Assessment:     Problem List Items Addressed This Visit        Circulatory    Chronic venous hypertension involving both sides - Primary       Nervous    Weakness of both legs              Plan:     Patient was advised to continue her compression stocking with current pressure gradient of 20 to 30 mmHg. I have suggested family that make blood pressure log at home. If persistently high blood pressure medication is to be adjusted with a primary care physician. Patient is doing much better in terms of leg weakness. Patient was advised continue to be active. I will reassess her again comprehensive venous examination in 8-month follow-up.         Jeovanny Frias MD

## 2022-04-22 ENCOUNTER — TRANSCRIBE ORDER (OUTPATIENT)
Dept: SCHEDULING | Age: 67
End: 2022-04-22

## 2022-04-22 DIAGNOSIS — Z12.31 VISIT FOR SCREENING MAMMOGRAM: Primary | ICD-10-CM

## 2022-04-22 LAB
ABO + RH BLD: NORMAL
BLOOD GROUP ANTIBODIES SERPL: NORMAL
SPECIMEN EXP DATE BLD: NORMAL

## 2022-04-22 NOTE — PERIOP NOTES
Left message on voicemail for pt to return call. Need blood transfusion history. No blood transfusion in last 3 month per pt's spouse.

## 2022-04-27 ENCOUNTER — ANESTHESIA EVENT (OUTPATIENT)
Dept: SURGERY | Age: 67
End: 2022-04-27
Payer: MEDICARE

## 2022-04-27 ENCOUNTER — APPOINTMENT (OUTPATIENT)
Dept: GENERAL RADIOLOGY | Age: 67
End: 2022-04-27
Attending: ORTHOPAEDIC SURGERY
Payer: MEDICARE

## 2022-04-27 ENCOUNTER — ANESTHESIA (OUTPATIENT)
Dept: SURGERY | Age: 67
End: 2022-04-27
Payer: MEDICARE

## 2022-04-27 ENCOUNTER — HOSPITAL ENCOUNTER (OUTPATIENT)
Age: 67
Discharge: NURSING, REHAB OR LONG-TERM CARE | End: 2022-04-29
Attending: ORTHOPAEDIC SURGERY | Admitting: ORTHOPAEDIC SURGERY
Payer: MEDICARE

## 2022-04-27 DIAGNOSIS — Z98.1 S/P CERVICAL SPINAL FUSION: Primary | ICD-10-CM

## 2022-04-27 PROBLEM — M48.02 CERVICAL STENOSIS OF SPINE: Status: ACTIVE | Noted: 2022-04-27

## 2022-04-27 LAB
GLUCOSE BLD STRIP.AUTO-MCNC: 93 MG/DL (ref 65–117)
SERVICE CMNT-IMP: NORMAL

## 2022-04-27 PROCEDURE — 77030029099 HC BN WAX SSPC -A: Performed by: ORTHOPAEDIC SURGERY

## 2022-04-27 PROCEDURE — C1713 ANCHOR/SCREW BN/BN,TIS/BN: HCPCS | Performed by: ORTHOPAEDIC SURGERY

## 2022-04-27 PROCEDURE — 2709999900 HC NON-CHARGEABLE SUPPLY: Performed by: ORTHOPAEDIC SURGERY

## 2022-04-27 PROCEDURE — C1889 IMPLANT/INSERT DEVICE, NOC: HCPCS | Performed by: ORTHOPAEDIC SURGERY

## 2022-04-27 PROCEDURE — 77030038600 HC TU BPLR IRR DISP STRY -B: Performed by: ORTHOPAEDIC SURGERY

## 2022-04-27 PROCEDURE — 74011250637 HC RX REV CODE- 250/637: Performed by: STUDENT IN AN ORGANIZED HEALTH CARE EDUCATION/TRAINING PROGRAM

## 2022-04-27 PROCEDURE — 74011000250 HC RX REV CODE- 250: Performed by: STUDENT IN AN ORGANIZED HEALTH CARE EDUCATION/TRAINING PROGRAM

## 2022-04-27 PROCEDURE — 74011000250 HC RX REV CODE- 250: Performed by: NURSE ANESTHETIST, CERTIFIED REGISTERED

## 2022-04-27 PROCEDURE — 77030026438 HC STYL ET INTUB CARD -A: Performed by: ANESTHESIOLOGY

## 2022-04-27 PROCEDURE — 74011250636 HC RX REV CODE- 250/636: Performed by: PHYSICIAN ASSISTANT

## 2022-04-27 PROCEDURE — 74011250637 HC RX REV CODE- 250/637: Performed by: PHYSICIAN ASSISTANT

## 2022-04-27 PROCEDURE — 76210000006 HC OR PH I REC 0.5 TO 1 HR: Performed by: ORTHOPAEDIC SURGERY

## 2022-04-27 PROCEDURE — 76010000171 HC OR TIME 2 TO 2.5 HR INTENSV-TIER 1: Performed by: ORTHOPAEDIC SURGERY

## 2022-04-27 PROCEDURE — 77030003832 HC BIT DRL ATLNTS MEDT -B: Performed by: ORTHOPAEDIC SURGERY

## 2022-04-27 PROCEDURE — 74011000250 HC RX REV CODE- 250: Performed by: PHYSICIAN ASSISTANT

## 2022-04-27 PROCEDURE — 82962 GLUCOSE BLOOD TEST: CPT

## 2022-04-27 PROCEDURE — 77030008684 HC TU ET CUF COVD -B: Performed by: ANESTHESIOLOGY

## 2022-04-27 PROCEDURE — 74011250636 HC RX REV CODE- 250/636: Performed by: NURSE ANESTHETIST, CERTIFIED REGISTERED

## 2022-04-27 PROCEDURE — 76060000035 HC ANESTHESIA 2 TO 2.5 HR: Performed by: ORTHOPAEDIC SURGERY

## 2022-04-27 PROCEDURE — 74011000250 HC RX REV CODE- 250: Performed by: ORTHOPAEDIC SURGERY

## 2022-04-27 PROCEDURE — L0120 CERV FLEX N/ADJ FOAM PRE OTS: HCPCS | Performed by: ORTHOPAEDIC SURGERY

## 2022-04-27 PROCEDURE — 77030004391 HC BUR FLUT MEDT -C: Performed by: ORTHOPAEDIC SURGERY

## 2022-04-27 PROCEDURE — 77030040506 HC DRN WND MDII -A: Performed by: ORTHOPAEDIC SURGERY

## 2022-04-27 PROCEDURE — 74011250636 HC RX REV CODE- 250/636: Performed by: STUDENT IN AN ORGANIZED HEALTH CARE EDUCATION/TRAINING PROGRAM

## 2022-04-27 DEVICE — CAGE 5030741 ANATOMIC PTC 14X11X7MM
Type: IMPLANTABLE DEVICE | Site: SPINE CERVICAL | Status: FUNCTIONAL
Brand: ANATOMIC PEEK PTC CERVICAL FUSION SYSTEM

## 2022-04-27 DEVICE — SCREW 7713515 ZEVO VAR SD 3.5MM X 15MM
Type: IMPLANTABLE DEVICE | Site: SPINE CERVICAL | Status: FUNCTIONAL
Brand: ZEVO™ ANTERIOR CERVICAL PLATE SYSTEM

## 2022-04-27 DEVICE — GRAFT BNE SUB SM CANC FRZN MORSELIZED W/ VIABLE CELL: Type: IMPLANTABLE DEVICE | Site: SPINE CERVICAL | Status: FUNCTIONAL

## 2022-04-27 DEVICE — I-FACTOR PUTTY, 1.0CC SYRINGE
Type: IMPLANTABLE DEVICE | Site: SPINE CERVICAL | Status: FUNCTIONAL
Brand: I-FACTOR PEPTIDE ENHANCED BONE GRAFT

## 2022-04-27 DEVICE — GRAFT HUM TISS 3X4 CM WND COVERING AMNIO MEMBRN VERSASHIELD: Type: IMPLANTABLE DEVICE | Site: SPINE CERVICAL | Status: FUNCTIONAL

## 2022-04-27 RX ORDER — ROCURONIUM BROMIDE 10 MG/ML
INJECTION, SOLUTION INTRAVENOUS AS NEEDED
Status: DISCONTINUED | OUTPATIENT
Start: 2022-04-27 | End: 2022-04-27 | Stop reason: HOSPADM

## 2022-04-27 RX ORDER — DEXMEDETOMIDINE HYDROCHLORIDE 100 UG/ML
INJECTION, SOLUTION INTRAVENOUS AS NEEDED
Status: DISCONTINUED | OUTPATIENT
Start: 2022-04-27 | End: 2022-04-27 | Stop reason: HOSPADM

## 2022-04-27 RX ORDER — MIDAZOLAM HYDROCHLORIDE 1 MG/ML
1 INJECTION, SOLUTION INTRAMUSCULAR; INTRAVENOUS AS NEEDED
Status: DISCONTINUED | OUTPATIENT
Start: 2022-04-27 | End: 2022-04-27 | Stop reason: HOSPADM

## 2022-04-27 RX ORDER — AMLODIPINE BESYLATE 5 MG/1
10 TABLET ORAL DAILY
Status: DISCONTINUED | OUTPATIENT
Start: 2022-04-28 | End: 2022-04-29 | Stop reason: HOSPADM

## 2022-04-27 RX ORDER — SODIUM CHLORIDE 0.9 % (FLUSH) 0.9 %
5-40 SYRINGE (ML) INJECTION AS NEEDED
Status: DISCONTINUED | OUTPATIENT
Start: 2022-04-27 | End: 2022-04-27 | Stop reason: HOSPADM

## 2022-04-27 RX ORDER — MIDAZOLAM HYDROCHLORIDE 1 MG/ML
INJECTION, SOLUTION INTRAMUSCULAR; INTRAVENOUS AS NEEDED
Status: DISCONTINUED | OUTPATIENT
Start: 2022-04-27 | End: 2022-04-27 | Stop reason: HOSPADM

## 2022-04-27 RX ORDER — NALOXONE HYDROCHLORIDE 0.4 MG/ML
0.4 INJECTION, SOLUTION INTRAMUSCULAR; INTRAVENOUS; SUBCUTANEOUS AS NEEDED
Status: DISCONTINUED | OUTPATIENT
Start: 2022-04-27 | End: 2022-04-29 | Stop reason: HOSPADM

## 2022-04-27 RX ORDER — SODIUM CHLORIDE, SODIUM LACTATE, POTASSIUM CHLORIDE, CALCIUM CHLORIDE 600; 310; 30; 20 MG/100ML; MG/100ML; MG/100ML; MG/100ML
INJECTION, SOLUTION INTRAVENOUS
Status: DISCONTINUED | OUTPATIENT
Start: 2022-04-27 | End: 2022-04-27 | Stop reason: HOSPADM

## 2022-04-27 RX ORDER — SODIUM CHLORIDE 0.9 % (FLUSH) 0.9 %
5-40 SYRINGE (ML) INJECTION EVERY 8 HOURS
Status: DISCONTINUED | OUTPATIENT
Start: 2022-04-27 | End: 2022-04-29 | Stop reason: HOSPADM

## 2022-04-27 RX ORDER — MIDAZOLAM HYDROCHLORIDE 1 MG/ML
0.5 INJECTION, SOLUTION INTRAMUSCULAR; INTRAVENOUS
Status: DISCONTINUED | OUTPATIENT
Start: 2022-04-27 | End: 2022-04-27 | Stop reason: HOSPADM

## 2022-04-27 RX ORDER — KETAMINE HYDROCHLORIDE 10 MG/ML
INJECTION, SOLUTION INTRAMUSCULAR; INTRAVENOUS AS NEEDED
Status: DISCONTINUED | OUTPATIENT
Start: 2022-04-27 | End: 2022-04-27 | Stop reason: HOSPADM

## 2022-04-27 RX ORDER — FENTANYL CITRATE 50 UG/ML
25 INJECTION, SOLUTION INTRAMUSCULAR; INTRAVENOUS
Status: DISCONTINUED | OUTPATIENT
Start: 2022-04-27 | End: 2022-04-27 | Stop reason: HOSPADM

## 2022-04-27 RX ORDER — FENTANYL CITRATE 50 UG/ML
INJECTION, SOLUTION INTRAMUSCULAR; INTRAVENOUS AS NEEDED
Status: DISCONTINUED | OUTPATIENT
Start: 2022-04-27 | End: 2022-04-27 | Stop reason: HOSPADM

## 2022-04-27 RX ORDER — SODIUM CHLORIDE 0.9 % (FLUSH) 0.9 %
5-40 SYRINGE (ML) INJECTION EVERY 8 HOURS
Status: DISCONTINUED | OUTPATIENT
Start: 2022-04-27 | End: 2022-04-27 | Stop reason: HOSPADM

## 2022-04-27 RX ORDER — SODIUM CHLORIDE 9 MG/ML
1000 INJECTION, SOLUTION INTRAVENOUS CONTINUOUS
Status: DISCONTINUED | OUTPATIENT
Start: 2022-04-27 | End: 2022-04-27 | Stop reason: HOSPADM

## 2022-04-27 RX ORDER — MORPHINE SULFATE 2 MG/ML
2 INJECTION, SOLUTION INTRAMUSCULAR; INTRAVENOUS
Status: ACTIVE | OUTPATIENT
Start: 2022-04-27 | End: 2022-04-28

## 2022-04-27 RX ORDER — OLANZAPINE 5 MG/1
10 TABLET ORAL
Status: DISCONTINUED | OUTPATIENT
Start: 2022-04-27 | End: 2022-04-28

## 2022-04-27 RX ORDER — SODIUM CHLORIDE 9 MG/ML
125 INJECTION, SOLUTION INTRAVENOUS CONTINUOUS
Status: DISPENSED | OUTPATIENT
Start: 2022-04-27 | End: 2022-04-28

## 2022-04-27 RX ORDER — OXYCODONE AND ACETAMINOPHEN 5; 325 MG/1; MG/1
1 TABLET ORAL AS NEEDED
Status: DISCONTINUED | OUTPATIENT
Start: 2022-04-27 | End: 2022-04-27 | Stop reason: HOSPADM

## 2022-04-27 RX ORDER — SODIUM CHLORIDE 0.9 % (FLUSH) 0.9 %
5-40 SYRINGE (ML) INJECTION AS NEEDED
Status: DISCONTINUED | OUTPATIENT
Start: 2022-04-27 | End: 2022-04-29 | Stop reason: HOSPADM

## 2022-04-27 RX ORDER — CYCLOBENZAPRINE HCL 10 MG
10 TABLET ORAL
Status: DISCONTINUED | OUTPATIENT
Start: 2022-04-27 | End: 2022-04-29 | Stop reason: HOSPADM

## 2022-04-27 RX ORDER — FACIAL-BODY WIPES
10 EACH TOPICAL DAILY PRN
Status: DISCONTINUED | OUTPATIENT
Start: 2022-04-29 | End: 2022-04-29 | Stop reason: HOSPADM

## 2022-04-27 RX ORDER — PROPOFOL 10 MG/ML
INJECTION, EMULSION INTRAVENOUS
Status: DISCONTINUED | OUTPATIENT
Start: 2022-04-27 | End: 2022-04-27 | Stop reason: HOSPADM

## 2022-04-27 RX ORDER — DIPHENHYDRAMINE HYDROCHLORIDE 50 MG/ML
12.5 INJECTION, SOLUTION INTRAMUSCULAR; INTRAVENOUS
Status: ACTIVE | OUTPATIENT
Start: 2022-04-27 | End: 2022-04-28

## 2022-04-27 RX ORDER — DEXAMETHASONE SODIUM PHOSPHATE 4 MG/ML
INJECTION, SOLUTION INTRA-ARTICULAR; INTRALESIONAL; INTRAMUSCULAR; INTRAVENOUS; SOFT TISSUE AS NEEDED
Status: DISCONTINUED | OUTPATIENT
Start: 2022-04-27 | End: 2022-04-27 | Stop reason: HOSPADM

## 2022-04-27 RX ORDER — SODIUM CHLORIDE 9 MG/ML
50 INJECTION, SOLUTION INTRAVENOUS CONTINUOUS
Status: DISCONTINUED | OUTPATIENT
Start: 2022-04-27 | End: 2022-04-27 | Stop reason: HOSPADM

## 2022-04-27 RX ORDER — ONDANSETRON 2 MG/ML
INJECTION INTRAMUSCULAR; INTRAVENOUS AS NEEDED
Status: DISCONTINUED | OUTPATIENT
Start: 2022-04-27 | End: 2022-04-27 | Stop reason: HOSPADM

## 2022-04-27 RX ORDER — SODIUM CHLORIDE, SODIUM LACTATE, POTASSIUM CHLORIDE, CALCIUM CHLORIDE 600; 310; 30; 20 MG/100ML; MG/100ML; MG/100ML; MG/100ML
125 INJECTION, SOLUTION INTRAVENOUS CONTINUOUS
Status: DISCONTINUED | OUTPATIENT
Start: 2022-04-27 | End: 2022-04-27 | Stop reason: HOSPADM

## 2022-04-27 RX ORDER — DIPHENHYDRAMINE HYDROCHLORIDE 50 MG/ML
12.5 INJECTION, SOLUTION INTRAMUSCULAR; INTRAVENOUS AS NEEDED
Status: DISCONTINUED | OUTPATIENT
Start: 2022-04-27 | End: 2022-04-27 | Stop reason: HOSPADM

## 2022-04-27 RX ORDER — ONDANSETRON 2 MG/ML
4 INJECTION INTRAMUSCULAR; INTRAVENOUS
Status: ACTIVE | OUTPATIENT
Start: 2022-04-27 | End: 2022-04-28

## 2022-04-27 RX ORDER — OXYCODONE HYDROCHLORIDE 5 MG/1
5 TABLET ORAL
Status: DISCONTINUED | OUTPATIENT
Start: 2022-04-27 | End: 2022-04-29 | Stop reason: HOSPADM

## 2022-04-27 RX ORDER — POLYETHYLENE GLYCOL 3350 17 G/17G
17 POWDER, FOR SOLUTION ORAL DAILY
Status: DISCONTINUED | OUTPATIENT
Start: 2022-04-28 | End: 2022-04-29 | Stop reason: HOSPADM

## 2022-04-27 RX ORDER — LIDOCAINE HYDROCHLORIDE 20 MG/ML
INJECTION, SOLUTION EPIDURAL; INFILTRATION; INTRACAUDAL; PERINEURAL AS NEEDED
Status: DISCONTINUED | OUTPATIENT
Start: 2022-04-27 | End: 2022-04-27 | Stop reason: HOSPADM

## 2022-04-27 RX ORDER — AMOXICILLIN 250 MG
1 CAPSULE ORAL 2 TIMES DAILY
Status: DISCONTINUED | OUTPATIENT
Start: 2022-04-27 | End: 2022-04-29 | Stop reason: HOSPADM

## 2022-04-27 RX ORDER — SUCCINYLCHOLINE CHLORIDE 20 MG/ML
INJECTION INTRAMUSCULAR; INTRAVENOUS AS NEEDED
Status: DISCONTINUED | OUTPATIENT
Start: 2022-04-27 | End: 2022-04-27 | Stop reason: HOSPADM

## 2022-04-27 RX ORDER — DEXAMETHASONE SODIUM PHOSPHATE 4 MG/ML
10 INJECTION, SOLUTION INTRA-ARTICULAR; INTRALESIONAL; INTRAMUSCULAR; INTRAVENOUS; SOFT TISSUE EVERY 6 HOURS
Status: DISPENSED | OUTPATIENT
Start: 2022-04-27 | End: 2022-04-28

## 2022-04-27 RX ORDER — OXYCODONE HYDROCHLORIDE 5 MG/1
10 TABLET ORAL
Status: DISCONTINUED | OUTPATIENT
Start: 2022-04-27 | End: 2022-04-29 | Stop reason: HOSPADM

## 2022-04-27 RX ORDER — EPHEDRINE SULFATE/0.9% NACL/PF 50 MG/5 ML
SYRINGE (ML) INTRAVENOUS AS NEEDED
Status: DISCONTINUED | OUTPATIENT
Start: 2022-04-27 | End: 2022-04-27 | Stop reason: HOSPADM

## 2022-04-27 RX ORDER — ACETAMINOPHEN 325 MG/1
650 TABLET ORAL ONCE
Status: DISCONTINUED | OUTPATIENT
Start: 2022-04-27 | End: 2022-04-27 | Stop reason: HOSPADM

## 2022-04-27 RX ORDER — MORPHINE SULFATE 2 MG/ML
2 INJECTION, SOLUTION INTRAMUSCULAR; INTRAVENOUS
Status: DISCONTINUED | OUTPATIENT
Start: 2022-04-27 | End: 2022-04-27 | Stop reason: HOSPADM

## 2022-04-27 RX ORDER — HYDROMORPHONE HYDROCHLORIDE 1 MG/ML
0.2 INJECTION, SOLUTION INTRAMUSCULAR; INTRAVENOUS; SUBCUTANEOUS
Status: DISCONTINUED | OUTPATIENT
Start: 2022-04-27 | End: 2022-04-27 | Stop reason: HOSPADM

## 2022-04-27 RX ORDER — SODIUM CHLORIDE 9 MG/ML
INJECTION, SOLUTION INTRAVENOUS
Status: DISCONTINUED | OUTPATIENT
Start: 2022-04-27 | End: 2022-04-27 | Stop reason: HOSPADM

## 2022-04-27 RX ORDER — AMLODIPINE BESYLATE 5 MG/1
10 TABLET ORAL ONCE
Status: COMPLETED | OUTPATIENT
Start: 2022-04-27 | End: 2022-04-27

## 2022-04-27 RX ORDER — LIDOCAINE HYDROCHLORIDE 10 MG/ML
0.1 INJECTION, SOLUTION EPIDURAL; INFILTRATION; INTRACAUDAL; PERINEURAL AS NEEDED
Status: DISCONTINUED | OUTPATIENT
Start: 2022-04-27 | End: 2022-04-27 | Stop reason: HOSPADM

## 2022-04-27 RX ORDER — FENTANYL CITRATE 50 UG/ML
50 INJECTION, SOLUTION INTRAMUSCULAR; INTRAVENOUS AS NEEDED
Status: DISCONTINUED | OUTPATIENT
Start: 2022-04-27 | End: 2022-04-27 | Stop reason: HOSPADM

## 2022-04-27 RX ORDER — PROPOFOL 10 MG/ML
INJECTION, EMULSION INTRAVENOUS AS NEEDED
Status: DISCONTINUED | OUTPATIENT
Start: 2022-04-27 | End: 2022-04-27 | Stop reason: HOSPADM

## 2022-04-27 RX ORDER — ACETAMINOPHEN 500 MG
1000 TABLET ORAL EVERY 6 HOURS
Status: DISCONTINUED | OUTPATIENT
Start: 2022-04-27 | End: 2022-04-29 | Stop reason: HOSPADM

## 2022-04-27 RX ORDER — ONDANSETRON 2 MG/ML
4 INJECTION INTRAMUSCULAR; INTRAVENOUS AS NEEDED
Status: DISCONTINUED | OUTPATIENT
Start: 2022-04-27 | End: 2022-04-27 | Stop reason: HOSPADM

## 2022-04-27 RX ORDER — CINACALCET 30 MG/1
30 TABLET, FILM COATED ORAL DAILY
Status: DISCONTINUED | OUTPATIENT
Start: 2022-04-28 | End: 2022-04-29 | Stop reason: HOSPADM

## 2022-04-27 RX ORDER — EPHEDRINE SULFATE/0.9% NACL/PF 50 MG/5 ML
5 SYRINGE (ML) INTRAVENOUS AS NEEDED
Status: DISCONTINUED | OUTPATIENT
Start: 2022-04-27 | End: 2022-04-27 | Stop reason: HOSPADM

## 2022-04-27 RX ORDER — LANOLIN ALCOHOL/MO/W.PET/CERES
1000 CREAM (GRAM) TOPICAL DAILY
Status: DISCONTINUED | OUTPATIENT
Start: 2022-04-28 | End: 2022-04-29 | Stop reason: HOSPADM

## 2022-04-27 RX ADMIN — ONDANSETRON HYDROCHLORIDE 4 MG: 2 INJECTION, SOLUTION INTRAMUSCULAR; INTRAVENOUS at 10:35

## 2022-04-27 RX ADMIN — SODIUM CHLORIDE, POTASSIUM CHLORIDE, SODIUM LACTATE AND CALCIUM CHLORIDE: 600; 310; 30; 20 INJECTION, SOLUTION INTRAVENOUS at 08:27

## 2022-04-27 RX ADMIN — WATER 2 G: 1 INJECTION INTRAMUSCULAR; INTRAVENOUS; SUBCUTANEOUS at 17:28

## 2022-04-27 RX ADMIN — SODIUM CHLORIDE 125 ML/HR: 9 INJECTION, SOLUTION INTRAVENOUS at 11:06

## 2022-04-27 RX ADMIN — FENTANYL CITRATE 50 MCG: 50 INJECTION, SOLUTION INTRAMUSCULAR; INTRAVENOUS at 09:05

## 2022-04-27 RX ADMIN — MIDAZOLAM 2 MG: 1 INJECTION INTRAMUSCULAR; INTRAVENOUS at 08:51

## 2022-04-27 RX ADMIN — OXYCODONE 10 MG: 5 TABLET ORAL at 14:10

## 2022-04-27 RX ADMIN — PROPOFOL 125 MCG/KG/MIN: 10 INJECTION, EMULSION INTRAVENOUS at 09:00

## 2022-04-27 RX ADMIN — OLANZAPINE 10 MG: 5 TABLET, FILM COATED ORAL at 20:39

## 2022-04-27 RX ADMIN — Medication 10 MG: at 10:26

## 2022-04-27 RX ADMIN — LIDOCAINE HYDROCHLORIDE 70 MG: 20 INJECTION, SOLUTION EPIDURAL; INFILTRATION; INTRACAUDAL; PERINEURAL at 08:58

## 2022-04-27 RX ADMIN — DEXMEDETOMIDINE HYDROCHLORIDE 10 MCG: 100 INJECTION, SOLUTION, CONCENTRATE INTRAVENOUS at 10:34

## 2022-04-27 RX ADMIN — SODIUM CHLORIDE: 900 INJECTION, SOLUTION INTRAVENOUS at 10:49

## 2022-04-27 RX ADMIN — Medication 30 MG: at 08:58

## 2022-04-27 RX ADMIN — PROPOFOL 140 MG: 10 INJECTION, EMULSION INTRAVENOUS at 08:58

## 2022-04-27 RX ADMIN — SUCCINYLCHOLINE CHLORIDE 140 MG: 20 INJECTION, SOLUTION INTRAMUSCULAR; INTRAVENOUS at 08:59

## 2022-04-27 RX ADMIN — ROCURONIUM BROMIDE 5 MG: 10 SOLUTION INTRAVENOUS at 08:58

## 2022-04-27 RX ADMIN — DEXAMETHASONE SODIUM PHOSPHATE 8 MG: 4 INJECTION, SOLUTION INTRAMUSCULAR; INTRAVENOUS at 09:12

## 2022-04-27 RX ADMIN — SENNOSIDES AND DOCUSATE SODIUM 1 TABLET: 8.6; 5 TABLET ORAL at 17:28

## 2022-04-27 RX ADMIN — ACETAMINOPHEN 1000 MG: 500 TABLET ORAL at 14:10

## 2022-04-27 RX ADMIN — WATER 2 G: 1 INJECTION INTRAMUSCULAR; INTRAVENOUS; SUBCUTANEOUS at 09:18

## 2022-04-27 RX ADMIN — OXYCODONE 5 MG: 5 TABLET ORAL at 17:29

## 2022-04-27 RX ADMIN — DEXMEDETOMIDINE HYDROCHLORIDE 10 MCG: 100 INJECTION, SOLUTION, CONCENTRATE INTRAVENOUS at 10:42

## 2022-04-27 RX ADMIN — SODIUM CHLORIDE, PRESERVATIVE FREE 10 ML: 5 INJECTION INTRAVENOUS at 14:16

## 2022-04-27 RX ADMIN — ACETAMINOPHEN 1000 MG: 500 TABLET ORAL at 18:54

## 2022-04-27 RX ADMIN — DEXAMETHASONE SODIUM PHOSPHATE 10 MG: 4 INJECTION, SOLUTION INTRAMUSCULAR; INTRAVENOUS at 17:28

## 2022-04-27 RX ADMIN — AMLODIPINE BESYLATE 10 MG: 5 TABLET ORAL at 19:28

## 2022-04-27 RX ADMIN — FENTANYL CITRATE 50 MCG: 50 INJECTION, SOLUTION INTRAMUSCULAR; INTRAVENOUS at 08:58

## 2022-04-27 RX ADMIN — SENNOSIDES AND DOCUSATE SODIUM 1 TABLET: 8.6; 5 TABLET ORAL at 14:10

## 2022-04-27 NOTE — ROUTINE PROCESS
Patient assessed for readiness to ambulate. Vital Signs  Level of Consciousness: Alert (0)  Temp: 97.5 °F (36.4 °C)  Temp Source: Oral  Pulse (Heart Rate): 66  Heart Rate Source: Monitor  Cardiac Rhythm: Sinus Rhythm  Resp Rate: 24  BP: (!) 169/97  MAP (Calculated): 121  BP 1 Location: Right upper arm  BP 1 Method: Automatic  BP Patient Position: At rest  MEWS Score: 1. Patient ambulated with assistance of 2 nurses. Patient ambulated with gait belt and walker. Patient walked to Bedside commode. Patient returned safely to bed.

## 2022-04-27 NOTE — BRIEF OP NOTE
Brief Postoperative Note    Patient: Marky Gongora  YOB: 1955  MRN: [id number]    Date of Procedure: 4/27/2022     Pre-Op Diagnosis: CHRONIC NECK PAIN    Post-Op Diagnosis: Same as preoperative diagnosis. Procedure(s):  C3-5 ANTERIOR CERVICAL DISCECTOMY WITH FUSION    Surgeon(s):  Yoanna Leone MD    Surgical Assistant: Physician Assistant: TONY Mac    Anesthesia: General     Estimated Blood Loss (mL): Minimal    Complications: None    Specimens: * No specimens in log *     Implants:   Implant Name Type Inv. Item Serial No.  Lot No. LRB No. Used Action   GRAFT BNE SUB SM CANC FRZN MORSELIZED W/ VIABLE CELL - [id number]  GRAFT BNE SUB SM CANC FRZN MORSELIZED W/ VIABLE CELL [id number] [employer] [facility]_ NA N/A 1 Implanted   GRAFT HUM TISS 3X4 CM WND COVERING AMNIO MEMBRN [facility] - [id number]  GRAFT HUM TISS 3X4 CM WND COVERING [facility]IO [facility]BRN [facility] [id number] [employer]KELETAL [employer]ANS[employer] [facility] NA N/A 1 Implanted   PUTTY BNE GRFT 1 CC W/ SYR I FACTOR - SNA  PUTTY BNE GRFT 1 CC W/ SYR I FACTOR NA [employer]_ [id number] N/A 1 Implanted   CAGE SPNL [id number] CERV PEEK INTBDY FUS HUGH PTC - SNA  CAGE SPNL [id number] CERV PEEK INTBDY FUS HUGH PTC NA [employer]_WD 57MC N/A 1 Implanted   CAGE SPNL [id number] PEEK HUGH CERV FUS PTC - SN/A  CAGE SPNL [id number] PEEK HUGH CERV FUS PTC N/A [employer]WD 51MC N/A 1 Implanted   PLATE SPNL [id number] LEV 2 ANT CERV TI ZEVO - SN/A  PLATE SPNL [id number] LEV 2 ANT CERV TI ZEVO N/A [employer]WD N/A N/A 1 Implanted   SCREW SPNL L15MM DIA3. 5MM ANT CERV TI SELF DRL MARILEE ANG - SN/A  SCREW SPNL L15MM DIA3. 5MM ANT CERV TI SELF DRL MARILEE ANG N/A [employer]WD N/A N/A 6 Implanted       Drains: * No LDAs found *    Findings: DDD    Electronically Signed by TONY Ness on 4/27/2022 at 10:54 AM

## 2022-04-27 NOTE — PROGRESS NOTES
1206: TRANSFER - OUT REPORT:    Verbal report given to Amador CHURCHILL(name) on [facility]  being transferred to Critical access hospital(unit) for routine post - op       Report consisted of patients Situation, Background, Assessment and   Recommendations(SBAR). Time Pre op antibiotic given:0918  Anesthesia Stop time: 5434  Nunes Present on Transfer to floor:no  Order for Nunes on Chart:no  Discharge Prescriptions with Chart:no    Information from the following report(s) SBAR, Kardex, OR Summary, Intake/Output, MAR and Cardiac Rhythm SR was reviewed with the receiving nurse. Opportunity for questions and clarification was provided. Is the patient on 02? YES       L/Min 2      Is the patient on a monitor? NO    Is the nurse transporting with the patient? NO    Surgical Waiting Area notified of patient's transfer from PACU?  YES      The following personal items collected during your admission accompanied patient upon transfer:   Dental Appliance: Dental Appliances: None  Vision:    Hearing Aid:    Jewelry:    Clothing: Clothing: With patient  Other Valuables:    Valuables sent to safe:

## 2022-04-27 NOTE — PROGRESS NOTES
Problem: Falls - Risk of  Goal: *Absence of Falls  Description: Document Shannon Mcburney Fall Risk and appropriate interventions in the flowsheet.   Outcome: Progressing Towards Goal  Note: Fall Risk Interventions:            Medication Interventions: Patient to call before getting OOB

## 2022-04-27 NOTE — PROGRESS NOTES
Occupational Therapy   04/27/22     Orders received, chart review completed. Note patient POD #0 s/p ACDF. OT will follow up tomorrow for evaluation. Recommend OOB to chair three times a day for meals, self-completion of ADLs as able and medically stable.      Thank you,  Mingo Campa, OTCHIQUI, OTR/L

## 2022-04-27 NOTE — PROGRESS NOTES
Physical Therapy 4/27/2022    Orders received and chart reviewed up to date. Pt POD 0 ACDF. Pt not a fast track at this time. Will follow-up tomorrow for PT evaluation as appropriate. Recommend with nursing patient to complete as able in order to maintain strength, endurance and independence: OOB to chair 3x/day. Thank you.   Kim Mirza, PT, DPT

## 2022-04-27 NOTE — ANESTHESIA PREPROCEDURE EVALUATION
Relevant Problems   No relevant active problems       Anesthetic History   No history of anesthetic complications            Review of Systems / Medical History  Patient summary reviewed, nursing notes reviewed and pertinent labs reviewed    Pulmonary  Within defined limits                 Neuro/Psych   Within defined limits      Psychiatric history     Cardiovascular  Within defined limits  Hypertension                   GI/Hepatic/Renal  Within defined limits              Endo/Other  Within defined limits    Hypothyroidism       Other Findings              Physical Exam    Airway  Mallampati: II  TM Distance: > 6 cm  Neck ROM: normal range of motion   Mouth opening: Normal     Cardiovascular  Regular rate and rhythm,  S1 and S2 normal,  no murmur, click, rub, or gallop             Dental  No notable dental hx       Pulmonary  Breath sounds clear to auscultation               Abdominal  GI exam deferred       Other Findings            Anesthetic Plan    ASA: 2  Anesthesia type: general          Induction: Intravenous  Anesthetic plan and risks discussed with: Patient

## 2022-04-27 NOTE — PROGRESS NOTES
Spine Surgery Progress Note    Admit Date: 4/27/2022   LOS: 0 days      Daily Progress Note: 4/27/2022    POD:Day of Surgery    S/P: Procedure(s):  C3-5 ANTERIOR CERVICAL DISCECTOMY WITH FUSION    Visit Vitals  BP (!) 169/97 (BP 1 Location: Right upper arm, BP Patient Position: At rest)   Pulse 66   Temp 97.5 °F (36.4 °C)   Resp 24   Ht 5' 7\" (1.702 m)   Wt 73.5 kg (162 lb)   SpO2 100%   BMI 25.37 kg/m²      Lab Results   Component Value Date/Time    HGB 13.2 04/20/2022 03:47 PM    INR 1.0 04/20/2022 03:47 PM       Patient doing well overall. No nausea or vomiting. Pain well controlled on current medications. Still drowsy from surgery. Voiding without issue. Has not been out of bed. Denies nausea, vomiting, fever, chills, chest pain, dyspnea, headache. Calves soft/NTTP Bilaterally. Moving UE & LE well. Neurocirculatory exam WNL. Motor and sensation intact. Incision OK; no drainage. Dressing clean and dry. Plan:  -Pain control   -PT/OT; in soft collar  -ADAT  -Bowel regimen  -Cont home meds  -Daily dressing changes to incision  -CM consult for [facility]     Discharge pending. All questions were answered. Follow up in Dr. Isidro Loza office in 2 weeks, sooner if needed.     TONY Negrete

## 2022-04-27 NOTE — ANESTHESIA POSTPROCEDURE EVALUATION
Procedure(s):  C3-5 ANTERIOR CERVICAL DISCECTOMY WITH FUSION. general    Anesthesia Post Evaluation      Multimodal analgesia: multimodal analgesia used between 6 hours prior to anesthesia start to PACU discharge  Patient location during evaluation: PACU  Patient participation: complete - patient participated  Level of consciousness: awake  Pain score: 2  Pain management: adequate  Airway patency: patent  Anesthetic complications: no  Cardiovascular status: acceptable  Respiratory status: acceptable  Hydration status: acceptable  Comments: I have evaluated the patient and meets criteria for discharge from PACU. Liyah Thakur MD  Post anesthesia nausea and vomiting:  controlled  Final Post Anesthesia Temperature Assessment:  Normothermia (36.0-37.5 degrees C)      INITIAL Post-op Vital signs:   Vitals Value Taken Time   /59 04/27/22 1105   Temp 36.5 °C (97.7 °F) 04/27/22 1103   Pulse 86 04/27/22 1109   Resp 24 04/27/22 1109   SpO2 97 % 04/27/22 1109   Vitals shown include unvalidated device data.

## 2022-04-27 NOTE — OP NOTES
[facility]  OPERATIVE REPORT    Name:  Winston Henriquez  MR#:  [id number]  :  1955  ACCOUNT #:  [de-identified]  DATE OF SERVICE:  2022    PREOPERATIVE DIAGNOSIS:  Cervical stenosis with cervical myelopathy, C3-4 and C4-5. POSTOPERATIVE DIAGNOSIS:  Cervical stenosis with cervical myelopathy, C3-4 and C4-5. PROCEDURES PERFORMED:  Anterior cervical diskectomy C3-4 and C4-5, anterior interbody fusion C3-4 and C4-5. Anterior plating fixation C3, C4, and C5. SURGEON:  Jerome Pisano MD    ASSISTANT:  Faviola Frost PA-C    ANESTHESIA:  General.    COMPLICATIONS:  None. SPECIMENS REMOVED:  None. IMPLANTS:  Medtronic Zevo and Titan Interbody. ESTIMATED BLOOD LOSS:  Minimal.    INDICATIONS:  This is a pleasant 79-year-old female with worsening neck pain, bilateral arm arm, weakness in lower extremities with signs of myelopathy. She has cervical stenosis at C3-4 and C4-5 from broad based disk protrusions. Myelomalacia and cord changes noted. She is for an anterior cervical diskectomy for decompression and subsequent fusion. PROCEDURE:  The patient was identified, brought to the operative suite, underwent general anesthesia without difficulty. Preoperative neuromonitoring was placed and baselines obtained. They remained stable throughout the surgical procedure. Back was prepped and draped sterilely. Neck was in a neutral position with 10 pounds of traction. After neck was prepped and draped sterilely, time-out was performed. A transverse incision was made approximately at the level of the thyroid cartilage. Dissection was carried down to the platysma. This was split longitudinally. We gently dissected medial to the sternocleidomastoid down to the deep cervical fascia and identified the C3-4 and C4-5 levels. We took a lateral fluoroscopy image at which time we were able to identify the spinal needle at the C3-4 level.   Soft tissue was moved off the anterior disk and vertebral body of C4 and elevated the longus colli with deep radiolucent retractors. Annulotomy was performed. Starting first at the C3-4 level, complete diskectomy was performed. Pituitary rongeurs, curved curettes, and vincenzo were used to remove the remainder of the disk as well as cartilaginous endplates. Midas bur under loupe magnification was used to remove the bony osteophytes, particularly the posterior vertebral osteophytes. This allowed access to the posterior disk space. We resected the posterior vertebral osteophytes further with #1 and #2 Kerrisons. We carefully elevated the posterior longitudinal ligament which was calcified and causing impingement of the thecal sac and released this with release of the impingement of the thecal sac. At which time, we then did bilateral foraminotomies undercutting the uncinate process. We irrigated the wounds and placed our . We then moved to the C4-5 level and did a similar decompression at that level. We did annulotomy followed by complete diskectomy with pituitary rongeurs, curved curettes, and vincenzo after which time we were then able to elevate the posterior longitudinal ligament. Posterior vertebral osteophytes were then taken down with the Midas bur as well under loupe magnification. Central canal decompression was completed with full diskectomy and removal of the posterior longitudinal ligament. Bilateral foraminotomies were performed again undercutting the uncinate processes. We then did trial spacers for the Medtronic TCP interbody grafting system. We used a smaller footprint at C3 at 7 mm height and a larger    16 x 14 mm at C4-5. The endplates were rasped to lightly bleeding bone. The grafts were impacted with Michelle allograft as well as i-FACTOR. They were impacted in place with 2 mm countersinking. Each implant was impacted. Neuromonitoring was stable after each implant. Weight was removed off the head.   We contoured a Medtronic Zevo plate to the anterior cervical spine. Temporary fixation with threaded pin followed by placement of 3.5 x 15 mm screws into C3, C4, and C5 bilaterally. X-rays demonstrated stable fixation. Locking mechanism was engaged. The wound was thoroughly irrigated. FloSeal for hemostasis. VersaShield for anti-adhesion. 2-0 Vicryl on the fascial layer and 2-0 Vicryl on the skin. The physician assistant was present during the entire operative procedure and assisted in all critical elements of the surgery. No surgical assist or resident was available.      Sushila Kwon MD      [id number]  D:  04/27/2022 10:44  T:  04/27/2022 17:50  JOB #:  [id number]

## 2022-04-27 NOTE — PERIOP NOTES
Patient: Janis Zaman MRN: [id number]  SSN: [id number]   YOB: 1955  Age: 74 y.o. Sex: female     Patient is status post Procedure(s):  C3-5 ANTERIOR CERVICAL DISCECTOMY WITH FUSION. Surgeon(s) and Role: Sherrill Betancourt MD - Primary    Local/Dose/Irrigation:  No local given. Peripheral IV 04/27/22 Left;Posterior Hand (Active)            Airway - Endotracheal Tube 04/27/22 Oral (Active)               Dressing/Packing:  Incision 04/27/22 Neck anterior-Dressing/Treatment: Non-adherent;Steri-strips; Other (Comment) (telfa and medipore tape) (04/27/22 0900)  Soft cervical collar.

## 2022-04-27 NOTE — PROGRESS NOTES
Medicare Outpatient Observation Notice (MOON) provided to patient/representative with verbal explanation of the notice. Time allotted for questions regarding the notice. Patient /representative provided a completed copy of the MOON notice. Copy placed on bedside chart.   Jones Nelson, Care Management Assistant

## 2022-04-28 PROCEDURE — 77030038269 HC DRN EXT URIN PURWCK BARD -A

## 2022-04-28 PROCEDURE — 74011250637 HC RX REV CODE- 250/637: Performed by: PHYSICIAN ASSISTANT

## 2022-04-28 PROCEDURE — 97161 PT EVAL LOW COMPLEX 20 MIN: CPT

## 2022-04-28 PROCEDURE — 97165 OT EVAL LOW COMPLEX 30 MIN: CPT

## 2022-04-28 PROCEDURE — 74011250637 HC RX REV CODE- 250/637: Performed by: STUDENT IN AN ORGANIZED HEALTH CARE EDUCATION/TRAINING PROGRAM

## 2022-04-28 PROCEDURE — 74011250636 HC RX REV CODE- 250/636: Performed by: STUDENT IN AN ORGANIZED HEALTH CARE EDUCATION/TRAINING PROGRAM

## 2022-04-28 PROCEDURE — 97116 GAIT TRAINING THERAPY: CPT

## 2022-04-28 PROCEDURE — 97535 SELF CARE MNGMENT TRAINING: CPT

## 2022-04-28 PROCEDURE — 74011000250 HC RX REV CODE- 250: Performed by: STUDENT IN AN ORGANIZED HEALTH CARE EDUCATION/TRAINING PROGRAM

## 2022-04-28 PROCEDURE — 97530 THERAPEUTIC ACTIVITIES: CPT

## 2022-04-28 RX ORDER — IBUPROFEN 200 MG
1 TABLET ORAL DAILY
Status: DISCONTINUED | OUTPATIENT
Start: 2022-04-29 | End: 2022-04-29 | Stop reason: HOSPADM

## 2022-04-28 RX ORDER — OLANZAPINE 5 MG/1
5 TABLET ORAL
Status: DISCONTINUED | OUTPATIENT
Start: 2022-04-28 | End: 2022-04-29 | Stop reason: HOSPADM

## 2022-04-28 RX ADMIN — SODIUM CHLORIDE, PRESERVATIVE FREE 10 ML: 5 INJECTION INTRAVENOUS at 06:24

## 2022-04-28 RX ADMIN — DEXAMETHASONE SODIUM PHOSPHATE 10 MG: 4 INJECTION, SOLUTION INTRAMUSCULAR; INTRAVENOUS at 00:05

## 2022-04-28 RX ADMIN — SENNOSIDES AND DOCUSATE SODIUM 1 TABLET: 8.6; 5 TABLET ORAL at 08:19

## 2022-04-28 RX ADMIN — SODIUM CHLORIDE, PRESERVATIVE FREE 10 ML: 5 INJECTION INTRAVENOUS at 13:42

## 2022-04-28 RX ADMIN — POLYETHYLENE GLYCOL 3350 17 G: 17 POWDER, FOR SOLUTION ORAL at 08:19

## 2022-04-28 RX ADMIN — CYANOCOBALAMIN TAB 500 MCG 1000 MCG: 500 TAB at 08:19

## 2022-04-28 RX ADMIN — SENNOSIDES AND DOCUSATE SODIUM 1 TABLET: 8.6; 5 TABLET ORAL at 18:22

## 2022-04-28 RX ADMIN — WATER 2 G: 1 INJECTION INTRAMUSCULAR; INTRAVENOUS; SUBCUTANEOUS at 00:05

## 2022-04-28 RX ADMIN — ACETAMINOPHEN 1000 MG: 500 TABLET ORAL at 18:22

## 2022-04-28 RX ADMIN — OXYCODONE 5 MG: 5 TABLET ORAL at 23:00

## 2022-04-28 RX ADMIN — CINACALCET HYDROCHLORIDE 30 MG: 30 TABLET, FILM COATED ORAL at 08:18

## 2022-04-28 RX ADMIN — OLANZAPINE 5 MG: 5 TABLET, FILM COATED ORAL at 23:00

## 2022-04-28 RX ADMIN — AMLODIPINE BESYLATE 10 MG: 5 TABLET ORAL at 08:18

## 2022-04-28 RX ADMIN — SODIUM CHLORIDE, PRESERVATIVE FREE 10 ML: 5 INJECTION INTRAVENOUS at 23:00

## 2022-04-28 RX ADMIN — DEXAMETHASONE SODIUM PHOSPHATE 10 MG: 4 INJECTION, SOLUTION INTRAMUSCULAR; INTRAVENOUS at 06:24

## 2022-04-28 RX ADMIN — ACETAMINOPHEN 1000 MG: 500 TABLET ORAL at 13:41

## 2022-04-28 RX ADMIN — LACTULOSE 30 ML: 20 SOLUTION ORAL at 08:19

## 2022-04-28 NOTE — PROGRESS NOTES
ALEISHA: anticipate d/c to IPR, referrals pending to:    1st choice: [facility], Yes, willing to accept patient  Comments: Thanks! She looks like a great IPR candidate. We will watch for medical stability. Thanks! [facility] [phone]     2nd choice: [facility]  3rd choice: [facility]    Pt may need Covid test pending IPR policy    BLS Transport    RUR: n/a    Pt is s/p Cervical discectomy with fusion  Pt/ot recs. IPR  -1200-CM reviewed pt chart & met with pt at bedside to discuss transitional planning. Pt stated she resides with her spouse in [facility] and also sometimes stays with her sister in [place], [place]. Home has six steps to entrance. Prior to admission, pt stated she was independent with adls, dme is: BEAR, bsc, shower chair. Pt uses [facility] for meds with no copay concerns. CM confirmed pcp & demographics. CM discussed IPR choices and provided list. However, pt advised that CM speak with pt's spouse, Yariel Dyer [phone] in regards to choices. CM spoke with Mr. Ricarda Madrid and he informed that they he will be at hospital shortly to discuss this further. 1300-IPR choices obtained from pt/spouse. Referrals placed in allscripts/careport. Attending in agreement with d/c plan. CM to follow. Queta Jackson RN BSN [facility]  Transition of Care Plan:     The Plan for Transition of Care is related to the following treatment goals: IPR    The Patient and/or patient representative  was provided with a choice of provider and agrees  with the discharge plan. Yes [x] No []    A Freedom of choice list was provided with basic dialogue that supports the patient's individualized plan of care/goals and shares the quality data associated with the providers. Yes [x] No []  Care Management Interventions  PCP Verified by CM:  Yes  Palliative Care Criteria Met (RRAT>21 & CHF Dx)?: No  Mode of Transport at Discharge: S  Transition of Care Consult (CM Consult): Discharge Planning  MyChart Signup: No  Discharge Durable Medical Equipment: No  Health Maintenance Reviewed: Yes  Physical Therapy Consult: Yes  Occupational Therapy Consult: Yes  Support Systems: Spouse/Significant Other  Confirm Follow Up Transport:  (BLS)  Discharge Location  Patient Expects to be Discharged to[de-identified] Rehab hospital/unit acute

## 2022-04-28 NOTE — PROGRESS NOTES
Problem: Mobility Impaired (Adult and Pediatric)  Goal: *Acute Goals and Plan of Care (Insert Text)  Description: FUNCTIONAL STATUS PRIOR TO ADMISSION: Patient was modified independent using a rolling walker and SB quad cane for functional mobility. HOME SUPPORT PRIOR TO ADMISSION: The patient lived with her sister in-law but did not require assist.  assisted with med management, apts, and finances    Physical Therapy Goals  Initiated 4/28/2022  1. Patient will move from supine to sit and sit to supine  in bed with minimal assistance/contact guard assist within 7 day(s). 2.  Patient will transfer from bed to chair and chair to bed with supervision/set-up using the least restrictive device within 7 day(s). 3.  Patient will perform sit to stand with minimal assistance/contact guard assist within 7 day(s). 4.  Patient will ambulate with minimal assistance/contact guard assist for 100 feet with the least restrictive device within 7 day(s). 5.  Patient will ascend/descend 4 stairs with 1 handrail(s) with modified independence within 7 day(s). 4/28/2022 1627 by London Holguin, PT, DPT  Outcome: Progressing Towards Goal  4/28/2022 1144 by London Holguin, PT, DPT  Outcome: Progressing Towards Goal   PHYSICAL THERAPY TREATMENT  Patient: Pedro Buenrostro (51 y.o. female)  Date: 4/28/2022  Diagnosis: Cervical stenosis of spine [M48.02] <principal problem not specified>  Procedure(s) (LRB):  C3-5 ANTERIOR CERVICAL DISCECTOMY WITH FUSION (N/A) 1 Day Post-Op  Precautions:  No bending, no lifting greater than 5 lbs, no twisting, log-roll technique, repositioning every 20-30 min except when sleeping, brace when OOB (if ordered)  Chart, physical therapy assessment, plan of care and goals were reviewed. ASSESSMENT  Patient continues with skilled PT services and is progressing towards goals. Pain was controlled for the PM session and the patient is motivated for progress.  She remains limited by difficulty with transfers and multiple gait deviations. Gait training progressed to 28' using RW and requiring minimal assist. Gait with a narrow base of support, shuffling steps with poor clearance, and inconsistent step placement. Returned to bed post session after she remained seated for multiple hours this afternoon. Recommend discharge to IP rehab when medically ready d/t good rehab potential but also elevated fall risk and limited home support. Current Level of Function Impacting Discharge (mobility/balance): moderate assist sit to stand    Other factors to consider for discharge: ataxic gait, poor endurance, limited coordination         PLAN :  Patient continues to benefit from skilled intervention to address the above impairments. Continue treatment per established plan of care. to address goals. Recommendation for discharge: (in order for the patient to meet his/her long term goals)  Therapy up to 5 days/week in SNF setting    This discharge recommendation:  Has been made in collaboration with the attending provider and/or case management    IF patient discharges home will need the following DME: to be determined (TBD)       SUBJECTIVE:   Patient stated I feel like my legs will give away.     OBJECTIVE DATA SUMMARY:   Critical Behavior:  Neurologic State: Alert  Orientation Level: Oriented X4  Cognition: Follows commands  Safety/Judgement: Awareness of environment    Spinal diagnosis intervention:  The patient stated 3/3 back precautions when prompted. Reviewed all 3 back precautions, log roll technique, and sitting for 30 minutes at a time. Functional Mobility Training:    Bed Mobility:  Log Rolling: Minimum assistance  Supine to Sit: Moderate assistance  Sit to Supine: Minimum assistance  Scooting: Minimum assistance        Transfers:  Sit to Stand:  Moderate assistance  Stand to Sit: Moderate assistance (impaired descent to chair)                             Balance:  Sitting: Intact  Standing: Impaired; With support  Standing - Static: Fair;Constant support  Standing - Dynamic : Fair;Poor;Constant support  Ambulation/Gait Training:  Distance (ft): 35 Feet (ft)  Assistive Device: Gait belt  Ambulation - Level of Assistance: Minimal assistance     Gait Description (WDL): Exceptions to WDL  Gait Abnormalities: Decreased step clearance;Shuffling gait        Base of Support: Narrowed     Speed/Sheela: Pace decreased (<100 feet/min)  Step Length: Left shortened  Swing Pattern: Left asymmetrical                 Stairs: Therapeutic Exercises:     Pain Rating:  3/10 cervical spine    Activity Tolerance:   Fair and requires rest breaks    After treatment patient left in no apparent distress:   Supine in bed and Call bell within reach    COMMUNICATION/COLLABORATION:   The patients plan of care was discussed with: Registered nurse.      Carolyn Chambers PT, DPT   Time Calculation: 26 mins

## 2022-04-28 NOTE — PROGRESS NOTES
Problem: Mobility Impaired (Adult and Pediatric)  Goal: *Acute Goals and Plan of Care (Insert Text)  Description: FUNCTIONAL STATUS PRIOR TO ADMISSION: Patient was modified independent using a rolling walker and SB quad cane for functional mobility. HOME SUPPORT PRIOR TO ADMISSION: The patient lived with her sister in-law but did not require assist.  assisted with med management, apts, and finances    Physical Therapy Goals  Initiated 4/28/2022  1. Patient will move from supine to sit and sit to supine  in bed with minimal assistance/contact guard assist within 7 day(s). 2.  Patient will transfer from bed to chair and chair to bed with supervision/set-up using the least restrictive device within 7 day(s). 3.  Patient will perform sit to stand with minimal assistance/contact guard assist within 7 day(s). 4.  Patient will ambulate with minimal assistance/contact guard assist for 100 feet with the least restrictive device within 7 day(s). 5.  Patient will ascend/descend 4 stairs with 1 handrail(s) with modified independence within 7 day(s). Outcome: Progressing Towards Goal   PHYSICAL THERAPY EVALUATION  Patient: Brandon Membreno (74 y.o. female)  Date: 4/28/2022  Primary Diagnosis: Cervical stenosis of spine [M48.02]  Procedure(s) (LRB):  C3-5 ANTERIOR CERVICAL DISCECTOMY WITH FUSION (N/A) 1 Day Post-Op   Precautions:   Back (no BLT, sitting 30-45 min at a time)    ASSESSMENT  Based on the objective data described below, the patient presents with impaired strength (UE > LE), sensation, balance, coordination, and endurance following admission for a C3-5 ACDF with myelopathy. She demonstrates impaired coordination in UEs > LEs and impaired  strength. Additional time and clarification required for hx taking d/t tangential speech. Facilitated mobility in the room utilizing a RW for balance and safety requiring minimal assistance.  During gait, she presented with shuffling steps, a left asymmetrical swing, impaired balance, and patient expressed fear of falling. Returned to a bedside chair with a poorly controlled descent. The patient presents below her functional baseline and is at elevated risk of falls after strength and coordination changes. Recommend discharge to IP rehab to maximize her functional recovery when medically stable. Current Level of Function Impacting Discharge (mobility/balance): up to moderate assist for transfers, poor balance         Patient will benefit from skilled therapy intervention to address the above noted impairments. PLAN :  Recommendations and Planned Interventions: bed mobility training, transfer training, gait training, therapeutic exercises, neuromuscular re-education and therapeutic activities      Frequency/Duration: Patient will be followed by physical therapy:  twice daily to address goals. Recommendation for discharge: (in order for the patient to meet his/her long term goals)  Therapy 3 hours per day 5-7 days per week    This discharge recommendation:  Has been made in collaboration with the attending provider and/or case management    IF patient discharges home will need the following DME: to be determined (TBD)         SUBJECTIVE:   Patient stated I feel weak.     OBJECTIVE DATA SUMMARY:   HISTORY:    Past Medical History:   Diagnosis Date    Anxiety     Hypertension     Psychotic disorder ([facility] [address]     Schizophrenia    Schizoaffective disorder, depressive type ([facility] [address]     Thyroid disease      Past Surgical History:   Procedure Laterality Date    HX COLONOSCOPY      HX ORTHOPAEDIC Right     CORN REMOVAL PER PT     HX OTHER SURGICAL      Ingrown toenail removal       Personal factors and/or comorbidities impacting plan of care:     Home Situation  Home Environment: Private residence  # Steps to Enter: 6  Rails to Enter: Yes  One/Two Story Residence: Two story  Lift Chair Available: No  Living Alone: No  Support Systems: Spouse/Significant Other,Other Family Member(s) (lives with sister in-law,   but avail)  Patient Expects to be Discharged to[de-identified] Rehab hospital/unit acute  Current DME Used/Available at Home: Dorothe Cloud, rollator,Blood pressure cuff,Shower chair (pulse ox)    EXAMINATION/PRESENTATION/DECISION MAKING:   Critical Behavior:  Neurologic State: Alert  Orientation Level: Oriented X4  Cognition: Follows commands  Safety/Judgement: Awareness of environment  Hearing:     Skin:    Edema:   Range Of Motion:  AROM: Generally decreased, functional                       Strength:    Strength: Grossly decreased, non-functional (poor bilateral  strength,, dec L knee extension in [facility])                    Tone & Sensation:                  Sensation: Impaired               Coordination:  Coordination: Generally decreased, functional (challenged with BUE coordination and , dropping things)  Vision:      Functional Mobility:  Bed Mobility:  Rolling: Minimum assistance  Supine to Sit: Moderate assistance     Scooting: Minimum assistance  Transfers:  Sit to Stand: Moderate assistance  Stand to Sit: Moderate assistance (impaired descent to chair)                       Balance:   Sitting: Intact  Standing: Impaired; With support  Standing - Static: Fair;Constant support  Standing - Dynamic : Fair;Poor;Constant support  Ambulation/Gait Training:  Distance (ft): 20 Feet (ft)  Assistive Device: Gait belt  Ambulation - Level of Assistance: Minimal assistance     Gait Description (WDL): Exceptions to WDL  Gait Abnormalities: Decreased step clearance;Shuffling gait        Base of Support: Narrowed     Speed/Sheela: Pace decreased (<100 feet/min)  Step Length: Left shortened  Swing Pattern: Left asymmetrical                    Physical Therapy Evaluation Charge Determination   History Examination Presentation Decision-Making   MEDIUM  Complexity : 1-2 comorbidities / personal factors will impact the outcome/ POC  HIGH Complexity : 4+ Standardized tests and measures addressing body structure, function, activity limitation and / or participation in recreation  LOW Complexity : Stable, uncomplicated  LOW Complexity : FOTO score of       Based on the above components, the patient evaluation is determined to be of the following complexity level: LOW     Pain Ratin/10 cervical spine    Activity Tolerance:   Fair    After treatment patient left in no apparent distress:   Sitting in chair and Call bell within reach    COMMUNICATION/EDUCATION:   The patients plan of care was discussed with: Registered nurse. Fall prevention education was provided and the patient/caregiver indicated understanding., Patient/family have participated as able in goal setting and plan of care. and Patient/family agree to work toward stated goals and plan of care.     Thank you for this referral.  Diego Mcrae, PT, DPT   Time Calculation: 38 mins

## 2022-04-28 NOTE — PROGRESS NOTES
Problem: Falls - Risk of  Goal: *Absence of Falls  Description: Document Carlota Kasia Fall Risk and appropriate interventions in the flowsheet. Outcome: Progressing Towards Goal  Note: Fall Risk Interventions:  Mobility Interventions: Patient to call before getting OOB         Medication Interventions: Patient to call before getting OOB    Elimination Interventions: Call light in reach,Patient to call for help with toileting needs    History of Falls Interventions: Door open when patient unattended         Problem: Pressure Injury - Risk of  Goal: *Prevention of pressure injury  Description: Document Jeyson Scale and appropriate interventions in the flowsheet.   Outcome: Progressing Towards Goal  Note: Pressure Injury Interventions:  Sensory Interventions: Avoid rigorous massage over bony prominences    Moisture Interventions: Check for incontinence Q2 hours and as needed    Activity Interventions: Assess need for specialty bed    Mobility Interventions: Assess need for specialty bed    Nutrition Interventions: Discuss nutritional consult with provider    Friction and Shear Interventions: Lift team/patient mobility team

## 2022-04-28 NOTE — PROGRESS NOTES
Orthopedic Spine Progress Note  Post Op day: 1 Day Post-Op    2022 10:59 AM   Admit Date: 2022  Procedure: Procedure(s):  C3-5 ANTERIOR CERVICAL DISCECTOMY WITH FUSION    Subjective:     Janki Sy has complaints of some neck stiffness. She reports some difficulty with swallowing which has improved. She reports her symptoms are improved compared to prior to surgery. Pain is well controlled. Tolerating diet. No N/V. Pain Control:   Pain Assessment  Pain Scale 1: Numeric (0 - 10)  Pain Intensity 1: 0  Pain Onset 1: surgery  Pain Location 1: Back  Pain Orientation 1: Posterior  Pain Description 1: Constant  Pain Intervention(s) 1: Medication (see MAR)    Objective:          Physical Exam:  General:  Alert and oriented. No acute distress. Heart:  Respirations unlabored. Abdomen:   Extremities: Soft, non-tender. No evidence of cyanosis. Pulses palpable in both upper and lower extremities. Neurologic:  Musculoskeletal:  No new motor deficits. Neurovascular exam within normal limits. Sensation stable. Motor: unchanged C5-T1 and L2-S1. Fazal's sign negative in bilateral lower extremities. Calves soft, nontender upon palpation and with passive twitch. Moves both upper and lower extremities. Incision: clean, dry, and intact. No significant erythema or swelling. No active drainage noted. Vital Signs:    Blood pressure (!) 163/97, pulse 100, temperature 98.8 °F (37.1 °C), resp. rate 16, height 5' 7\" (1.702 m), weight 162 lb (73.5 kg), SpO2 94 %. Temp (24hrs), Av.8 °F (36.6 °C), Min:97.3 °F (36.3 °C), Max:98.8 °F (37.1 °C)      LAB:    No results for input(s): HCT, HGB, PLT, HCTEXT, HGBEXT, PLTEXT in the last 72 hours.   Lab Results   Component Value Date/Time    Sodium 144 2022 03:47 PM    Potassium 4.1 2022 03:47 PM    Chloride 111 (H) 2022 03:47 PM    CO2 30 2022 03:47 PM    Glucose 78 2022 03:47 PM    BUN 16 2022 03:47 PM Creatinine 1.32 (H) 04/20/2022 03:47 PM    Calcium 10.3 (H) 04/20/2022 03:47 PM       Intake/Output:04/28 0701 - 04/28 1900  In: -   Out: 750 [Urine:750]  04/26 1901 - 04/28 0700  In: 2556 [P.O.:50; I.V.:1020]  Out: 6687 [Urine:1200]    PT/OT:   Gait:                    Assessment:   Patient is 1 Day Post-Op s/p Procedure(s):  C3-5 ANTERIOR CERVICAL DISCECTOMY WITH FUSION    Plan:     1. Continue PT/OT  2. Continue established methods of pain control  3. VTE Prophylaxes - TEDS &/or SCDs   4. Advance diet  5. PT recommending possible SNF v IPR pending progress.  Patient had significant myelopathy prior to surgery due to cord compression       Signed By: TONY Carpio

## 2022-04-28 NOTE — PROGRESS NOTES
Problem: Self Care Deficits Care Plan (Adult)  Goal: *Acute Goals and Plan of Care (Insert Text)  Description: FUNCTIONAL STATUS PRIOR TO ADMISSION: Patient was modified independent using a rollator for functional mobility. Per pt, she was able to dress/bathe self. HOME SUPPORT: The patient lived with  in [facility] and as well as her sister in [place]. Occupational Therapy Goals  Initiated 4/28/2022    1. Patient will perform lower body dressing with supervision/set-up using within 3 days. 2.  Patient will perform upper body dressing with supervision/set-up within 3 days. 3.  Patient will standing ADLs 5 mins at supervision/set-up within 3 days. 4.  Patient will don/doff neck brace at supervision/set-up within 3 days. 5.  Patient will verbalize/demonstrate 3/3 cervical precautions during ADL tasks without cues within 3 days. Outcome: Progressing Towards Goal   OCCUPATIONAL THERAPY EVALUATION  Patient: Robb Mcintosh (74 y.o. female)  Date: 4/28/2022  Primary Diagnosis: Cervical stenosis of spine [M48.02]  Procedure(s) (LRB):  C3-5 ANTERIOR CERVICAL DISCECTOMY WITH FUSION (N/A) 1 Day Post-Op   Precautions:   Back (no BLT, sitting 30-45 min at a time)    ASSESSMENT  Based on the objective data described below, the patient presents with spinal precautions, unsteady gait, decrease coordination/strength B UEs, decrease mobility and decrease functional reach. Pt reports living with her sister in [place] as well as residing with her  in [facility]. Pt does not drive. Pt presents at min assist to max assist level with mobility and self-care. Feel pt would benefit from further rehab at discharge. Will con't to follow and address listed goals.      Current Level of Function Impacting Discharge (ADLs/self-care): min to max assist    Other factors to consider for discharge: who will be assisting pt at discharge     Patient will benefit from skilled therapy intervention to address the above noted impairments. PLAN :  Recommendations and Planned Interventions: self care training, functional mobility training, therapeutic exercise, balance training, therapeutic activities, endurance activities, neuromuscular re-education, patient education, and home safety training    Frequency/Duration: Patient will be followed by occupational therapy 5 times a week to address goals. Recommendation for discharge: (in order for the patient to meet his/her long term goals)  Therapy 3 hours per day 5-7 days per week    This discharge recommendation:  A follow-up discussion with the attending provider and/or case management is planned    IF patient discharges home will need the following DME: none       SUBJECTIVE:   Patient stated My  takes me to doctor appts, goes to grocery store and gets my medications.  My mail goes to my sister's home    OBJECTIVE DATA SUMMARY:   HISTORY:   Past Medical History:   Diagnosis Date    Anxiety     Hypertension     Psychotic disorder ([facility] [address]     Schizophrenia    Schizoaffective disorder, depressive type ([facility] [address]     Thyroid disease      Past Surgical History:   Procedure Laterality Date    HX COLONOSCOPY      HX ORTHOPAEDIC Right     CORN REMOVAL PER PT     HX OTHER SURGICAL      Ingrown toenail removal       Expanded or extensive additional review of patient history:     Home Situation  Patient Expects to be Discharged to[de-identified] Rehab hospital/unit acute    Hand dominance: Right    EXAMINATION OF PERFORMANCE DEFICITS:  Cognitive/Behavioral Status:  Neurologic State: Alert  Orientation Level: Oriented X4  Cognition: Follows commands        Safety/Judgement: Awareness of environment    Skin: intact    Edema: none noted    Hearing:       Vision/Perceptual:                                     Range of Motion:    AROM: Generally decreased, functional                         Strength:    Strength: Grossly decreased, non-functional (poor bilateral  strength,, dec L knee extension in [facility])                Coordination:  Coordination: Generally decreased, functional (challenged with BUE coordination and , dropping things)  Fine Motor Skills-Upper: Left Impaired;Right Impaired    Gross Motor Skills-Upper: Left Intact; Right Intact    Tone & Sensation:       Sensation: Impaired                      Balance:  Sitting: Intact  Standing: Impaired; With support  Standing - Static: Fair;Constant support  Standing - Dynamic : Fair;Poor;Constant support    Functional Mobility and Transfers for ADLs:  Bed Mobility:  Rolling: Minimum assistance  Supine to Sit: Moderate assistance  Scooting: Minimum assistance    Transfers:  Sit to Stand: Moderate assistance;Maximum assistance (Simultaneous filing. User may not have seen previous data.)  Stand to Sit: Moderate assistance (impaired descent to chair)  Bathroom Mobility: Minimum assistance  Assistive Device : Walker, rolling    ADL Assessment:  Feeding: Setup    Oral Facial Hygiene/Grooming: Minimum assistance; Moderate assistance    Bathing: Moderate assistance    Upper Body Dressing: Minimum assistance    Lower Body Dressing: Maximum assistance    Toileting: Minimum assistance; Moderate assistance                ADL Intervention and task modifications:         Cognitive Retraining  Safety/Judgement: Awareness of environment      Pain Rating:  No c/o pain    Activity Tolerance:   Fair    After treatment patient left in no apparent distress:    Sitting in chair, Call bell within reach, and Bed / chair alarm activated    COMMUNICATION/EDUCATION:   The patients plan of care was discussed with: Physical therapist and Registered nurse. Home safety education was provided and the patient/caregiver indicated understanding., Patient/family have participated as able in goal setting and plan of care. , and Patient/family agree to work toward stated goals and plan of care. This patients plan of care is appropriate for delegation to [facility].     Thank you for this referral.  Elder Mcnamara, OTR/L  Time Calculation: 23 mins

## 2022-04-29 VITALS
TEMPERATURE: 97.7 F | OXYGEN SATURATION: 98 % | DIASTOLIC BLOOD PRESSURE: 86 MMHG | WEIGHT: 162 LBS | BODY MASS INDEX: 25.43 KG/M2 | RESPIRATION RATE: 16 BRPM | HEART RATE: 78 BPM | HEIGHT: 67 IN | SYSTOLIC BLOOD PRESSURE: 132 MMHG

## 2022-04-29 PROCEDURE — 74011000250 HC RX REV CODE- 250: Performed by: STUDENT IN AN ORGANIZED HEALTH CARE EDUCATION/TRAINING PROGRAM

## 2022-04-29 PROCEDURE — 74011250637 HC RX REV CODE- 250/637: Performed by: PHYSICIAN ASSISTANT

## 2022-04-29 PROCEDURE — 97530 THERAPEUTIC ACTIVITIES: CPT

## 2022-04-29 PROCEDURE — 97116 GAIT TRAINING THERAPY: CPT

## 2022-04-29 PROCEDURE — 74011250637 HC RX REV CODE- 250/637: Performed by: STUDENT IN AN ORGANIZED HEALTH CARE EDUCATION/TRAINING PROGRAM

## 2022-04-29 RX ORDER — AMOXICILLIN 250 MG
1 CAPSULE ORAL 2 TIMES DAILY
Qty: 30 TABLET | Refills: 0 | Status: SHIPPED | OUTPATIENT
Start: 2022-04-29

## 2022-04-29 RX ORDER — OXYCODONE HYDROCHLORIDE 5 MG/1
5 TABLET ORAL
Qty: 30 TABLET | Refills: 0 | Status: SHIPPED | OUTPATIENT
Start: 2022-04-29 | End: 2022-05-13

## 2022-04-29 RX ADMIN — CINACALCET HYDROCHLORIDE 30 MG: 30 TABLET, FILM COATED ORAL at 08:59

## 2022-04-29 RX ADMIN — SENNOSIDES AND DOCUSATE SODIUM 1 TABLET: 8.6; 5 TABLET ORAL at 08:59

## 2022-04-29 RX ADMIN — LACTULOSE 30 ML: 20 SOLUTION ORAL at 08:59

## 2022-04-29 RX ADMIN — CYANOCOBALAMIN TAB 500 MCG 1000 MCG: 500 TAB at 08:59

## 2022-04-29 RX ADMIN — OXYCODONE 5 MG: 5 TABLET ORAL at 08:59

## 2022-04-29 RX ADMIN — SODIUM CHLORIDE, PRESERVATIVE FREE 10 ML: 5 INJECTION INTRAVENOUS at 06:27

## 2022-04-29 RX ADMIN — POLYETHYLENE GLYCOL 3350 17 G: 17 POWDER, FOR SOLUTION ORAL at 08:59

## 2022-04-29 RX ADMIN — AMLODIPINE BESYLATE 10 MG: 5 TABLET ORAL at 08:59

## 2022-04-29 RX ADMIN — ACETAMINOPHEN 1000 MG: 500 TABLET ORAL at 06:27

## 2022-04-29 NOTE — DISCHARGE SUMMARY
Spine Discharge Summary    Patient ID:  Ursula Werner  [id number]  female  74 y.o.  1955    Admit date: 4/27/2022    Discharge date: 4/29/2022    Admitting Physician: Leonard Bynum MD     Consulting Physician(s):   Treatment Team: Attending Provider: Kat Brower MD; Occupational Therapist: Anita Norman OTR/L; Physical Therapist: Nathaniel Phillip PT, DPT; Care Manager: Kiley Peraza    Date of Surgery:   4/27/2022     Preoperative Diagnosis:  CHRONIC NECK PAIN    Postoperative Diagnosis:   CHRONIC NECK PAIN    Procedure(s):  C3-5 ANTERIOR CERVICAL DISCECTOMY WITH FUSION     Anesthesia Type:   General     Surgeon: Kat Brower MD                            HPI:  Pt is a 74 y.o. female who has a history of CHRONIC NECK PAIN  with pain and limitations of activities of daily living who presents at this time for a C3-5 ANTERIOR CERVICAL DISCECTOMY WITH FUSION  following the failure of conservative management. PMH:   Past Medical History:   Diagnosis Date    Anxiety     Hypertension     Psychotic disorder ([facility] [address]     Schizophrenia    Schizoaffective disorder, depressive type ([facility] [address]     Thyroid disease        Body mass index is 25.37 kg/m². : A BMI > 30 is classified as obesity and > 40 is classified as morbid obesity. Medications upon admission :   Prior to Admission Medications   Prescriptions Last Dose Informant Patient Reported? Taking? OLANZapine (ZYPREXA) 20 mg tablet 4/26/2022 at Unknown time  Yes Yes   Sig: Take 10 mg by mouth nightly. amLODIPine (NORVASC) 5 mg tablet 4/26/2022 at Unknown time  Yes Yes   Sig: Take 10 mg by mouth daily. cinacalcet (SENSIPAR) 30 mg tablet 4/26/2022 at Unknown time  Yes Yes   Sig: Take 30 mg by mouth daily. cyanocobalamin 1,000 mcg tablet 4/26/2022 at Unknown time  No Yes   Sig: Take 1 Tablet by mouth daily for 90 days. Low B12 level.   Indications: prevention of vitamin B12 deficiency   lactulose (CHRONULAC) 10 gram/15 mL solution 4/26/2022 at Unknown time  Yes Yes   Sig: TAKE 30 MILLILITERS BY MOUTH once daily      Facility-Administered Medications: None        Allergies:  No Known Allergies     Hospital Course: The patient underwent surgery. Complications:  None; patient tolerated the procedure well. Was taken to the PACU in stable condition and then transferred to the ortho floor. Perioperative Antibiotics:  Ancef     Postoperative Pain Management:  Oxycodone & Tylenol     Postoperative transfusions:    Number of units banked PRBCs =   none     Post Op complications: none    Hemoglobin at discharge:    Lab Results   Component Value Date/Time    HGB 13.2 04/20/2022 03:47 PM    INR 1.0 04/20/2022 03:47 PM       Dressing was changed on POD # 1 &2. Incision - clean, dry and intact. No significant erythema or swelling. Wound appears to be healing without any evidence of infection. Neurovascular exam found to be within normal limits. Physical Therapy started following surgery and participated in bed mobility, transfers and ambulation. Gait:  Gait  Base of Support: Narrowed  Speed/Sheela: Pace decreased (<100 feet/min)  Step Length: Left shortened  Swing Pattern: Left asymmetrical  Gait Abnormalities: Decreased step clearance,Shuffling gait  Ambulation - Level of Assistance: Minimal assistance  Distance (ft): 35 Feet (ft)  Assistive Device: Gait belt                   Discharged to: Home. Condition on Discharge:   good    Discharge instructions:  - Take pain medications as prescribed  - Resume pre hospital diet      - Discharge activity: activity as tolerated  - Ambulate as tolerated  - Avoid bending, lifting and twisting  - Cervical Collar  - Wound Care Keep wound clean and dry. See discharge instruction sheet.             -DISCHARGE MEDICATION LIST     Current Discharge Medication List      START taking these medications    Details   senna-docusate (PERICOLACE) 8.6-50 mg per tablet Take 1 Tablet by mouth two (2) times a day.  Qty: 30 Tablet, Refills: 0  Start date: 4/29/2022      oxyCODONE IR (ROXICODONE) 5 mg immediate release tablet Take 1 Tablet by mouth every four (4) hours as needed for Pain for up to 14 days. Max Daily Amount: 30 mg. Indications: pain  Qty: 30 Tablet, Refills: 0  Start date: 4/29/2022, End date: 5/13/2022    Associated Diagnoses: S/P cervical spinal fusion         CONTINUE these medications which have NOT CHANGED    Details   cyanocobalamin 1,000 mcg tablet Take 1 Tablet by mouth daily for 90 days. Low B12 level. Indications: prevention of vitamin B12 deficiency  Qty: 90 Tablet, Refills: 0    Associated Diagnoses: Low serum vitamin B12      lactulose (CHRONULAC) 10 gram/15 mL solution TAKE 30 MILLILITERS BY MOUTH once daily      cinacalcet (SENSIPAR) 30 mg tablet Take 30 mg by mouth daily. amLODIPine (NORVASC) 5 mg tablet Take 10 mg by mouth daily. OLANZapine (ZYPREXA) 20 mg tablet Take 10 mg by mouth nightly. Associated Diagnoses: Senile osteoporosis;  Hypercalcemia; Multinodular goiter          per medical continuation form      -Follow up in office in 2 weeks      Signed:  TONY Pruett     4/29/2022  11:47 AM

## 2022-04-29 NOTE — DISCHARGE INSTRUCTIONS
After Hospital Care Plan:  Discharge Instructions Cervical (Neck) Spine Surgery Dr. Chidi Avalos    Patient Name: Angel Call    Date of procedure: 4/27/2022  Date of discharge: 4/29/2022    Procedure: Procedure(s):  C3-5 ANTERIOR CERVICAL DISCECTOMY WITH FUSION  PCP: Chito Gates MD    Follow up appointments   -follow up with Dr. Chidi Avalos in 2 weeks. Call [phone] to make an appointment as soon as you get home from the hospital.    When to call your Orthopaedic Surgeon:  -Difficulty swallowing that is worse than when you left the hospital.  -Signs of infection-if your incision is red; continues to have drainage; drainage has a foul odor or if you have a persistent fever over 101 degrees for 24 hours  -nausea or vomiting, severe headache  -changes in sensation in your arms or legs (numbness, tingling, loss of color)  -increased weakness-greater than before your surgery  -severe pain or pain not relieved by medications  -Signs of a blood clot in your leg-calf pain, tenderness, redness, swelling of lower leg    When to call your Primary Care Physician:  -Concerns about medical conditions such as diabetes, high blood pressure, asthma, congestive heart failure  -Call if blood sugars are elevated, persistent headache or dizziness, coughing or congestion, constipation or diarrhea, burning with urination, abnormal heart rate    When to call 911 and go to the nearest emergency room:  -acute onset of chest pain, shortness of breath, difficulty breathing    Activity  - You are going home a well person, be as active as possible. Your only exercise should be walking. Start with short frequent walks and increase your walking distance each day.  -Limit the amount of time you sit to 20-30 minute intervals. Sitting for prolonged periods of time will be uncomfortable for you following surgery.   - Do NOT lift anything over 5 pounds  -From now on, even when lifting light weight, bend with your knees and not your back.  -Do NOT do any neck exercises until you have been instructed by your doctor  -When you are in bed, you may lay on your back or on either side. Do NOT lie on your stomach    Cervical Collar (Aspen Collar)  -You are required to wear your cervical collar at all times; except when showering. You may remove the collar long enough to change the pads when needed and to change your dressing each day. -Do not bend or twist when your collar is off. It is best to have someone assist you when changing the pads and your dressing to prevent you from bending your neck. - Clean the pads on your neck collar every day by hand washing with a mild soap and water. Pat them dry with a towel and lay out to air dry. Do not use heat to dry the pads. Driving  -You may not drive or return to work until instructed by your physician. However, you may ride in the car for short periods of time. Incision Care  Your incision has been closed with absorbable sutures and steri-strips. This will assist with healing. Steri-strips are to remain on your incision for 2 weeks. A dry dressing (ABD and tape) will be placed over it and should be changed daily, for at least the first several days after your surgery. If you have no incisional drainage, you may leave the incision open to air if you wish, still leaving the steri-strips in place. Please make sure to wash your hands prior to touching your dressing. You may take brief showers but do not run the water directly onto the wound. After your shower, blot your incision dry with a soft towel and replace the dry dressing. Do not allow the tape to come in contact with the steri-strips. Do not rub or apply any lotions or ointments to your incision site. Do not soak or scrub your wound. Your steri-strips will be removed during your two week follow-up appointment.  If you experience drainage leaking from underneath the steri-strips or if it peels off before 2 weeks, please contact your orthopedic surgeons office. Showering  -You may shower in approximately 2 days after your surgery.    -Leave the dressing on during your shower. Do NOT allow the water to run directly onto your dressing. Once you get out of the shower, put on a dry dressing.  -Reminder- Make sure you put clean pads on your collar after your shower.    -Do not take a tub bath. Preventing blood clots  -You have been given T.E.D. stockings to wear. Continue to wear these for 7 days after your discharge. Put them on in the morning and take them off at night.    -They are used to increase your circulation and prevent blood clots from forming in your legs  -T. E.D. stockings can be machine washed, temperature not to exceed 160° F (71°C) and machine dried for 15 to 20 minutes, temperature not to exceed 250° F (121°C). Pain management  -Take pain medication as prescribed; decrease the amount you use as your pain lessens  -Do not wait until you are in extreme pain to take your medication.  -Avoid alcoholic beverages while taking pain medication    Pain Medication Safety  DO:  -Read the Medication Guide   -Take your medicine exactly as prescribed   -Store your medicine away from children and in a safe place   -Flush unused medicine down the toilet   -Call your healthcare provider for medical advice about side effects. You may report side effects to FDA at 5-772-FDA-6613.   -Please be aware that many medications contain Tylenol. We do not want you to over medicate so please read the information below as a guide. Do not take more than 4 Grams of Tylenol in a 24 hour period.   (There are 1000 milligrams in one Gram)                                                                                                                                                                                                    Percocet contains 325 mg of Tylenol per tablet (do not take more than 12 tablets in 24 hours)  Lortab contains 500 mg of Tylenol per tablet (do not take more than 8 tablets in 24 hours)  Norco contains 325 mg of Tylenol per tablet (do not take more than 12 tablets in 24 hours). DO NOT:  -Do not give your medicine to others   -Do not take medicine unless it was prescribed for you   -Do not stop taking your medicine without talking to your healthcare provider   -Do not break, chew, crush, dissolve, or inject your medicine. If you cannot  swallow your medicine whole, talk to your healthcare provider.  -Do not drink alcohol while taking this medicine  -Do not take anti-inflammatory medications or aspirin unless instructed by your     Physician. Diet  -resume usual diet; drink plenty of fluids; eat foods high in fiber  -It is important to have regular bowel movements. Pain medications may cause constipation. You may want to take a stool softener (such as Senokot-S or Colace) to prevent constipation. If constipation occurs, take a laxative (such as Dulcolax tablets, Milk of Magnesia, or a suppository). Laxatives should only be used if the above preventable measures have failed and you still have not had a bowel movement after three days.

## 2022-04-29 NOTE — PROGRESS NOTES
Problem: Falls - Risk of  Goal: *Absence of Falls  Description: Document Kinsley Isaban Fall Risk and appropriate interventions in the flowsheet.   Outcome: Resolved/Met

## 2022-04-29 NOTE — PROGRESS NOTES
Transition of Care Plan   RUR- Observation     DISPOSITION: The disposition plan is Master Mendoza   o RM# [id number]/ Call Report: [phone]   F/U with PCP/Specialist     Transport: [facility] 1:00pm    *EMTALA needs to be printed- CM completed their portion       Inpatient Rehab/ Hospital to Hospital Transition of Care Note /Discharge Note       EMTALA filed and ready for MD to sign at bedside chart. Accepting Physician: Dr. Kacey Monson    Discharging Physician: Dr. Tierney Galvan     Accepting Representative: [facility]: [facility]     RN call to report: [phone]    Transport: [facility] phone [phone]      time: 1pm    Ambulance packet at bedside chart. The attending physician and the primary nurse were notified of the plan. Medicare pt has received, reviewed, and signed 2nd IM letter informing them of their right to appeal the discharge. Signed copy has been placed on pt bedside chart.     CM: [address] [place],   [phone]

## 2022-04-29 NOTE — ROUTINE PROCESS
TRANSFER - OUT REPORT:    Verbal report given to Serina(name) on Janki Sy  being transferred to [place]) for {TRANSFER CARE:82543}       Report consisted of patients Situation, Background, Assessment and   Recommendations(SBAR). Information from the following report(s) {SBAR REPORTS PN:[id number]} was reviewed with the receiving nurse. Lines:       Opportunity for questions and clarification was provided.       Patient transported with:   {TRANSPORTDETAILS:62661}

## 2022-04-29 NOTE — PROGRESS NOTES
Spine Surgery Progress Note  Em Felder PA-C          Admit Date: 2022   LOS: 0 days        Daily Progress Note: 2022    POD:2 Days Post-Op    S/P: Procedure(s):  C3-5 ANTERIOR CERVICAL DISCECTOMY WITH FUSION    Subjective:     Ms. Maryanne Pappas is a pleasant 82-year-old female with a history of worsening neck pain, bilateral arm arm, weakness in lower extremities with signs of myelopathy. Imaging revealed cervical stenosis at C3-4 and C4-5 from broad based disk protrusions. Myelomalacia and cord changes noted as well. Patient underwent C3-5 ACDF surgery on 22, she tolerated the procedure well. Today, patient feels well. She is swallowing and voiding without issue. +flatus. Making good progress with PT/OT. Pt denies chest pain, leg pain, nausea, vomiting, difficulty swallowing, headache, and dyspnea. Pt resting comfortably in bed. Objective:     Vital signs  Temp (24hrs), Av.1 °F (36.7 °C), Min:97.5 °F (36.4 °C), Max:98.7 °F (37.1 °C)   No intake/output data recorded.    1901 -  0700  In: -   Out: 750 [Urine:750]    Visit Vitals  BP (!) 149/74 (BP 1 Location: Right upper arm, BP Patient Position: At rest;Lying)   Pulse 79   Temp 97.7 °F (36.5 °C)   Resp 16   Ht 5' 7\" (1.702 m)   Wt 73.5 kg (162 lb)   SpO2 97%   BMI 25.37 kg/m²    O2 Flow Rate (L/min): 2 l/min O2 Device: Nasal cannula     Output (mL)  Urine Voided: 750 ml (22 0954)  Last Bowel Movement Date: 22 (22 0103)     Pain control  Pain Assessment  Pain Scale 1: Numeric (0 - 10)  Pain Intensity 1: 0  Pain Onset 1: surgery  Pain Location 1: Back  Pain Orientation 1: Posterior  Pain Description 1: Constant  Pain Intervention(s) 1: Medication (see MAR)    PT/OT  Gait     Gait  Base of Support: Narrowed  Speed/Sheela: Pace decreased (<100 feet/min)  Step Length: Left shortened  Swing Pattern: Left asymmetrical  Gait Abnormalities: Decreased step clearance,Shuffling gait  Ambulation - Level of Assistance: Minimal assistance  Distance (ft): 35 Feet (ft)  Assistive Device: Gait belt        Physical Exam:  Gen: No acute distress. Neuro: A&Ox3. Follows commands. Speech clear. Affect normal.  CATES spontaneously. Strength 4+/5 in BUE and 5/5 BLE. Some generalized weakness and deconditioning appreciated. Sensation intact. Gait deferred. Calves soft and supple; No pain with passive stretch  Skin: Incision C/D/I    24 hour results:    No results found for this or any previous visit (from the past 24 hour(s)).      Assessment:     Active Problems:    Cervical stenosis of spine (4/27/2022)      Plan:     s/p C3-5 ACDF  -PT/OT - in soft collar    -Pain control - scheduled tylenol, prn oxycodone   -Regular diet   -Daily bandage changes to incision   -CM consult for IPR   -Cont home meds   -Add nicotine patch   -Bowel regimen    Readiness for discharge:     [x] Vital Signs stable    [x] + Voiding    [x] Wound intact, drainage minimal    [x] Tolerating PO intake     [] Cleared by PT (OT if applicable)    [x] Adequate pain control on oral medication alone       Activity: up with assist  DVT ppx: SCDs  Dispo: IPR today    Plan d/w Dr. Felicia Olson PA

## 2022-04-29 NOTE — PROGRESS NOTES
Problem: Mobility Impaired (Adult and Pediatric)  Goal: *Acute Goals and Plan of Care (Insert Text)  Description: FUNCTIONAL STATUS PRIOR TO ADMISSION: Patient was modified independent using a rolling walker and SB quad cane for functional mobility. HOME SUPPORT PRIOR TO ADMISSION: The patient lived with her sister in-law but did not require assist.  assisted with med management, apts, and finances    Physical Therapy Goals  Initiated 4/28/2022  1. Patient will move from supine to sit and sit to supine  in bed with minimal assistance/contact guard assist within 7 day(s). 2.  Patient will transfer from bed to chair and chair to bed with supervision/set-up using the least restrictive device within 7 day(s). 3.  Patient will perform sit to stand with minimal assistance/contact guard assist within 7 day(s). 4.  Patient will ambulate with minimal assistance/contact guard assist for 100 feet with the least restrictive device within 7 day(s). 5.  Patient will ascend/descend 4 stairs with 1 handrail(s) with modified independence within 7 day(s). Outcome: Progressing Towards Goal   PHYSICAL THERAPY TREATMENT  Patient: Saeed Damian (66 y.o. female)  Date: 4/29/2022  Diagnosis: Cervical stenosis of spine [M48.02] <principal problem not specified>  Procedure(s) (LRB):  C3-5 ANTERIOR CERVICAL DISCECTOMY WITH FUSION (N/A) 2 Days Post-Op  Precautions: Back (no BLT, sitting 30-45 min at a time) No bending, no lifting greater than 5 lbs, no twisting, log-roll technique, repositioning every 20-30 min except when sleeping, brace when OOB (if ordered)  Chart, physical therapy assessment, plan of care and goals were reviewed. ASSESSMENT  Patient continues with skilled PT services and is progressing towards goals. She remains motivated for progress but limited by impaired coordination, strength, balance, and overall endurance.  Gait training progressed to 39' using a RW with a narrow BERRY, shuffling steps, and asymmetrical step length requiring minimal assist for gait. Noted slowed dorinda and moderate patient fatigue as she returned to a bedside chair. The patient remains below her baseline level of function and has limited home support. Recommend discharge to IP rehab when medically ready. Current Level of Function Impacting Discharge (mobility/balance): moderate assist sit to stand           PLAN :  Patient continues to benefit from skilled intervention to address the above impairments. Continue treatment per established plan of care. to address goals. Recommendation for discharge: (in order for the patient to meet his/her long term goals)  Therapy 3 hours per day 5-7 days per week    This discharge recommendation:  Has been made in collaboration with the attending provider and/or case management    IF patient discharges home will need the following DME: to be determined (TBD)       SUBJECTIVE:   Patient stated Paulino johnson for helping me.     OBJECTIVE DATA SUMMARY:   Critical Behavior:  Neurologic State: Alert  Orientation Level: Oriented X4  Cognition: Follows commands  Safety/Judgement: Awareness of environment    Spinal diagnosis intervention:  The patient stated 3/3 back precautions when prompted. Reviewed all 3 back precautions, log roll technique, and sitting for 30 minutes at a time. Functional Mobility Training:    Bed Mobility:  Log Rolling: Minimum assistance  Supine to Sit: Minimum assistance; Moderate assistance     Scooting: Minimum assistance; Additional time        Transfers:  Sit to Stand: Moderate assistance; Additional time                                Balance:  Sitting: Intact  Standing: Impaired; With support  Standing - Static: Fair;Constant support  Standing - Dynamic : Fair;Poor;Constant support  Ambulation/Gait Training:  Distance (ft): 45 Feet (ft)  Assistive Device: Gait belt;Walker, rolling  Ambulation - Level of Assistance: Minimal assistance        Gait Abnormalities: Decreased step clearance;Shuffling gait        Base of Support: Narrowed     Speed/Sheela: Pace decreased (<100 feet/min)  Step Length: Left shortened  Swing Pattern: Left asymmetrical           Pain Ratin/10 cervical spine    Activity Tolerance:   Fair and requires rest breaks    After treatment patient left in no apparent distress:   Sitting in chair and Call bell within reach    COMMUNICATION/COLLABORATION:   The patients plan of care was discussed with: Registered nurse.      Verner Loach, PT, DPT   Time Calculation: 28 mins

## 2022-04-30 ENCOUNTER — HOSPITAL ENCOUNTER (OUTPATIENT)
Dept: LAB | Age: 67
Discharge: HOME | End: 2022-04-30

## 2022-04-30 LAB
ALBUMIN SERPL-MCNC: 3.7 G/DL (ref 3.5–5)
ALBUMIN/GLOB SERPL: 1.1 {RATIO} (ref 1.1–2.2)
ALP SERPL-CCNC: 84 U/L (ref 45–117)
ALT SERPL-CCNC: 23 U/L (ref 12–78)
ANION GAP SERPL CALC-SCNC: 1 MMOL/L (ref 5–15)
AST SERPL W P-5'-P-CCNC: 18 U/L (ref 15–37)
BASOPHILS # BLD: 0 K/UL (ref 0–0.1)
BASOPHILS NFR BLD: 0 % (ref 0–1)
BILIRUB SERPL-MCNC: 0.4 MG/DL (ref 0.2–1)
BUN SERPL-MCNC: 16 MG/DL (ref 6–20)
BUN/CREAT SERPL: 12 (ref 12–20)
CA-I BLD-MCNC: 10.1 MG/DL (ref 8.5–10.1)
CHLORIDE SERPL-SCNC: 114 MMOL/L (ref 97–108)
CO2 SERPL-SCNC: 33 MMOL/L (ref 21–32)
CREAT SERPL-MCNC: 1.36 MG/DL (ref 0.55–1.02)
DIFFERENTIAL METHOD BLD: NORMAL
EOSINOPHIL # BLD: 0.1 K/UL (ref 0–0.4)
EOSINOPHIL NFR BLD: 1 % (ref 0–7)
ERYTHROCYTE [DISTWIDTH] IN BLOOD BY AUTOMATED COUNT: 13.5 % (ref 11.5–14.5)
GLOBULIN SER CALC-MCNC: 3.3 G/DL (ref 2–4)
GLUCOSE SERPL-MCNC: 111 MG/DL (ref 65–100)
HCT VFR BLD AUTO: 43.1 % (ref 35–47)
HGB BLD-MCNC: 13.6 G/DL (ref 11.5–16)
IMM GRANULOCYTES # BLD AUTO: 0 K/UL (ref 0–0.04)
IMM GRANULOCYTES NFR BLD AUTO: 0 % (ref 0–0.5)
LYMPHOCYTES # BLD: 1.1 K/UL (ref 0.8–3.5)
LYMPHOCYTES NFR BLD: 16 % (ref 12–49)
MCH RBC QN AUTO: 30.6 PG (ref 26–34)
MCHC RBC AUTO-ENTMCNC: 31.6 G/DL (ref 30–36.5)
MCV RBC AUTO: 96.9 FL (ref 80–99)
MONOCYTES # BLD: 0.7 K/UL (ref 0–1)
MONOCYTES NFR BLD: 11 % (ref 5–13)
NEUTS SEG # BLD: 4.6 K/UL (ref 1.8–8)
NEUTS SEG NFR BLD: 72 % (ref 32–75)
NRBC # BLD: 0 K/UL (ref 0–0.01)
NRBC BLD-RTO: 0 PER 100 WBC
PLATELET # BLD AUTO: 212 K/UL (ref 150–400)
PMV BLD AUTO: 12.5 FL (ref 8.9–12.9)
POTASSIUM SERPL-SCNC: 4.4 MMOL/L (ref 3.5–5.1)
PROT SERPL-MCNC: 7 G/DL (ref 6.4–8.2)
RBC # BLD AUTO: 4.45 M/UL (ref 3.8–5.2)
SODIUM SERPL-SCNC: 148 MMOL/L (ref 136–145)
WBC # BLD AUTO: 6.5 K/UL (ref 3.6–11)

## 2022-04-30 PROCEDURE — 80053 COMPREHEN METABOLIC PANEL: CPT

## 2022-04-30 PROCEDURE — 36415 COLL VENOUS BLD VENIPUNCTURE: CPT

## 2022-04-30 PROCEDURE — 85025 COMPLETE CBC W/AUTO DIFF WBC: CPT

## 2022-05-16 ENCOUNTER — TRANSCRIBE ORDER (OUTPATIENT)
Dept: REGISTRATION | Age: 67
End: 2022-05-16

## 2022-05-16 ENCOUNTER — HOSPITAL ENCOUNTER (OUTPATIENT)
Dept: LAB | Age: 67
Discharge: HOME | End: 2022-05-16
Payer: MEDICARE

## 2022-05-16 DIAGNOSIS — N18.31 STAGE 3A CHRONIC KIDNEY DISEASE (HCC): ICD-10-CM

## 2022-05-16 DIAGNOSIS — N18.31 STAGE 3A CHRONIC KIDNEY DISEASE (HCC): Primary | ICD-10-CM

## 2022-05-16 LAB
ALBUMIN SERPL-MCNC: 3.6 G/DL (ref 3.5–5)
ALBUMIN SERPL-MCNC: 3.6 G/DL (ref 3.5–5)
ANION GAP SERPL CALC-SCNC: 10 MMOL/L (ref 5–15)
APPEARANCE UR: CLEAR
BACTERIA URNS QL MICRO: NEGATIVE /HPF
BILIRUB UR QL: NEGATIVE
BUN SERPL-MCNC: 15 MG/DL (ref 6–20)
BUN/CREAT SERPL: 10 (ref 12–20)
CA-I BLD-MCNC: 9.5 MG/DL (ref 8.5–10.1)
CHLORIDE SERPL-SCNC: 109 MMOL/L (ref 97–108)
CO2 SERPL-SCNC: 28 MMOL/L (ref 21–32)
COLOR UR: ABNORMAL
CREAT SERPL-MCNC: 1.53 MG/DL (ref 0.55–1.02)
GLUCOSE SERPL-MCNC: 149 MG/DL (ref 65–100)
GLUCOSE UR STRIP.AUTO-MCNC: NEGATIVE MG/DL
HGB UR QL STRIP: NEGATIVE
KETONES UR QL STRIP.AUTO: NEGATIVE MG/DL
LEUKOCYTE ESTERASE UR QL STRIP.AUTO: NEGATIVE
NITRITE UR QL STRIP.AUTO: NEGATIVE
PH UR STRIP: 6 [PH] (ref 5–8)
PHOSPHATE SERPL-MCNC: 3.7 MG/DL (ref 2.6–4.7)
POTASSIUM SERPL-SCNC: 4 MMOL/L (ref 3.5–5.1)
PROT UR STRIP-MCNC: NEGATIVE MG/DL
RBC #/AREA URNS HPF: ABNORMAL /HPF (ref 0–3)
SODIUM SERPL-SCNC: 147 MMOL/L (ref 136–145)
SP GR UR REFRACTOMETRY: <1 (ref 1–1.03)
UROBILINOGEN UR QL STRIP.AUTO: 0.2 EU/DL (ref 0.2–1)
WBC URNS QL MICRO: ABNORMAL /HPF (ref 0–5)

## 2022-05-16 PROCEDURE — 82570 ASSAY OF URINE CREATININE: CPT

## 2022-05-16 PROCEDURE — 82040 ASSAY OF SERUM ALBUMIN: CPT

## 2022-05-16 PROCEDURE — 81001 URINALYSIS AUTO W/SCOPE: CPT

## 2022-05-16 PROCEDURE — 80069 RENAL FUNCTION PANEL: CPT

## 2022-05-18 ENCOUNTER — OFFICE VISIT (OUTPATIENT)
Dept: ORTHOPEDIC SURGERY | Age: 67
End: 2022-05-18
Payer: MEDICARE

## 2022-05-18 VITALS — WEIGHT: 168 LBS | HEIGHT: 67 IN | BODY MASS INDEX: 26.37 KG/M2

## 2022-05-18 DIAGNOSIS — Z98.1 S/P CERVICAL SPINAL FUSION: Primary | ICD-10-CM

## 2022-05-18 LAB
ALBUMIN MFR UR ELPH: 0 %
ALPHA1 GLOB MFR UR ELPH: 0 %
ALPHA2 GLOB 24H MFR UR ELPH: 0 %
B-GLOBULIN 24H MFR UR ELPH: 0 %
CREAT UR-MCNC: 17.4 MG/DL
GAMMA GLOB 24H MFR UR ELPH: 0 %
M PROTEIN MFR UR ELPH: NORMAL %
PLEASE NOTE:, 133800: NORMAL
PROT UR-MCNC: <4 MG/DL

## 2022-05-18 PROCEDURE — 99024 POSTOP FOLLOW-UP VISIT: CPT | Performed by: STUDENT IN AN ORGANIZED HEALTH CARE EDUCATION/TRAINING PROGRAM

## 2022-05-18 NOTE — PROGRESS NOTES
Mars Calvo (: 1955) is a 74 y.o. female, patient, here for evaluation of the following chief complaint(s):  Surgical Follow-up       ASSESSMENT/PLAN:  Below is the assessment and plan developed based on review of pertinent history, physical exam, labs, studies, and medications. Patient presents today approximately 2 weeks status post recent ACDF. I am happy with her progress thus far. Radiologic findings reviewed in detail with the patient. Discussed with the patient that she may begin to wean out of her cervical soft collar. Continued restrictions discussed. She will continue with using her bone stimulator daily. She will continue with home health physical therapy. She has weaned herself off of narcotic pain medications, she will continue with over-the-counter acetaminophen as needed. She will continue to avoid anti-inflammatories at this time. For the patient to call the office with any worsening of her symptoms. She will follow-up in 4 weeks, at which time we may begin outpatient physical therapy. 1. S/P cervical spinal fusion  -     XR SPINE CERV PA LAT ODONT 3 V MAX; Future      No follow-ups on file. SUBJECTIVE/OBJECTIVE:  Mars Calvo (: 1955) is a 74 y.o. female. No flowsheet data found. Patient presents today approximately 2 weeks status post recent C3-5 ACDF. She is doing well overall. She denies any pain at this time, she states she takes Tylenol as needed. She is no longer taking any narcotic pain medication. She has been compliant with wearing her brace as well has her bending, lifting and twisting restrictions. She has been wearing her bone stimulator daily since obtaining it a few days ago. She denies incisional drainage, fevers or chills. She began working with home health physical therapy a few days ago and feels as if it is going well.     Imaging:    XR Results (most recent):  Results from Appointment encounter on 22    XR SPINE CERV PA LAT ODONT 3 V MAX    Narrative  AP and lateral views of the cervical spine reveal stable C3-5 ACDF. Stable hardware. Anterior bridging osteophytes at C5-6. No evidence of acute fractures, lytic lesions or subluxations noted. No Known Allergies    Current Outpatient Medications   Medication Sig    senna-docusate (PERICOLACE) 8.6-50 mg per tablet Take 1 Tablet by mouth two (2) times a day.  cyanocobalamin 1,000 mcg tablet Take 1 Tablet by mouth daily for 90 days. Low B12 level. Indications: prevention of vitamin B12 deficiency    lactulose (CHRONULAC) 10 gram/15 mL solution TAKE 30 MILLILITERS BY MOUTH once daily    cinacalcet (SENSIPAR) 30 mg tablet Take 30 mg by mouth daily.  amLODIPine (NORVASC) 5 mg tablet Take 10 mg by mouth daily.  OLANZapine (ZYPREXA) 20 mg tablet Take 10 mg by mouth nightly. No current facility-administered medications for this visit. Past Medical History:   Diagnosis Date    Anxiety     Hypertension     Psychotic disorder (HCC)     Schizophrenia    Schizoaffective disorder, depressive type ([facility] [address]     Thyroid disease         Past Surgical History:   Procedure Laterality Date    HX COLONOSCOPY      HX ORTHOPAEDIC Right     CORN REMOVAL PER PT     HX OTHER SURGICAL      Ingrown toenail removal       Family History   Problem Relation Age of Onset    No Known Problems Mother     No Known Problems Father     Anesth Problems Neg Hx         Social History     Tobacco Use    Smoking status: Current Every Day Smoker     Packs/day: 0.50     Years: 45.00     Pack years: 22.50    Smokeless tobacco: Never Used   Vaping Use    Vaping Use: Never used   Substance Use Topics    Alcohol use: Not Currently    Drug use: Never        Review of Systems       Vitals:  Ht 5' 7\" (1.702 m)   Wt 168 lb (76.2 kg)   BMI 26.31 kg/m²    Body mass index is 26.31 kg/m².     Physical Exam  Integumentary  Assessment of Surgical Incision - healing and consistent with normal anticipated wound healing. Wound edges well approximated, nontender to palpation, no drainage noted emanating from the incision or on dressing. Neurologic  Overall Assessment of Muscle Strength and Tone reveals  Upper Extremities - Right Deltoid - 5/5. Left Deltoid - 5/5. Right Bicep - 5/5. Left Bicep - 5/5. Right Tricep - 5/5. Left Tricep - 5/5. Right Wrist Extensors - 5/5. Left Wrist Extensors - 5/5. Right Wrist Flexors - 5/5. Left Wrist Flexors - 5/5. Right Intrinsics - 5/5. Left Intrinsics - 5/5. General Assessment of Reflexes  Right Hand - Barros's sign is negative in the right hand. Left Hand - Barros's sign is negative in the left hand. Reflexes (Dermatomes)  2/2 Normal - Left Bicep (C5-6), Left Tricep (C7-8), Left Brachioradialis (C5-6), Right Bicep (C5-6), Right Tricep (C7-8) and Right Brachioradialis (C5-6). Musculoskeletal  Global Assessment  Examination of related systems reveals - well-developed, well-nourished, in no acute distress, alert and oriented x 3 and normal coordination. Gait and Station - normal gait and station and normal posture. Spine/Ribs/Pelvis  Cervical Spine - Evaluation of related systems reveals - no lymphadenopathy and neurovascularly intact bilaterally. Inspection and Palpation - Tenderness - moderate and localized. Assessment of pain reveals the following findings: - Location - cervical area. Dr. Christa Banks was available for immediate consult during this encounter. An electronic signature was used to authenticate this note.   -- TONY Tom

## 2022-05-18 NOTE — LETTER
5/18/2022    Patient: Saeed Damian   YOB: 1955   Date of Visit: 5/18/2022     Rickey Simpson MD  [address] [facility] [address]  Via Fax: [phone]    Dear Rickey Simpson MD,      Thank you for referring Ms. Janki Sy to [facility] for evaluation. My notes for this consultation are attached. If you have questions, please do not hesitate to call me. I look forward to following your patient along with you.       Sincerely,    TONY Tovar

## 2022-05-25 ENCOUNTER — HOSPITAL ENCOUNTER (EMERGENCY)
Age: 67
Discharge: HOME | End: 2022-05-26
Attending: EMERGENCY MEDICINE
Payer: MEDICARE

## 2022-05-25 VITALS
TEMPERATURE: 98.3 F | OXYGEN SATURATION: 100 % | BODY MASS INDEX: 26.37 KG/M2 | DIASTOLIC BLOOD PRESSURE: 104 MMHG | RESPIRATION RATE: 18 BRPM | HEART RATE: 103 BPM | WEIGHT: 168 LBS | SYSTOLIC BLOOD PRESSURE: 149 MMHG | HEIGHT: 67 IN
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
## 2022-05-25 PROCEDURE — 99284 EMERGENCY DEPT VISIT MOD MDM: CPT

## 2022-05-26 ENCOUNTER — APPOINTMENT (OUTPATIENT)
Dept: CT IMAGING | Age: 67
End: 2022-05-26
Attending: EMERGENCY MEDICINE
Payer: MEDICARE

## 2022-05-26 LAB
ALBUMIN SERPL-MCNC: 3.7 G/DL (ref 3.5–5)
ALBUMIN/GLOB SERPL: 1 {RATIO} (ref 1.1–2.2)
ALP SERPL-CCNC: 99 U/L (ref 45–117)
ALT SERPL-CCNC: 20 U/L (ref 12–78)
ANION GAP SERPL CALC-SCNC: 8 MMOL/L (ref 5–15)
APPEARANCE UR: CLEAR
AST SERPL W P-5'-P-CCNC: 12 U/L (ref 15–37)
BASOPHILS # BLD: 0 K/UL (ref 0–0.2)
BASOPHILS NFR BLD: 1 % (ref 0–2.5)
BILIRUB SERPL-MCNC: 0.4 MG/DL (ref 0.2–1)
BILIRUB UR QL: NEGATIVE
BUN SERPL-MCNC: 11 MG/DL (ref 6–20)
BUN/CREAT SERPL: 7 (ref 12–20)
CA-I BLD-MCNC: 10.7 MG/DL (ref 8.5–10.1)
CHLORIDE SERPL-SCNC: 113 MMOL/L (ref 97–108)
CO2 SERPL-SCNC: 29 MMOL/L (ref 21–32)
COLOR UR: NORMAL
CREAT SERPL-MCNC: 1.49 MG/DL (ref 0.55–1.02)
EOSINOPHIL # BLD: 0.1 K/UL (ref 0–0.7)
EOSINOPHIL NFR BLD: 2 % (ref 0.9–2.9)
ERYTHROCYTE [DISTWIDTH] IN BLOOD BY AUTOMATED COUNT: 14.1 % (ref 11.5–14.5)
GLOBULIN SER CALC-MCNC: 3.6 G/DL (ref 2–4)
GLUCOSE SERPL-MCNC: 99 MG/DL (ref 65–100)
GLUCOSE UR STRIP.AUTO-MCNC: NEGATIVE MG/DL
HCT VFR BLD AUTO: 37.9 % (ref 36–46)
HGB BLD-MCNC: 12.6 G/DL (ref 13.5–17.5)
HGB UR QL STRIP: NEGATIVE
KETONES UR QL STRIP.AUTO: NEGATIVE MG/DL
LEUKOCYTE ESTERASE UR QL STRIP.AUTO: NEGATIVE
LYMPHOCYTES # BLD: 0.9 K/UL (ref 1–4.8)
LYMPHOCYTES NFR BLD: 18 % (ref 20.5–51.1)
MCH RBC QN AUTO: 31 PG (ref 31–34)
MCHC RBC AUTO-ENTMCNC: 33.2 G/DL (ref 31–36)
MCV RBC AUTO: 93.6 FL (ref 80–100)
MONOCYTES # BLD: 0.4 K/UL (ref 0.2–2.4)
MONOCYTES NFR BLD: 8 % (ref 1.7–9.3)
NEUTS SEG # BLD: 3.7 K/UL (ref 1.8–7.7)
NEUTS SEG NFR BLD: 71 % (ref 42–75)
NITRITE UR QL STRIP.AUTO: NEGATIVE
NRBC # BLD: 0 K/UL
NRBC BLD-RTO: 0.1 PER 100 WBC
PH UR STRIP: 7 [PH] (ref 5–8)
PLATELET # BLD AUTO: 244 K/UL (ref 150–400)
PMV BLD AUTO: 9.3 FL (ref 6.5–11.5)
POTASSIUM SERPL-SCNC: 3.9 MMOL/L (ref 3.5–5.1)
PROT SERPL-MCNC: 7.3 G/DL (ref 6.4–8.2)
PROT UR STRIP-MCNC: NEGATIVE MG/DL
RBC # BLD AUTO: 4.05 M/UL (ref 4.5–5.9)
SODIUM SERPL-SCNC: 150 MMOL/L (ref 136–145)
SP GR UR REFRACTOMETRY: 1 (ref 1–1.03)
UROBILINOGEN UR QL STRIP.AUTO: 0.2 EU/DL (ref 0.2–1)
WBC # BLD AUTO: 5.1 K/UL (ref 4.4–11.3)

## 2022-05-26 PROCEDURE — 72192 CT PELVIS W/O DYE: CPT

## 2022-05-26 PROCEDURE — 36415 COLL VENOUS BLD VENIPUNCTURE: CPT

## 2022-05-26 PROCEDURE — 85025 COMPLETE CBC W/AUTO DIFF WBC: CPT

## 2022-05-26 PROCEDURE — 81003 URINALYSIS AUTO W/O SCOPE: CPT

## 2022-05-26 PROCEDURE — 80053 COMPREHEN METABOLIC PANEL: CPT

## 2022-05-26 NOTE — ED PROVIDER NOTES
EMERGENCY DEPARTMENT HISTORY AND PHYSICAL EXAM  ?    Date: 5/25/2022  Patient Name: Pedro Buenrostro    History of Presenting Illness    Patient presents with:  Fall: Patient brought in for ground level fall from standing position. Pt denies LOC or hitting head. Pt reports bilateral hip and lower back pain. History Provided By: Patient    HPI: Pedro Buenrostro, 74 y.o. female with a past medical history significant hypertension and schizophrenia presents to the ED with cc of pelvic pain. Apparently she fell. However she has had pelvic pain prior to the fall. She has chronic pain with a spine stimulator there are no other complaints, changes, or physical findings at this time. Patient was removed from the long board and c-collar removed after negative cervical neck exam.    PCP: Sun Cates MD    Current Outpatient Medications:  senna-docusate (PERICOLACE) 8.6-50 mg per tablet, Take 1 Tablet by mouth two (2) times a day., Disp: 30 Tablet, Rfl: 0  cyanocobalamin 1,000 mcg tablet, Take 1 Tablet by mouth daily for 90 days. Low B12 level. Indications: prevention of vitamin B12 deficiency, Disp: 90 Tablet, Rfl: 0  lactulose (CHRONULAC) 10 gram/15 mL solution, TAKE 30 MILLILITERS BY MOUTH once daily, Disp: , Rfl:   cinacalcet (SENSIPAR) 30 mg tablet, Take 30 mg by mouth daily. , Disp: , Rfl:   amLODIPine (NORVASC) 5 mg tablet, Take 10 mg by mouth daily. , Disp: , Rfl:   OLANZapine (ZYPREXA) 20 mg tablet, Take 10 mg by mouth nightly., Disp: , Rfl:         Past History    Past Medical History:  Past Medical History:  No date:  Anxiety  No date: Hypertension  No date: Psychotic disorder (HCC)     Comment:  Schizophrenia  No date: Schizoaffective disorder, depressive type ([facility] [address]  No date: Thyroid disease    Past Surgical History:  Past Surgical History:  No date: HX COLONOSCOPY  No date: HX ORTHOPAEDIC; Right     Comment:  CORN REMOVAL PER PT   No date: HX OTHER SURGICAL     Comment:  Ingrown toenail removal    Family History:  Review of patient's family history indicates:  Problem: No Known Problems     Relation: Mother         Age of Onset: (Not Specified)  Problem: No Known Problems     Relation: Father         Age of Onset: (Not Specified)  Problem: Anesth Problems     Relation: Neg Hx         Age of Onset: (Not Specified)      Social History:  Social History   Tobacco Use     Smoking status: Current Every Day Smoker       Packs/day: 0.50       Years: 45.00       Pack years: 22.5     Smokeless tobacco: Never Used   Vaping Use     Vaping Use: Never used   Alcohol use: Not Currently   Drug use: Never      Allergies:  No Known Allergies      Review of Systems  [unfilled]    Physical Exam  @[facility]@    Diagnostic Study Results    Labs -  Recent Results (from the past 12 hour(s))  -CBC WITH AUTOMATED DIFF:   Collection Time: 05/26/22 12:50 AM      Result                      Value             Ref Range          WBC                         5.1               4.4 - 11.3 K*      RBC                         4.05 (L)          4.50 - 5.90 *      HGB                         12.6 (L)          13.5 - 17.5 *      HCT                         37.9              36 - 46 %          MCV                         93.6              80 - 100 FL        MCH                         31.0              31 - 34 PG         MCHC                        33.2              31.0 - 36.0 *      RDW                         14.1              11.5 - 14.5 %      PLATELET                    244               150 - 400 K/*      MPV                         9.3               6.5 - 11.5 FL      NRBC                        0.1                WBC        ABSOLUTE NRBC               0.00              K/uL               NEUTROPHILS                 71                42 - 75 %          LYMPHOCYTES                 18 (L)            20.5 - 51.1 %      MONOCYTES                   8                 1.7 - 9.3 %        EOSINOPHILS                 2 0.9 - 2.9 %        BASOPHILS                   1                 0.0 - 2.5 %        ABS. NEUTROPHILS            3.7               1.8 - 7.7 K/*      ABS. LYMPHOCYTES            0.9 (L)           1.0 - 4.8 K/*      ABS. MONOCYTES              0.4               0.2 - 2.4 K/*      ABS. EOSINOPHILS            0.1               0.0 - 0.7 K/*      ABS. BASOPHILS              0.0               0.0 - 0.2 K/*  -METABOLIC PANEL, COMPREHENSIVE:   Collection Time: 05/26/22 12:50 AM      Result                      Value             Ref Range          Sodium                      150 (H)           136 - 145 mm*      Potassium                   3.9               3.5 - 5.1 mm*      Chloride                    113 (H)           97 - 108 mmo*      CO2                         29                21 - 32 mmol*      Anion gap                   8                 5 - 15 mmol/L      Glucose                     99                65 - 100 mg/*      BUN                         11                6 - 20 mg/dL       Creatinine                  1.49 (H)          0.55 - 1.02 *      BUN/Creatinine ratio        7 (L)             12 - 20            GFR est AA                  43 (L)            >60 ml/min/1*      GFR est non-AA              35 (L)            >60 ml/min/1*      Calcium                     10.7 (H)          8.5 - 10.1 m*      Bilirubin, total            0.4               0.2 - 1.0 mg*      AST (SGOT)                  12 (L)            15 - 37 U/L        ALT (SGPT)                  20                12 - 78 U/L        Alk.  phosphatase            99                45 - 117 U/L       Protein, total              7.3               6.4 - 8.2 g/*      Albumin                     3.7               3.5 - 5.0 g/*      Globulin                    3.6               2.0 - 4.0 g/*      A-G Ratio                   1.0 (L)           1.1 - 2.2      -URINALYSIS W/ RFLX MICROSCOPIC:   Collection Time: 05/26/22  1:08 AM      Result Value             Ref Range          Color                       Yellow/Straw                         Appearance                  Clear             Clear              Specific gravity            1.005             1.003 - 1.03*      pH (UA)                     7.0               5.0 - 8.0          Protein                     Negative          Negative mg/*      Glucose                     Negative          Negative mg/*      Ketone                      Negative          Negative mg/*      Bilirubin                   Negative          Negative           Blood                       Negative          Negative           Urobilinogen                0.2               0.2 - 1.0 EU*      Nitrites                    Negative          Negative           Leukocyte Esterase          Negative          Negative         Radiologic Studies -   CT PELV WO CONT  Final Result   No fracture or dislocation. Follow-up as clinically indicated. CT Results  (Last 48 hours)             05/26/22 0040  CT PELV WO CONT Final result   Impression:  No fracture or dislocation. Follow-up as clinically   indicated. Narrative:  Exam: CT PELV WO CONT      TECHNIQUE: Multiple transaxial CT images of the pelvis were obtained   without   contrast. Coronal and sagittal reformatted images were provided. Dose reduction: All CT scans at this facility are performed using dose   reduction   optimization techniques as appropriate to a performed exam including the   following: Automated exposure control, adjustments of the mA and/or kV   according   to patient size, or use of iterative reconstruction technique. COMPARISON: None      HISTORY: trauma      FINDINGS:      No fracture or dislocation identified. Degenerative changes of the hips   are   mild. Osteoarthritis of the sacroiliac joints, left greater than right. Right posterior generator pack with nerve stimulator entering the right S4     neural foramen. Leiomyomatous uterus. Colonic diverticulosis. No acute process within the   visualized pelvis. No soft tissue swelling is evident. CXR Results  (Last 48 hours)   None       Medical Decision Making and ED Course  I am the first provider for this patient. I reviewed the vital signs, available nursing notes, past medical history, past surgical history, family history and social history. Vital Signs-Reviewed the patient's vital signs. Empty flowsheet group. Records Reviewed: Nursing Notes    Provider Notes (Medical Decision Making):   Rule out pelvic fracture    The patient presents with differential diagnosis of pelvic fracture, hip fracture. ED Course:   CT scan is negative for fracture. Initial assessment performed. The patients presenting problems have been discussed, and they are in agreement with the care plan formulated and outlined with them. I have encouraged them to ask questions as they arise throughout their visit. Disposition      Discharged      DISCHARGE PLAN:  1. Current Discharge Medication List    CONTINUE these medications which have NOT CHANGED    senna-docusate (PERICOLACE) 8.6-50 mg per tablet  Take 1 Tablet by mouth two (2) times a day. Qty: 30 Tablet Refills: 0    cyanocobalamin 1,000 mcg tablet  Take 1 Tablet by mouth daily for 90 days. Low B12 level. Indications: prevention of vitamin B12 deficiency  Qty: 90 Tablet Refills: 0  Associated Diagnoses:Low serum vitamin B12    lactulose (CHRONULAC) 10 gram/15 mL solution  TAKE 30 MILLILITERS BY MOUTH once daily    cinacalcet (SENSIPAR) 30 mg tablet  Take 30 mg by mouth daily. amLODIPine (NORVASC) 5 mg tablet  Take 10 mg by mouth daily. OLANZapine (ZYPREXA) 20 mg tablet  Take 10 mg by mouth nightly. Associated Diagnoses:Senile osteoporosis; Hypercalcemia; Multinodular goiter        2.  Follow-up Information    Follow up With Specialties Details Why Darrell Huddleston MD Internal Medicine Physician  [facility]  [phone]      3. Return to ED if worse     Diagnosis    Clinical Impression: Fall, initial encounter  (primary encounter diagnosis)    Attestations:    Kaushik Perla MD    Please note that this dictation was completed with [employer], the computer voice recognition software. Quite often unanticipated grammatical, syntax, homophones, and other interpretive errors are inadvertently transcribed by the computer software. Please disregard these errors. Please excuse any errors that have escaped final proofreading. Thank you.    ?            Past Medical History:   Diagnosis Date    Anxiety     Hypertension     Psychotic disorder (HCC)     Schizophrenia    Schizoaffective disorder, depressive type ([facility] [address]     Thyroid disease        Past Surgical History:   Procedure Laterality Date    HX COLONOSCOPY      HX ORTHOPAEDIC Right     CORN REMOVAL PER PT     HX OTHER SURGICAL      Ingrown toenail removal         Family History:   Problem Relation Age of Onset    No Known Problems Mother     No Known Problems Father     Anesth Problems Neg Hx        Social History     Socioeconomic History    Marital status:      Spouse name: Not on file    Number of children: Not on file    Years of education: Not on file    Highest education level: Not on file   Occupational History    Not on file   Tobacco Use    Smoking status: Current Every Day Smoker     Packs/day: 0.50     Years: 45.00     Pack years: 22.50    Smokeless tobacco: Never Used   Vaping Use    Vaping Use: Never used   Substance and Sexual Activity    Alcohol use: Not Currently    Drug use: Never    Sexual activity: Not Currently   Other Topics Concern    Not on file   Social History Narrative    ** Merged History Encounter **          Social Determinants of Health     Financial Resource Strain:     Difficulty of Paying Living Expenses: Not on file   Food Insecurity:     Worried About Running Out of Food in the Last Year: Not on file    Ran Out of Food in the Last Year: Not on file   Transportation Needs:     Lack of Transportation (Medical): Not on file    Lack of Transportation (Non-Medical): Not on file   Physical Activity:     Days of Exercise per Week: Not on file    Minutes of Exercise per Session: Not on file   Stress:     Feeling of Stress : Not on file   Social Connections:     Frequency of Communication with Friends and Family: Not on file    Frequency of Social Gatherings with Friends and Family: Not on file    Attends Temple Services: Not on file    Active Member of [address] [place] [address] [employer] or Organizations: Not on file    Attends Club or Organization Meetings: Not on file    Marital Status: Not on file   Intimate Partner Violence:     Fear of Current or Ex-Partner: Not on file    Emotionally Abused: Not on file    Physically Abused: Not on file    Sexually Abused: Not on file   Housing Stability:     Unable to Pay for Housing in the Last Year: Not on file    Number of [place] in the Last Year: Not on file    Unstable Housing in the Last Year: Not on file         ALLERGIES: Patient has no known allergies. Review of Systems   Constitutional: Negative. HENT: Negative. Eyes: Negative. Respiratory: Negative. Cardiovascular: Negative. Gastrointestinal: Negative. Endocrine: Negative. Genitourinary: Positive for pelvic pain. Musculoskeletal: Negative. Neurological: Negative. Hematological: Negative. Psychiatric/Behavioral: Negative. Vitals:    05/25/22 2356   BP: (!) 149/104   Pulse: (!) 103   Resp: 18   Temp: 98.3 °F (36.8 °C)   SpO2: 100%   Weight: 76.2 kg (168 lb)   Height: 5' 7\" (1.702 m)            Physical Exam  Vitals and nursing note reviewed. Constitutional:       Appearance: Normal appearance. HENT:      Head: Normocephalic and atraumatic.       Right Ear: Tympanic membrane and ear canal normal.      Left Ear: Tympanic membrane and ear canal normal.      Nose: Nose normal.      Mouth/Throat:      Mouth: Mucous membranes are moist.      Pharynx: Oropharynx is clear. Eyes:      Extraocular Movements: Extraocular movements intact. Conjunctiva/sclera: Conjunctivae normal.      Pupils: Pupils are equal, round, and reactive to light. Cardiovascular:      Rate and Rhythm: Normal rate and regular rhythm. Pulses: Normal pulses. Heart sounds: Normal heart sounds. Pulmonary:      Effort: Pulmonary effort is normal.      Breath sounds: Normal breath sounds. Abdominal:      General: Abdomen is flat. Bowel sounds are normal.      Palpations: Abdomen is soft. Tenderness: There is abdominal tenderness. Musculoskeletal:         General: Tenderness present. Normal range of motion. Cervical back: Normal range of motion and neck supple. Skin:     General: Skin is warm and dry. Neurological:      General: No focal deficit present. Mental Status: She is alert and oriented to person, place, and time.    Psychiatric:         Mood and Affect: Mood normal.         Behavior: Behavior normal.          MDM  Number of Diagnoses or Management Options     Amount and/or Complexity of Data Reviewed  Tests in the radiology section of CPT®: reviewed           Procedures

## 2022-05-26 NOTE — ED NOTES
Patient d/c to home via [address] [place] [address]. Pt verbalized understanding of d/c teaching. Pt's  notified of pt's discharge. He stated that he will be home to receive patient from [address] [place] [address].

## 2022-06-23 ENCOUNTER — OFFICE VISIT (OUTPATIENT)
Dept: BEHAVIORAL/MENTAL HEALTH CLINIC | Age: 67
End: 2022-06-23
Payer: MEDICARE

## 2022-06-23 ENCOUNTER — TELEPHONE (OUTPATIENT)
Dept: BEHAVIORAL/MENTAL HEALTH CLINIC | Age: 67
End: 2022-06-23

## 2022-06-23 VITALS — BODY MASS INDEX: 24.28 KG/M2 | WEIGHT: 155 LBS

## 2022-06-23 DIAGNOSIS — F25.0 SCHIZOAFFECTIVE DISORDER, BIPOLAR TYPE (HCC): Primary | ICD-10-CM

## 2022-06-23 PROCEDURE — 90792 PSYCH DIAG EVAL W/MED SRVCS: CPT | Performed by: NURSE PRACTITIONER

## 2022-06-23 PROCEDURE — G8536 NO DOC ELDER MAL SCRN: HCPCS | Performed by: NURSE PRACTITIONER

## 2022-06-23 PROCEDURE — G8427 DOCREV CUR MEDS BY ELIG CLIN: HCPCS | Performed by: NURSE PRACTITIONER

## 2022-06-23 PROCEDURE — G8420 CALC BMI NORM PARAMETERS: HCPCS | Performed by: NURSE PRACTITIONER

## 2022-06-23 PROCEDURE — G8432 DEP SCR NOT DOC, RNG: HCPCS | Performed by: NURSE PRACTITIONER

## 2022-06-23 RX ORDER — DOCUSATE SODIUM 100 MG/1
100 CAPSULE, LIQUID FILLED ORAL 2 TIMES DAILY
COMMUNITY

## 2022-06-23 RX ORDER — OLANZAPINE 5 MG/1
5 TABLET ORAL
COMMUNITY
Start: 2022-05-12 | End: 2022-06-23 | Stop reason: SDUPTHER

## 2022-06-23 RX ORDER — OLANZAPINE 5 MG/1
5 TABLET ORAL
Qty: 90 TABLET | Refills: 1 | Status: SHIPPED | OUTPATIENT
Start: 2022-06-23 | End: 2022-08-19 | Stop reason: SDUPTHER

## 2022-06-23 NOTE — TELEPHONE ENCOUNTER
Patient's , Yomi Narayan, would like for you to give him a call. Noted on the AVS to stop taking Amlodipine. Please advise. Stated that she has an appt to see PCP today also.

## 2022-06-23 NOTE — PROGRESS NOTES
Jose Ramon Murillo is a 74 y.o. female who presents today for the following:  Chief Complaint   Patient presents with    New Patient     \"Old psychiatrist left. \"    Bipolar    Medication Management       No Known Allergies    Current Outpatient Medications   Medication Sig    docusate sodium (COLACE) 100 mg capsule Take 100 mg by mouth two (2) times a day.  OLANZapine (ZyPREXA) 5 mg tablet Take 1 Tablet by mouth nightly for 90 days.  senna-docusate (PERICOLACE) 8.6-50 mg per tablet Take 1 Tablet by mouth two (2) times a day.  lactulose (CHRONULAC) 10 gram/15 mL solution TAKE 30 MILLILITERS BY MOUTH once daily    cinacalcet (SENSIPAR) 30 mg tablet Take 30 mg by mouth daily. No current facility-administered medications for this visit.        Past Medical History:   Diagnosis Date    Anxiety     Hypertension     Psychotic disorder (HCC)     Schizophrenia    [facility]ffective disorder, depressive type ([facility] [address]     Thyroid disease        Past Surgical History:   Procedure Laterality Date    HX COLONOSCOPY      HX ORTHOPAEDIC Right     CORN REMOVAL PER PT     HX OTHER SURGICAL      Ingrown toenail removal       Family History   Problem Relation Age of Onset    No Known Problems Mother     No Known Problems Father     Anesth Problems Neg Hx        Social History     Socioeconomic History    Marital status:      Spouse name: Not on file    Number of children: Not on file    Years of education: Not on file    Highest education level: Not on file   Occupational History    Not on file   Tobacco Use    Smoking status: Current Every Day Smoker     Packs/day: 0.50     Years: 45.00     Pack years: 22.50    Smokeless tobacco: Never Used   Vaping Use    Vaping Use: Never used   Substance and Sexual Activity    Alcohol use: Not Currently    Drug use: Never    Sexual activity: Not Currently   Other Topics Concern    Not on file   Social History Narrative    ** Merged History Encounter ** Social Determinants of Health     Financial Resource Strain:     Difficulty of Paying Living Expenses: Not on file   Food Insecurity:     Worried About Running Out of Food in the Last Year: Not on file    Jah of Food in the Last Year: Not on file   Transportation Needs:     Lack of Transportation (Medical): Not on file    Lack of Transportation (Non-Medical): Not on file   Physical Activity:     Days of Exercise per Week: Not on file    Minutes of Exercise per Session: Not on file   Stress:     Feeling of Stress : Not on file   Social Connections:     Frequency of Communication with Friends and Family: Not on file    Frequency of Social Gatherings with Friends and Family: Not on file    Attends Christian Services: Not on file    Active Member of [address] [place] [address] [email or web address] or Organizations: Not on file    Attends Club or Organization Meetings: Not on file    Marital Status: Not on file   Intimate Partner Violence:     Fear of Current or Ex-Partner: Not on file    Emotionally Abused: Not on file    Physically Abused: Not on file    Sexually Abused: Not on file   Housing Stability:     Unable to Pay for Housing in the Last Year: Not on file    Number of [place] in the Last Year: Not on file    Unstable Housing in the Last Year: Not on file         Ms. Romi Ayers is a 72-year-old  -American female with long history of schizophrenia and bipolar disorder. She was a patient of Dr. Sara Hastings at [facility] psychiatry, last visit was February 2022. Patient was admitted to hospital in October 2021 for lithium toxicity. She follows up with Dr. Harley Corbett kidney specialist for stage III kidney disease. She is currently taking olanzapine 5 mg take 1 tablet at bedtime. She reports history of 1 suicide attempt at 15years old which she attempted to slit her wrist, patient reports that she received psychiatric services. She has no history of substance use disorder.     Patient presents to the clinic accompanied by her . She is clean, fully alert and oriented. Gait is stable and slow with assistance of a walker. Noted  to be very irritable and agitated during the visit. According to , patient's current condition is due to lack of lithium monitoring. He reports that she is doing somewhat better, however her condition was worsened by lithium toxicity. He reports that she talks to herself daily which she has been for the past several years. Patient denies hearing voices or seeing things. No paranoia noted. She does not present guarded, however she reports that she has been \"healed\" from schizophrenia. Patient denies feeling depressed or anxious. No suicidal/homicidal thinking noted, risk is low based on no attempts for the past several years, protective factor-Confucianist and family. Patient was reared by her parents. She denies physical, sexual or emotional abuse. She has 2 living brothers and 1  brother which she reports \"estranged\" relationship. She has a college degree in business and last worked in the . Patient  her  in 0, she has 1 daughter and a son with whom she has a good relationship. Latter day-Confucianism.  No  legal history noted. Patient denies alcohol and drug use. She reports smoking half pack of cigarettes daily. Review of Systems   Genitourinary: Positive for urgency. Musculoskeletal:        Ambulates with assistance of a walker. Psychiatric/Behavioral: Positive for memory loss. All other systems reviewed and are negative. Visit Vitals  Wt 70.3 kg (155 lb)   BMI 24.28 kg/m²     Physical Exam  Psychiatric:         Attention and Perception: Attention and perception normal.         Mood and Affect: Mood normal. Affect is blunt. Speech: Speech normal.         Behavior: Behavior normal. Behavior is cooperative. Thought Content:  Thought content normal.         Cognition and Memory: Memory is impaired. Judgment: Judgment normal.          Plan:    Continue olanzapine as prescribed. Follow-up with medical providers as appropriate. For emergencies-call 911 or go to the emergency department.

## 2022-06-29 ENCOUNTER — TRANSCRIBE ORDER (OUTPATIENT)
Dept: SCHEDULING | Age: 67
End: 2022-06-29

## 2022-06-29 DIAGNOSIS — Z13.6 ENCOUNTER FOR ABDOMINAL AORTIC ANEURYSM SCREENING: Primary | ICD-10-CM

## 2022-06-29 DIAGNOSIS — F17.210 CIGARETTE NICOTINE DEPENDENCE, UNCOMPLICATED: Primary | ICD-10-CM

## 2022-07-19 ENCOUNTER — OFFICE VISIT (OUTPATIENT)
Dept: ORTHOPEDIC SURGERY | Age: 67
End: 2022-07-19
Payer: MEDICARE

## 2022-07-19 VITALS — BODY MASS INDEX: 26.37 KG/M2 | HEIGHT: 67 IN | WEIGHT: 168 LBS

## 2022-07-19 DIAGNOSIS — R29.898 WEAKNESS OF BOTH LEGS: ICD-10-CM

## 2022-07-19 DIAGNOSIS — Z98.1 S/P CERVICAL SPINAL FUSION: Primary | ICD-10-CM

## 2022-07-19 PROCEDURE — 99024 POSTOP FOLLOW-UP VISIT: CPT | Performed by: STUDENT IN AN ORGANIZED HEALTH CARE EDUCATION/TRAINING PROGRAM

## 2022-07-21 NOTE — PROGRESS NOTES
Fatmata Uriostegui (: 1955) is a 74 y.o. female, patient, here for evaluation of the following chief complaint(s):  Surgical Follow-up       ASSESSMENT/PLAN:  Below is the assessment and plan developed based on review of pertinent history, physical exam, labs, studies, and medications. Patient presents today approximately 6 weeks status post recent ACDF. She states she has been progressing well with home health physical therapy and would like to continue. She has transportation difficulties with getting to outpatient physical therapy. She did request an outpatient PT referral in the event that she is able to organize consistent transportation. She will continue with using her bone stimulator daily. She will continue with over-the-counter acetaminophen as needed. Radiologic findings reviewed in detail with the patient. She does have some persistent weakness, discussed with the patient that due to prior cord compression, surgery prevented worsening of her symptoms, but it is difficult to predict how much function she will regain. Informed the patient to call the office with any worsening of her symptoms. She will follow-up in 6 weeks. 1. S/P cervical spinal fusion  -     XR SPINE CERV PA LAT ODONT 3 V MAX; Future  -     REFERRAL TO PHYSICAL THERAPY  2. Weakness of both legs  -     REFERRAL TO PHYSICAL THERAPY      No follow-ups on file. SUBJECTIVE/OBJECTIVE:  Fatmata Uriostegui (: 1955) is a 74 y.o. female. No flowsheet data found. Patient presents today approximately 6 weeks status post recent C3-5 ACDF. She continues to progress well overall. She has been working with home health physical therapy, and states that she is noticing improvement in her lower extremity strength. She would like to continue with home health physical therapy, she has transportation issues with getting to outpatient physical therapy. Denies any incisional difficulties.   Denies any acute changes in her symptoms. She denies any neck pain at this time, and continues with acetaminophen as needed. Imaging:    XR Results (most recent):  Results from Appointment encounter on 07/19/22    XR SPINE CERV PA LAT ODONT 3 V MAX    Narrative  AP and lateral of the cervical spine demonstrates a stable C3-C5 cervical fusion. No acute changes noted       No Known Allergies    Current Outpatient Medications   Medication Sig    docusate sodium (COLACE) 100 mg capsule Take 100 mg by mouth two (2) times a day. OLANZapine (ZyPREXA) 5 mg tablet Take 1 Tablet by mouth nightly for 90 days. lactulose (CHRONULAC) 10 gram/15 mL solution TAKE 30 MILLILITERS BY MOUTH once daily    cinacalcet (SENSIPAR) 30 mg tablet Take 30 mg by mouth daily. senna-docusate (PERICOLACE) 8.6-50 mg per tablet Take 1 Tablet by mouth two (2) times a day. (Patient not taking: Reported on 7/19/2022)     No current facility-administered medications for this visit. Past Medical History:   Diagnosis Date    Anxiety     Hypertension     Psychotic disorder ([facility] [address]     Schizophrenia    Schizoaffective disorder, depressive type ([facility] [address]     Thyroid disease         Past Surgical History:   Procedure Laterality Date    HX COLONOSCOPY      HX ORTHOPAEDIC Right     CORN REMOVAL PER PT     HX OTHER SURGICAL      Ingrown toenail removal       Family History   Problem Relation Age of Onset    No Known Problems Mother     No Known Problems Father     Anesth Problems Neg Hx         Social History     Tobacco Use    Smoking status: Every Day     Packs/day: 0.50     Years: 45.00     Pack years: 22.50     Types: Cigarettes    Smokeless tobacco: Never   Vaping Use    Vaping Use: Never used   Substance Use Topics    Alcohol use: Not Currently    Drug use: Never        Review of Systems   Musculoskeletal:  Positive for arthralgias and myalgias. Neurological:  Positive for weakness. All other systems reviewed and are negative.        Vitals:  Ht 5' 7\" (1.702 m)   Wt 168 lb (76.2 kg)   BMI 26.31 kg/m²    Body mass index is 26.31 kg/m². Physical Exam  Integumentary  Assessment of Surgical Incision - healing and consistent with normal anticipated wound healing. Neurologic  Overall Assessment of Muscle Strength and Tone reveals  Upper Extremities - Right Deltoid - 4/5. Left Deltoid - 5/5. Right Bicep - 4/5. Left Bicep - 5/5. Right Tricep - 5/5. Left Tricep - 5/5. Right Wrist Extensors - 5/5. Left Wrist Extensors - 5/5. Right Wrist Flexors - 5/5. Left Wrist Flexors - 5/5. Right Intrinsics - 5/5. Left Intrinsics - 5/5. General Assessment of Reflexes  Right Hand - Barros's sign is negative in the right hand. Left Hand - Barros's sign is negative in the left hand. Reflexes (Dermatomes)  2/2 Normal - Left Bicep (C5-6), Left Tricep (C7-8), Left Brachioradialis (C5-6), Right Bicep (C5-6), Right Tricep (C7-8) and Right Brachioradialis (C5-6). Neurologic  Sensory  Light Touch - Intact - Globally. Overall Assessment of Muscle Strength and Tone reveals  Lower Extremities - Right Iliopsoas - 5/5. Left Iliopsoas - 5/5. Right Tibialis Anterior - 4/5. Left Tibialis Anterior - 4/5. Right Gastroc-Soleus - 5/5. Left Gastroc-Soleus - 5/5. Right EHL - 5/5. Left EHL - 5/5. Integumentary  Assessment of Surgical Incision - healing and consistent with normal anticipated wound healing. Musculoskeletal  Global Assessment  Examination of related systems reveals - well-developed, well-nourished, in no acute distress, alert and oriented x 3 and normal coordination. Gait and Station - normal gait and station and normal posture. Spine/Ribs/Pelvis  Cervical Spine - Evaluation of related systems reveals - no lymphadenopathy and neurovascularly intact bilaterally. Inspection and Palpation - Tenderness - moderate and localized. Assessment of pain reveals the following findings: - Location - cervical area. Dr. Kam Chirinos was available for immediate consult during this encounter.   An electronic signature was used to authenticate this note.   -- TONY Bae

## 2022-07-25 ENCOUNTER — TELEPHONE (OUTPATIENT)
Dept: ORTHOPEDIC SURGERY | Age: 67
End: 2022-07-25

## 2022-07-25 DIAGNOSIS — R29.898 WEAKNESS OF BOTH LEGS: ICD-10-CM

## 2022-07-25 DIAGNOSIS — Z98.1 S/P CERVICAL SPINAL FUSION: Primary | ICD-10-CM

## 2022-07-25 DIAGNOSIS — M48.02 CERVICAL STENOSIS OF SPINAL CANAL: ICD-10-CM

## 2022-08-08 ENCOUNTER — TELEPHONE (OUTPATIENT)
Dept: ORTHOPEDIC SURGERY | Age: 67
End: 2022-08-08

## 2022-08-18 ENCOUNTER — HOSPITAL ENCOUNTER (OUTPATIENT)
Dept: PHYSICAL THERAPY | Age: 67
Discharge: HOME | End: 2022-08-18
Payer: MEDICARE

## 2022-08-18 PROCEDURE — 97161 PT EVAL LOW COMPLEX 20 MIN: CPT
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
## 2022-08-18 NOTE — PROGRESS NOTES
[address] AND [facility], [address]  Ph: [phone]    Fax: [phone]    Plan of Care/Statement of Necessity for Physical Therapy Services  2-15    Patient name: Cami Barndt  : 1955  Provider#: [id number]  Referral source: Olga Larkin MD      Medical/Treatment Diagnosis: S/P cervical spinal fusion [Z98.1]  Weakness of both legs [R29.898]  Cervical stenosis of spinal canal [M48.02]     Prior Hospitalization: see medical history     Comorbidities: see medical history  Prior Level of Function:  Requires Assistance with ADL  Medications: Verified on Patient Summary List  Start of Care: 2022      Onset Date: 2022   The [address] [place] [address] and following information is based on the information from the initial evaluation. Assessment/ key information:   Subjective: 74year old black female who had cervical fusion performed at [facility] [place] on 2022. Patient stated following surgery she was not able to do anything for herself. She loss her ability to speak and loss strength in her arms and legs. She was sent to [facility] skilled Nursing facility for 13 days and then discharged Home with Home Health Physical Therapy. Patient stated that she lives at home with her  in [facility] now, but resides in [place], [place]. Patient stated that she has to walk with the walker due to balance disturbance and leg weakness, not able to stand, she requires assistance with bathing/ dressing and lifting her legs into the bed. Patient presents decreased bilateral shoulder flexion and abduction, bilateral LE weakness, balance disturbance, high fall risk, decreased transfers and decreased ability to perform ADL. Patient will benefit from Physical Therapy to increase LE strength, improve mobility and transfers to promote independence as much as possible. Patient requires moderate assistance with ADL and would like to return to assisting herself.  Physical Therapy will continue to increase her physical ability to achieve her maximal level of function. Evaluation Complexity History LOW Complexity : Zero comorbidities / personal factors that will impact the outcome / POC; Examination LOW Complexity : 1-2 Standardized tests and measures addressing body structure, function, activity limitation and / or participation in recreation  ;Presentation LOW Complexity : Stable, uncomplicated  ;Clinical Decision Making TUG Score:  61 seconds  Overall Complexity Rating: LOW     Problem List: pain affecting function, decrease ROM, decrease strength, impaired gait/ balance, decrease ADL/ functional abilitiies, decrease activity tolerance, and decrease transfer abilities   Treatment Plan may include any combination of the following: Therapeutic exercise, Therapeutic activities, Neuromuscular re-education, Physical agent/modality, Gait/balance training, Patient education, Functional mobility training, Home safety training, and Stair training  Patient / Family readiness to learn indicated by: asking questions  Persons(s) to be included in education: patient (P)  Barriers to Learning/Limitations: None  Patient Goal (s): To take care of myself independently. Patient Self Reported Health Status: fair  Rehabilitation Potential: fair    Short Term Goals: To be accomplished in 6 treatments. Patient will be able to perform exercises to improve strength and balance to improve mobility. Patient will be stand and wash dishes for 5 minute with walker to increase ADL. Patient will be able to ambulate with walker for 200 feet independent to improve endurance with no rest period. Patient will be able to stand and take a sink bath for 10 minutes with to improve ADL. Patient will be able to perform TUG less than 45 seconds to decrease risk of falls. Long Term Goals: To be accomplished in 12 treatments.   Patient will be able to perform sit to stand with 1 hand with 1 attempt with increased LE strength. Patient will be able to perform supine to sit and sit to supine with minimal assistance with LE strength. Patient will be able to ambulate with walker for 300 feet independent to improve endurance with no rest period  Patient will be able to perform TUG less than 45 seconds to decrease risk of falls. Frequency / Duration: Patient to be seen 2 times per week for 12 treatments. Patient/ Caregiver education and instruction: exercises    [x]  Plan of care has been reviewed with PTA        Certification Period: 2022 to November 15, 2022    Nam Rasmussen PT,  2022     ________________________________________________________________________    I certify that the above Therapy Services are being furnished while the patient is under my care. I agree with the treatment plan and certify that this therapy is necessary.     [de-identified] Signature:____________________  Date:____________Time: _________    Patient name: Abbi Valdivia  : 1955  Provider#: [id number]

## 2022-08-19 ENCOUNTER — OFFICE VISIT (OUTPATIENT)
Dept: BEHAVIORAL/MENTAL HEALTH CLINIC | Age: 67
End: 2022-08-19
Payer: MEDICARE

## 2022-08-19 VITALS — WEIGHT: 156 LBS | BODY MASS INDEX: 24.43 KG/M2

## 2022-08-19 DIAGNOSIS — F25.0 SCHIZOAFFECTIVE DISORDER, BIPOLAR TYPE (HCC): ICD-10-CM

## 2022-08-19 PROCEDURE — G8536 NO DOC ELDER MAL SCRN: HCPCS | Performed by: NURSE PRACTITIONER

## 2022-08-19 PROCEDURE — 99213 OFFICE O/P EST LOW 20 MIN: CPT | Performed by: NURSE PRACTITIONER

## 2022-08-19 PROCEDURE — 1123F ACP DISCUSS/DSCN MKR DOCD: CPT | Performed by: NURSE PRACTITIONER

## 2022-08-19 PROCEDURE — G8427 DOCREV CUR MEDS BY ELIG CLIN: HCPCS | Performed by: NURSE PRACTITIONER

## 2022-08-19 PROCEDURE — G8420 CALC BMI NORM PARAMETERS: HCPCS | Performed by: NURSE PRACTITIONER

## 2022-08-19 PROCEDURE — 1090F PRES/ABSN URINE INCON ASSESS: CPT | Performed by: NURSE PRACTITIONER

## 2022-08-19 PROCEDURE — 3017F COLORECTAL CA SCREEN DOC REV: CPT | Performed by: NURSE PRACTITIONER

## 2022-08-19 PROCEDURE — G8399 PT W/DXA RESULTS DOCUMENT: HCPCS | Performed by: NURSE PRACTITIONER

## 2022-08-19 PROCEDURE — G9899 SCRN MAM PERF RSLTS DOC: HCPCS | Performed by: NURSE PRACTITIONER

## 2022-08-19 PROCEDURE — G8432 DEP SCR NOT DOC, RNG: HCPCS | Performed by: NURSE PRACTITIONER

## 2022-08-19 PROCEDURE — 1101F PT FALLS ASSESS-DOCD LE1/YR: CPT | Performed by: NURSE PRACTITIONER

## 2022-08-19 RX ORDER — OLANZAPINE 5 MG/1
5 TABLET ORAL
Qty: 90 TABLET | Refills: 3 | Status: SHIPPED | OUTPATIENT
Start: 2022-08-19 | End: 2022-11-17

## 2022-08-19 NOTE — PROGRESS NOTES
Lucie Bagley is a 74 y.o. female who presents today for the following:  Chief Complaint   Patient presents with    Follow-up    Medication Management     \"I feel pretty good. \"       No Known Allergies    Current Outpatient Medications   Medication Sig    OLANZapine (ZyPREXA) 5 mg tablet Take 1 Tablet by mouth nightly for 90 days. docusate sodium (COLACE) 100 mg capsule Take 100 mg by mouth two (2) times a day. senna-docusate (PERICOLACE) 8.6-50 mg per tablet Take 1 Tablet by mouth two (2) times a day. (Patient not taking: Reported on 7/19/2022)    lactulose (CHRONULAC) 10 gram/15 mL solution TAKE 30 MILLILITERS BY MOUTH once daily    cinacalcet (SENSIPAR) 30 mg tablet Take 30 mg by mouth daily. No current facility-administered medications for this visit.        Past Medical History:   Diagnosis Date    Anxiety     Hypertension     Psychotic disorder ([facility] [address]     Schizophrenia    Schizoaffective disorder, depressive type ([facility] [address]     Thyroid disease        Past Surgical History:   Procedure Laterality Date    HX COLONOSCOPY      HX ORTHOPAEDIC Right     CORN REMOVAL PER PT     HX OTHER SURGICAL      Ingrown toenail removal       Family History   Problem Relation Age of Onset    No Known Problems Mother     No Known Problems Father     Anesth Problems Neg Hx        Social History     Socioeconomic History    Marital status:      Spouse name: Not on file    Number of children: Not on file    Years of education: Not on file    Highest education level: Not on file   Occupational History    Not on file   Tobacco Use    Smoking status: Every Day     Packs/day: 0.50     Years: 45.00     Pack years: 22.50     Types: Cigarettes    Smokeless tobacco: Never   Vaping Use    Vaping Use: Never used   Substance and Sexual Activity    Alcohol use: Not Currently    Drug use: Never    Sexual activity: Not Currently   Other Topics Concern    Not on file   Social History Narrative    ** Merged History Encounter ** Social Determinants of Health     Financial Resource Strain: Not on file   Food Insecurity: Not on file   Transportation Needs: Not on file   Physical Activity: Not on file   Stress: Not on file   Social Connections: Not on file   Intimate Partner Violence: Not on file   Housing Stability: Not on file         Mrs. Evangelina Lee follows up in clinic for schizophrenia and bipolar disorder. She is currently prescribed olanzapine 5 mg take 1 tablet at bedtime. Patient last visited the clinic June 23, 2022. She reports overall her mood has been stable, however she has been having trouble sleeping at least 4 nights a week. Patient presents to the clinic unaccompanied, clean, fully alert, and oriented. Gait is stable with assistance of a walker. Mood is stable. She denies feeling depressed or anxious on today's visit. She reports trouble sleeping at least 4 nights a week for over the past month. She reports sometimes feeling overwhelmed, however she is able to \"overcome\" her stressors. Appetite is stable. No psychotic symptoms observed or reported. No suicidal/homicidal thinking, risk is low, protective factor-family. Patient reports that she lives in the home with her  in a stable home environment. No drug or alcohol use reported. Review of Systems   Musculoskeletal:  Positive for back pain and joint pain. Psychiatric/Behavioral:  Positive for memory loss. The patient has insomnia. All other systems reviewed and are negative. Visit Vitals  Wt 70.8 kg (156 lb)   BMI 24.43 kg/m²     Physical Exam  Psychiatric:         Attention and Perception: Attention and perception normal.         Mood and Affect: Mood and affect normal.         Speech: Speech normal.         Behavior: Behavior normal. Behavior is cooperative. Thought Content: Thought content normal.         Cognition and Memory: Memory is impaired (forgetful at times).          Judgment: Judgment normal. Plan:    Continue olanzapine as prescribed, refills provided pending finding a psychiatric provider. Resources provided. Patient is receptive to trying over-the-counter melatonin for sleep. Follow-up with medical provider as appropriate. For emergencies, call 911 or go to the emergency department.

## 2022-08-24 ENCOUNTER — HOSPITAL ENCOUNTER (OUTPATIENT)
Dept: PHYSICAL THERAPY | Age: 67
Discharge: HOME | End: 2022-08-24
Payer: MEDICARE

## 2022-08-24 PROCEDURE — 97116 GAIT TRAINING THERAPY: CPT

## 2022-08-24 PROCEDURE — 97110 THERAPEUTIC EXERCISES: CPT

## 2022-08-24 NOTE — PROGRESS NOTES
PT DAILY TREATMENT NOTE - [facility] 2-15    Patient Name: Imani Jeffries  Date:2022  : 1955  [x]  Patient  Verified  Payor: [facility] MEDICARE / Plan: [address] / Product Type: Managed Care Medicare /    In time:3:00  Out time:4:10  Total Treatment Time (min): 70  Total Timed Codes (min): 70  1:1 Treatment Time ( [address] only): 70   Visit #:  2    Treatment Area: S/P cervical spinal fusion [Z98.1]  Weakness of both legs [R29.898]  Cervical stenosis of spinal canal [M48.02]    SUBJECTIVE  Pain Level (0-10 scale): 10  Any medication changes, allergies to medications, adverse drug reactions, diagnosis change, or new procedure performed?: [x] No    [] Yes (see summary sheet for update)  Subjective functional status/changes:     \"I have a little stressful pain in my right lower leg. \"    OBJECTIVE      62 min Therapeutic Exercise:  [x] See flow sheet :   Rationale: increase ROM, increase strength, and improve balance to improve the patients ability to attain and maintain highest practicable functional mobility      8 min Gait Trainin  feet x 1 and 126 ft. X 1 with FWW device on level surfaces with SBA and therapist providing verbal education to increase step /stance width. Rationale: increase strength and improve balance  to improve the patients ability to attain and maintain highest practicable functional capacity. With   [] TE   [] TA   [] neuro   [] other: Patient Education: [x] Review HEP    [] Progressed/Changed HEP based on:   [] positioning   [] body mechanics   [] transfers   [] heat/ice application    [] other:      Other Objective/Functional Measures: N/A     Pain Level (0-10 scale) post treatment: 4/10    ASSESSMENT/Changes in Function:   Patient received ambulating with FWW with narrow BERRY and feet almost touching at times. Pt. Received gt. Tng. With  ft. X 1 and 126 ft.  X 1 with therapist providing SBA for safety and verbal education to increase step width. Pt. Also demonstrated bilateral steppage gait pattern with decreased ankle DF, during swing phase. Pt. Able to significantly improve step/stance width with verbal education and increased ambulation. Therapist provided CGA for all standing LE exercises in parallel bars for optimal safety and patient tolerated 20 reps of all standing exercise with one seated rest break required to complete. Pt. Demonstrated poor LE eccentric control when lowering to chair and required verbal education for correct hand placement and verbal education to work on increasing LE eccentric control with stand to sit transfer as well as correct hand placement/sequencing . Pt. Also demonstrates effortful sit to stand transfers and uses hip momentum to extend knees during rise to stand. The pt tolerated treatment well and demonstrated improved gait quality with gait training and exercise this visit. .   Patient will continue to benefit from skilled PT services to modify and progress therapeutic interventions, address functional mobility deficits, address ROM deficits, address strength deficits, analyze and cue movement patterns, analyze and modify body mechanics/ergonomics, assess and modify postural abnormalities, and address imbalance/dizziness to attain remaining goals. [x]  See Plan of Care  []  See progress note/recertification  []  See Discharge Summary            Short Term Goals: To be accomplished in 6 treatments. Patient will be able to perform exercises to improve strength and balance to improve mobility. Patient will be stand and wash dishes for 5 minute with walker to increase ADL. Patient will be able to ambulate with walker for 200 feet independent to improve endurance with no rest period. Patient will be able to stand and take a sink bath for 10 minutes with to improve ADL. Patient will be able to perform TUG less than 45 seconds to decrease risk of falls. Long Term Goals:  To be accomplished in 12 treatments. Patient will be able to perform sit to stand with 1 hand with 1 attempt with increased LE strength. Patient will be able to perform supine to sit and sit to supine with minimal assistance with LE strength. Patient will be able to ambulate with walker for 300 feet independent to improve endurance with no rest period  Patient will be able to perform TUG less than 45 seconds to decrease risk of falls.        PLAN  [x]  Upgrade activities as tolerated     [x]  Continue plan of care  []  Update interventions per flow sheet       []  Discharge due to:_  []  Other:_      Raina Hernandez PTA, L.P.T.A. 8/24/2022

## 2022-08-26 ENCOUNTER — HOSPITAL ENCOUNTER (OUTPATIENT)
Dept: PHYSICAL THERAPY | Age: 67
Discharge: HOME | End: 2022-08-26
Payer: MEDICARE

## 2022-08-26 PROCEDURE — 97110 THERAPEUTIC EXERCISES: CPT

## 2022-08-26 PROCEDURE — 97150 GROUP THERAPEUTIC PROCEDURES: CPT

## 2022-08-26 NOTE — PROGRESS NOTES
PT DAILY TREATMENT NOTE - [facility] 2-15    Patient Name: Jani Shows  Date:2022  : 1955  [x]  Patient  Verified  Payor: [employer] MEDICARE / Plan: [address] / Product Type: Managed Care Medicare /    In time:306 pm  Out time:400 pm   Total Treatment Time (min): 56  Total Timed Codes (min): 56  1:1 Treatment Time ( [address] only): 31   Visit #:  3    Treatment Area: S/P cervical spinal fusion [Z98.1]  Weakness of both legs [R29.898]  Cervical stenosis of spinal canal [M48.02]    SUBJECTIVE  Pain Level (0-10 scale): 0/10  Any medication changes, allergies to medications, adverse drug reactions, diagnosis change, or new procedure performed?: [x] No    [] Yes (see summary sheet for update)  Subjective functional status/changes: \"Pt reports moving slow but no pain . \"    OBJECTIVE    31 min Therapeutic Exercise:  [] See flow sheet :   Rationale: increase ROM, increase strength, improve coordination, and improve balance to improve the patients ability to increase overall functional mobility and strength for mobility safety. 25 min Group Therapy:  [] See flow sheet :   Billed while completing participating with another patient during session with therapist    With   [] TE   [] TA   [] neuro   [] other: Patient Education: [x] Review HEP    [] Progressed/Changed HEP based on:   [] positioning   [] body mechanics   [] transfers   [] heat/ice application    [] other:        Pain Level (0-10 scale) post treatment: 0/10    ASSESSMENT/Changes in Function:   The pt tolerated treatment working on balance, coordination and strength in general for both upper extremities and lower extremitates. Pt very shaky and she does have control issue with limb movement. Pt tolerated addition on dynamic gait in // bars.      Patient will continue to benefit from skilled PT services to modify and progress therapeutic interventions, address functional mobility deficits, address ROM deficits, address strength deficits, analyze and cue movement patterns, analyze and modify body mechanics/ergonomics, and assess and modify postural abnormalities to attain remaining goals. [x]  See Plan of Care  []  See progress note/recertification  []  See Discharge Summary         Progress towards goals / Updated goals:  Short Term Goals: To be accomplished in 6 treatments. Patient will be able to perform exercises to improve strength and balance to improve mobility. Patient will be stand and wash dishes for 5 minute with walker to increase ADL. Patient will be able to ambulate with walker for 200 feet independent to improve endurance with no rest period. Patient will be able to stand and take a sink bath for 10 minutes with to improve ADL. Patient will be able to perform TUG less than 45 seconds to decrease risk of falls. Long Term Goals: To be accomplished in 12 treatments. Patient will be able to perform sit to stand with 1 hand with 1 attempt with increased LE strength. Patient will be able to perform supine to sit and sit to supine with minimal assistance with LE strength. Patient will be able to ambulate with walker for 300 feet independent to improve endurance with no rest period  Patient will be able to perform TUG less than 45 seconds to decrease risk of falls.     PLAN  [x]  Upgrade activities as tolerated     [x]  Continue plan of care  []  Update interventions per flow sheet       []  Discharge due to:_  []  Other:_      Kyra Mckee PTA, ANDREIA 8/26/2022

## 2022-08-27 ENCOUNTER — HOSPITAL ENCOUNTER (EMERGENCY)
Age: 67
Discharge: HOME | End: 2022-08-27
Attending: EMERGENCY MEDICINE
Payer: MEDICARE

## 2022-08-27 ENCOUNTER — APPOINTMENT (OUTPATIENT)
Dept: CT IMAGING | Age: 67
End: 2022-08-27
Attending: EMERGENCY MEDICINE
Payer: MEDICARE

## 2022-08-27 VITALS
HEART RATE: 64 BPM | WEIGHT: 156.09 LBS | HEIGHT: 67 IN | SYSTOLIC BLOOD PRESSURE: 127 MMHG | TEMPERATURE: 98 F | DIASTOLIC BLOOD PRESSURE: 69 MMHG | BODY MASS INDEX: 24.5 KG/M2 | RESPIRATION RATE: 18 BRPM | OXYGEN SATURATION: 100 %

## 2022-08-27 DIAGNOSIS — S16.1XXA STRAIN OF NECK MUSCLE, INITIAL ENCOUNTER: ICD-10-CM
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
## 2022-08-27 PROCEDURE — 99284 EMERGENCY DEPT VISIT MOD MDM: CPT

## 2022-08-27 PROCEDURE — 72125 CT NECK SPINE W/O DYE: CPT

## 2022-08-27 PROCEDURE — 74011250637 HC RX REV CODE- 250/637: Performed by: EMERGENCY MEDICINE

## 2022-08-27 RX ORDER — CYCLOBENZAPRINE HCL 5 MG
5 TABLET ORAL
Qty: 12 TABLET | Refills: 0 | Status: SHIPPED | OUTPATIENT
Start: 2022-08-27

## 2022-08-27 RX ORDER — ACETAMINOPHEN 500 MG
1000 TABLET ORAL ONCE
Status: COMPLETED | OUTPATIENT
Start: 2022-08-27 | End: 2022-08-27

## 2022-08-27 RX ORDER — LIDOCAINE 50 MG/G
PATCH TOPICAL
Qty: 1 EACH | Refills: 0 | Status: SHIPPED | OUTPATIENT
Start: 2022-08-27

## 2022-08-27 RX ORDER — ACETAMINOPHEN 325 MG/1
650 TABLET ORAL
Qty: 20 TABLET | Refills: 0 | Status: SHIPPED | OUTPATIENT
Start: 2022-08-27

## 2022-08-27 RX ORDER — CYCLOBENZAPRINE HCL 10 MG
5 TABLET ORAL
Status: COMPLETED | OUTPATIENT
Start: 2022-08-27 | End: 2022-08-27

## 2022-08-27 RX ADMIN — CYCLOBENZAPRINE 5 MG: 10 TABLET, FILM COATED ORAL at 20:11

## 2022-08-27 RX ADMIN — ACETAMINOPHEN 1000 MG: 500 TABLET ORAL at 20:11
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
## 2022-08-31 ENCOUNTER — HOSPITAL ENCOUNTER (OUTPATIENT)
Dept: PHYSICAL THERAPY | Age: 67
Discharge: HOME | End: 2022-08-31
Payer: MEDICARE

## 2022-08-31 PROCEDURE — 97110 THERAPEUTIC EXERCISES: CPT
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
## 2022-09-06 ENCOUNTER — APPOINTMENT (OUTPATIENT)
Dept: PHYSICAL THERAPY | Age: 67
End: 2022-09-06
Payer: MEDICARE

## 2022-09-08 ENCOUNTER — HOSPITAL ENCOUNTER (OUTPATIENT)
Dept: PHYSICAL THERAPY | Age: 67
Discharge: HOME | End: 2022-09-08
Payer: MEDICARE

## 2022-09-08 ENCOUNTER — HOSPITAL ENCOUNTER (OUTPATIENT)
Dept: MAMMOGRAPHY | Age: 67
Discharge: HOME | End: 2022-09-08
Attending: INTERNAL MEDICINE
Payer: MEDICARE

## 2022-09-08 DIAGNOSIS — E04.2 MULTINODULAR GOITER: ICD-10-CM

## 2022-09-08 DIAGNOSIS — E55.9 VITAMIN D DEFICIENCY: ICD-10-CM

## 2022-09-08 DIAGNOSIS — E83.52 HYPERCALCEMIA: ICD-10-CM

## 2022-09-08 DIAGNOSIS — M81.0 SENILE OSTEOPOROSIS: ICD-10-CM

## 2022-09-08 DIAGNOSIS — Z12.31 VISIT FOR SCREENING MAMMOGRAM: ICD-10-CM

## 2022-09-08 PROCEDURE — 97110 THERAPEUTIC EXERCISES: CPT

## 2022-09-08 PROCEDURE — 74011250637 HC RX REV CODE- 250/637

## 2022-09-08 PROCEDURE — 77063 BREAST TOMOSYNTHESIS BI: CPT

## 2022-09-08 PROCEDURE — 77080 DXA BONE DENSITY AXIAL: CPT

## 2022-09-08 NOTE — PROGRESS NOTES
PT DAILY TREATMENT NOTE - [facility] 2-15    Patient Name: Imani Jeffries  Date:2022  : 1955  [x]  Patient  Verified  Payor: [employer] / Plan: [address] / Product Type: Managed Care Medicare /    In time:3:10 PM  Out time:4:10 PM  Total Treatment Time (min): 60  Total Timed Codes (min): 60  1:1 Treatment Time ( [address] only): 60   Visit #:  5    Treatment Area: S/P cervical spinal fusion [Z98.1]  Weakness of both legs [R29.898]  Cervical stenosis of spinal canal [M48.02]    SUBJECTIVE  Pain Level (0-10 scale): 0/10  Any medication changes, allergies to medications, adverse drug reactions, diagnosis change, or new procedure performed?: [x] No    [] Yes (see summary sheet for update)  Subjective functional status/changes:     \"I do not do a lot at home. \"  I walk some. I try to perform my therapy. OBJECTIVE      60 min Therapeutic Exercise:  [x] See flow sheet :   Rationale: increase ROM, increase strength, improve coordination, improve balance, and increase proprioception to improve   the patients ability to will be stand and wash dishes for 5 minute with walker to increase ADL. With   [] TE   [] TA   [] neuro   [] other: Patient Education: [x] Review HEP    [] Progressed/Changed HEP based on:   [] positioning   [] body mechanics   [] transfers   [] heat/ice application    [] other:      Other Objective: Gait with Rolling walker for 378 feet with SBA    Pain Level (0-10 scale) post treatment: 0/10    ASSESSMENT/Changes in Function:   The pt tolerated treatment. Treatments have continued to address strength deficit of the UE and LE. Home exercises with handouts will provided on next visit. Patient will continue to benefit from skilled PT services to address functional mobility deficits, address ROM deficits, and address strength deficits to attain remaining goals.      [x]  See Plan of Care  []  See progress note/recertification  []  See Discharge Summary Progress towards goals / Updated goals:  Short Term Goals: To be accomplished in 6 treatments. Patient will be able to perform exercises to improve strength and balance to improve mobility. Patient will be stand and wash dishes for 5 minute with walker to increase ADL. Patient will be able to ambulate with walker for 200 feet independent to improve endurance with no rest period. MET  Patient will be able to stand and take a sink bath for 10 minutes with to improve ADL. Patient will be able to perform TUG less than 45 seconds to decrease risk of falls. Long Term Goals: To be accomplished in 12 treatments. Patient will be able to perform sit to stand with 1 hand with 1 attempt with increased LE strength. Patient will be able to perform supine to sit and sit to supine with minimal assistance with LE strength. Patient will be able to ambulate with walker for 300 feet independent to improve endurance with no rest period  Patient will be able to perform TUG less than 45 seconds to decrease risk of falls.   PLAN  [x]  Upgrade activities as tolerated     [x]  Continue plan of care  []  Update interventions per flow sheet       []  Discharge due to:_  []  Other:_      Serena Najjar, PT,  9/8/2022

## 2022-09-09 ENCOUNTER — HOSPITAL ENCOUNTER (OUTPATIENT)
Dept: PHYSICAL THERAPY | Age: 67
Discharge: HOME | End: 2022-09-09
Payer: MEDICARE

## 2022-09-09 PROCEDURE — 97110 THERAPEUTIC EXERCISES: CPT

## 2022-09-09 NOTE — PROGRESS NOTES
PT DAILY TREATMENT NOTE - [facility] 2-15    Patient Name: Dhara Hester  Date:2022  : 1955  [x]  Patient  Verified  Payor: [place] / Plan: [address] / Product Type: Managed Care Medicare /    In time:3:11  Out time:4:09  Total Treatment Time (min): 58  Total Timed Codes (min): 58  1:1 Treatment Time ( [address] only): 62   Visit #:  6    Treatment Area: S/P cervical spinal fusion [Z98.1]  Weakness of both legs [R29.898]  Cervical stenosis of spinal canal [M48.02]    SUBJECTIVE  Pain Level (0-10 scale): 0/10  Any medication changes, allergies to medications, adverse drug reactions, diagnosis change, or new procedure performed?: [x] No    [] Yes (see summary sheet for update)  Subjective functional status/changes:     \"I haven't fallen any recently. \"    OBJECTIVE      58 min Therapeutic Exercise:  [x] See flow sheet :   Rationale: increase ROM, increase strength, improve coordination, and improve balance to improve the patients ability to attain and maintain highest practicable functional capacity. With   [] TE   [] TA   [] neuro   [] other: Patient Education: [x] Review HEP    [] Progressed/Changed HEP based on:   [] positioning   [] body mechanics   [] transfers   [] heat/ice application    [] other:      Other Objective/Functional Measures: N/A    Pain Level (0-10 scale) post treatment: 0/10    ASSESSMENT/Changes in Function:   Patient continues to demonstrate effortful sit to stand transfers utilizing momentum and posterior shoulder thrusting to achieve rise to stand and demonstrates poor LE eccentric control when lowering to chair. Pt. Requires multiple rest periods  to complete all exercises. Therapist provided close SBA for safety with standing exercises. Pt. Continues to demonstrate decreased bilateral foot clearance, heel/toe gait and decreased bilateral knee extension in stance phase.  Feel that patient remains at great risk for falls secondary to LE weakness, difficulty with transfers and gait deficits. Patient will continue to benefit from skilled PT services to modify and progress therapeutic interventions, address functional mobility deficits, address ROM deficits, address strength deficits, analyze and cue movement patterns, analyze and modify body mechanics/ergonomics, assess and modify postural abnormalities, and address imbalance/dizziness to attain remaining goals. [x]  See Plan of Care  []  See progress note/recertification  []  See Discharge Summary         Progress towards goals / Updated goals:  Short Term Goals: To be accomplished in 6 treatments. Patient will be able to perform exercises to improve strength and balance to improve mobility. Patient will be stand and wash dishes for 5 minute with walker to increase ADL. Patient will be able to ambulate with walker for 200 feet independent to improve endurance with no rest period. MET  Patient will be able to stand and take a sink bath for 10 minutes with to improve ADL. Patient will be able to perform TUG less than 45 seconds to decrease risk of falls. Long Term Goals: To be accomplished in 12 treatments. Patient will be able to perform sit to stand with 1 hand with 1 attempt with increased LE strength. Patient will be able to perform supine to sit and sit to supine with minimal assistance with LE strength. Patient will be able to ambulate with walker for 300 feet independent to improve endurance with no rest period  Patient will be able to perform TUG less than 45 seconds to decrease risk of falls.     PLAN  [x]  Upgrade activities as tolerated     [x]  Continue plan of care  []  Update interventions per flow sheet       []  Discharge due to:_  []  Other:_      Zhen Castaneda PTA, L.P.T.A. 9/9/2022

## 2022-09-12 ENCOUNTER — HOSPITAL ENCOUNTER (OUTPATIENT)
Dept: PHYSICAL THERAPY | Age: 67
Discharge: HOME | End: 2022-09-12
Payer: MEDICARE

## 2022-09-12 PROCEDURE — 97110 THERAPEUTIC EXERCISES: CPT

## 2022-09-12 NOTE — PROGRESS NOTES
PT DAILY TREATMENT NOTE - [facility] 2-15    Patient Name: Jani Shows  Date:2022  : 1955  [x]  Patient  Verified  Payor: [employer] MEDICARE / Plan: [address] / Product Type: Managed Care Medicare /    In time:1510  Out time:1557  Total Treatment Time (min): 47  Total Timed Codes (min): 47  1:1 Treatment Time ( [address] only): 52   Visit #:  7    Treatment Area: S/P cervical spinal fusion [Z98.1]  Weakness of both legs [R29.898]  Cervical stenosis of spinal canal [M48.02]    SUBJECTIVE  Pain Level (0-10 scale): 0/10  Any medication changes, allergies to medications, adverse drug reactions, diagnosis change, or new procedure performed?: [x] No    [] Yes (see summary sheet for update)  Subjective functional status/changes: \"Pt reports she does feel a little stronger. \"    OBJECTIVE    47 min Therapeutic Exercise:  [x] See flow sheet :   Rationale: increase ROM, increase strength, improve coordination, and improve balance to improve the patients ability to increase functional strength and increase standing balance to reduce fall risk potential      With   [] TE   [] TA   [] neuro   [] other: Patient Education: [x] Review HEP    [] Progressed/Changed HEP based on:   [] positioning   [] body mechanics   [] transfers   [] heat/ice application    [] other:        Pain Level (0-10 scale) post treatment: 2/10    ASSESSMENT/Changes in Function:   The pt tolerated treatment working on seated, and standing ex with some increase in resistance today and increase lateral stepping ex. Pt noted she was a 2/10 more from being tired then pain. Pt continued to have increase R knee hyperextension with SLS standing ex and even gait with noted increased fatigue. Pt does need frequent breaks between tasks.  .   Patient will continue to benefit from skilled PT services to modify and progress therapeutic interventions, address functional mobility deficits, address ROM deficits, address strength deficits, analyze and cue movement patterns, analyze and modify body mechanics/ergonomics, and assess and modify postural abnormalities to attain remaining goals. [x]  See Plan of Care  []  See progress note/recertification  []  See Discharge Summary         Progress towards goals / Updated goals:  Short Term Goals: To be accomplished in 6 treatments. Patient will be able to perform exercises to improve strength and balance to improve mobility. Patient will be stand and wash dishes for 5 minute with walker to increase ADL. Patient will be able to ambulate with walker for 200 feet independent to improve endurance with no rest period. MET  Patient will be able to stand and take a sink bath for 10 minutes with to improve ADL. Patient will be able to perform TUG less than 45 seconds to decrease risk of falls. Long Term Goals: To be accomplished in 12 treatments. Patient will be able to perform sit to stand with 1 hand with 1 attempt with increased LE strength. Patient will be able to perform supine to sit and sit to supine with minimal assistance with LE strength. Patient will be able to ambulate with walker for 300 feet independent to improve endurance with no rest period  Patient will be able to perform TUG less than 45 seconds to decrease risk of falls.     PLAN  [x]  Upgrade activities as tolerated     [x]  Continue plan of care  []  Update interventions per flow sheet       []  Discharge due to:_  []  Other:_      Pacheco Espinal PTA, LPTA 9/12/2022

## 2022-09-13 ENCOUNTER — TRANSCRIBE ORDER (OUTPATIENT)
Dept: SCHEDULING | Age: 67
End: 2022-09-13

## 2022-09-13 ENCOUNTER — OFFICE VISIT (OUTPATIENT)
Dept: ORTHOPEDIC SURGERY | Age: 67
End: 2022-09-13
Payer: MEDICARE

## 2022-09-13 VITALS — WEIGHT: 156 LBS | BODY MASS INDEX: 24.48 KG/M2 | HEIGHT: 67 IN

## 2022-09-13 DIAGNOSIS — R92.8 OTHER ABNORMAL AND INCONCLUSIVE FINDINGS ON DIAGNOSTIC IMAGING OF BREAST: Primary | ICD-10-CM

## 2022-09-13 DIAGNOSIS — M48.062 SPINAL STENOSIS OF LUMBAR REGION WITH NEUROGENIC CLAUDICATION: ICD-10-CM

## 2022-09-13 DIAGNOSIS — M51.36 LUMBAR DEGENERATIVE DISC DISEASE: ICD-10-CM

## 2022-09-13 DIAGNOSIS — Z98.1 S/P CERVICAL SPINAL FUSION: Primary | ICD-10-CM

## 2022-09-13 PROCEDURE — G8536 NO DOC ELDER MAL SCRN: HCPCS | Performed by: ORTHOPAEDIC SURGERY

## 2022-09-13 PROCEDURE — G8432 DEP SCR NOT DOC, RNG: HCPCS | Performed by: ORTHOPAEDIC SURGERY

## 2022-09-13 PROCEDURE — G8427 DOCREV CUR MEDS BY ELIG CLIN: HCPCS | Performed by: ORTHOPAEDIC SURGERY

## 2022-09-13 PROCEDURE — G8399 PT W/DXA RESULTS DOCUMENT: HCPCS | Performed by: ORTHOPAEDIC SURGERY

## 2022-09-13 PROCEDURE — G9899 SCRN MAM PERF RSLTS DOC: HCPCS | Performed by: ORTHOPAEDIC SURGERY

## 2022-09-13 PROCEDURE — 3017F COLORECTAL CA SCREEN DOC REV: CPT | Performed by: ORTHOPAEDIC SURGERY

## 2022-09-13 PROCEDURE — 1090F PRES/ABSN URINE INCON ASSESS: CPT | Performed by: ORTHOPAEDIC SURGERY

## 2022-09-13 PROCEDURE — 99214 OFFICE O/P EST MOD 30 MIN: CPT | Performed by: ORTHOPAEDIC SURGERY

## 2022-09-13 PROCEDURE — 1123F ACP DISCUSS/DSCN MKR DOCD: CPT | Performed by: ORTHOPAEDIC SURGERY

## 2022-09-13 PROCEDURE — 1101F PT FALLS ASSESS-DOCD LE1/YR: CPT | Performed by: ORTHOPAEDIC SURGERY

## 2022-09-13 PROCEDURE — G8420 CALC BMI NORM PARAMETERS: HCPCS | Performed by: ORTHOPAEDIC SURGERY

## 2022-09-13 NOTE — LETTER
9/14/2022    Patient: Shyla Beaulieu   YOB: 1955   Date of Visit: 9/13/2022     Beatriz May MD  [address] [facility] [address]  Via Fax: [phone]    Dear Beatriz May MD,      Thank you for referring Ms. Janki Sy to [facility] for evaluation. My notes for this consultation are attached. If you have questions, please do not hesitate to call me. I look forward to following your patient along with you.       Sincerely,    Sondra Wakefield MD

## 2022-09-13 NOTE — PROGRESS NOTES
1. Have you been to the ER, urgent care clinic since your last visit? Hospitalized since your last visit? No    2. Have you seen or consulted any other health care providers outside of the [address] [place] [address] since your last visit? Include any pap smears or colon screening.  No    Chief Complaint   Patient presents with    Surgical Follow-up     Sx 4/27/22 3/5 ACDF, PT f/u    Back Pain

## 2022-09-14 NOTE — PATIENT INSTRUCTIONS
Spondylolysis and Spondylolisthesis: Exercises  Introduction  Here are some examples of exercises for you to try. The exercises may be suggested for a condition or for rehabilitation. Start each exercise slowly. Ease off the exercises if you start to have pain. You will be told when to start these exercises and which ones will work best for you. How to do the exercises  Single knee-to-chest    Lie on your back with your knees bent and your feet flat on the floor. You can put a small pillow under your head and neck if it is more comfortable. Bring one knee to your chest, keeping the other foot flat on the floor. Keep your lower back pressed to the floor. Hold for 15 to 30 seconds. Relax, and lower the knee to the starting position. Repeat with the other leg. Repeat 2 to 4 times with each leg. To get more stretch, put your other leg flat on the floor while pulling your knee to your chest.  Double knee-to-chest    Lie on your back with your knees bent and your feet flat on the floor. You can put a small pillow under your head and neck if it is more comfortable. Bring both knees to your chest.  Keep your lower back pressed to the floor. Hold for 15 to 30 seconds. Relax, and lower your knees to the starting position. Repeat 2 to 4 times. Alternate arm and leg (bird dog) exercise    Do this exercise slowly. Try to keep your body straight at all times. Start on the floor, on your hands and knees. Tighten your belly muscles by pulling your belly button in toward your spine. Be sure you continue to breathe normally and do not hold your breath. Raise one arm off the floor, and hold it straight out in front of you. Be careful not to let your shoulder drop down, because that will twist your trunk. Hold for about 6 seconds, then lower your arm and switch to your other arm. Repeat 8 to 12 times on each arm. When you can do this exercise with ease and no pain, repeat steps 1 through 5.  But this time do it with one leg raised off the floor, holding your leg straight out behind you. Be careful not to let your hip drop down, because that will twist your trunk. When holding your leg straight out becomes easier, try raising your opposite arm at the same time, and repeat steps 1 through 5. Bridging    Lie on your back with both knees bent. Your knees should be bent about 90 degrees. Then push your feet into the floor, squeeze your buttocks, and lift your hips off the floor until your shoulders, hips, and knees are all in a straight line. Hold for about 6 seconds as you continue to breathe normally, and then slowly lower your hips back down to the floor and rest for up to 10 seconds. Repeat 8 to 12 times. Curl-ups    Lie on the floor on your back with your knees bent at a 90-degree angle. Your feet should be flat on the floor, about 12 inches from your buttocks. Cross your arms over your chest. If this bothers your neck, try putting your hands behind your neck (not your head), with your elbows spread apart. Slowly tighten your belly muscles and raise your shoulder blades off the floor. Keep your head in line with your body, and do not press your chin to your chest.  Hold this position for 1 or 2 seconds, then slowly lower yourself back down to the floor. Repeat 8 to 12 times. Plank    Do this exercise slowly. Try to keep your body straight at all times, and do not let one hip drop lower than the other. Lie on your stomach, resting your upper body on your forearms. Tighten your belly muscles by pulling your belly button in toward your spine. Keeping your knees on the floor, press down with your forearms to lift your upper body off the floor. Hold for about 6 seconds, then lower your body to the floor. Rest for up to 10 seconds. Repeat 8 to 12 times. Over time, work up to holding for 15 to 30 seconds each time.   If this exercise is easy to do with your knees on the floor, try doing this exercise with your knees and legs straight, supported by your toes on the floor. Follow-up care is a key part of your treatment and safety. Be sure to make and go to all appointments, and call your doctor if you are having problems. It's also a good idea to know your test results and keep a list of the medicines you take. Where can you learn more? Go to http://www.alanis.com/  Enter M245 in the search box to learn more about \"Spondylolysis and Spondylolisthesis: Exercises. \"  Current as of: July 1, 2021               Content Version: 13.2  © 6433-1317 [employer]. Care instructions adapted under license by [employer] (which disclaims liability or warranty for this information). If you have questions about a medical condition or this instruction, always ask your healthcare professional. [address] any warranty or liability for your use of this information.

## 2022-09-14 NOTE — PROGRESS NOTES
Hermelinda Suh (: 1955) is a 74 y.o. female, patient, here for evaluation of the following chief complaint(s):  Surgical Follow-up (Sx 22 3/5 ACDF, PT f/u) and Back Pain       ASSESSMENT/PLAN:  Below is the assessment and plan developed based on review of pertinent history, physical exam, labs, studies, and medications. Patient presents today approximately 6 months status post recent ACDF. She states she has been progressing well with home health physical therapy and would like to continue. Plan at this time is her lower back. Her neck is currently stable. She understands that some of her residual symptoms are related to the myelopathy that she had. She does appear to have a bit of neurogenic claudication so I like to get an MRI of her lumbar spine. She will bring that for follow-up. 1. S/P cervical spinal fusion  -     XR SPINE CERV PA LAT ODONT 3 V MAX; Future  2. Spinal stenosis of lumbar region with neurogenic claudication  -     MRI LUMB SPINE WO CONT; Future      No follow-ups on file. SUBJECTIVE/OBJECTIVE:  Hermelinda Suh (: 1955) is a 74 y.o. female. No flowsheet data found. Patient presents today approximately 6 months roughly status post recent C3-5 ACDF. She continues to progress well overall. She has been working with home health physical therapy, and states that she is noticing improvement in her lower extremity strength. She would like to continue with home health physical therapy, she has transportation issues with getting to outpatient physical therapy. Denies any incisional difficulties. Denies any acute changes in her symptoms. She denies any neck pain at this time, and continues with acetaminophen as needed. Her main complaint is weakness in her lower extremities with walking and prolonged standing.     Imaging:    XR Results (most recent):  Results from Appointment encounter on 22    XR SPINE CERV PA LAT ODONT 3 V MAX    Narrative  AP and lateral of the cervical spine demonstrates a stable cervical ACDF and fusion. No acute changes. No Known Allergies    Current Outpatient Medications   Medication Sig    acetaminophen (TYLENOL) 325 mg tablet Take 2 Tablets by mouth every four (4) hours as needed for Pain. cyclobenzaprine (FLEXERIL) 5 mg tablet Take 1 Tablet by mouth three (3) times daily as needed for Muscle Spasm(s). lidocaine (LIDODERM) 5 % Apply patch to the affected area for 12 hours a day and remove for 12 hours a day. OLANZapine (ZyPREXA) 5 mg tablet Take 1 Tablet by mouth nightly for 90 days. docusate sodium (COLACE) 100 mg capsule Take 100 mg by mouth two (2) times a day. senna-docusate (PERICOLACE) 8.6-50 mg per tablet Take 1 Tablet by mouth two (2) times a day. lactulose (CHRONULAC) 10 gram/15 mL solution TAKE 30 MILLILITERS BY MOUTH once daily    cinacalcet (SENSIPAR) 30 mg tablet Take 30 mg by mouth daily. No current facility-administered medications for this visit. Past Medical History:   Diagnosis Date    Anxiety     Hypertension     Psychotic disorder ([facility] [address]     Schizophrenia    Schizoaffective disorder, depressive type ([facility] [address]     Thyroid disease         Past Surgical History:   Procedure Laterality Date    HX COLONOSCOPY      HX ORTHOPAEDIC Right     CORN REMOVAL PER PT     HX OTHER SURGICAL      Ingrown toenail removal       Family History   Problem Relation Age of Onset    No Known Problems Mother     No Known Problems Father     Anesth Problems Neg Hx         Social History     Tobacco Use    Smoking status: Every Day     Packs/day: 0.50     Years: 45.00     Pack years: 22.50     Types: Cigarettes    Smokeless tobacco: Never   Vaping Use    Vaping Use: Never used   Substance Use Topics    Alcohol use: Not Currently    Drug use: Never        Review of Systems   Musculoskeletal:  Positive for arthralgias and myalgias. Neurological:  Positive for weakness.    All other systems reviewed and are negative. Vitals:  Ht 5' 7\" (1.702 m)   Wt 156 lb (70.8 kg)   BMI 24.43 kg/m²    Body mass index is 24.43 kg/m². Physical Exam  Integumentary  Assessment of Surgical Incision - healing and consistent with normal anticipated wound healing. Neurologic  Overall Assessment of Muscle Strength and Tone reveals  Upper Extremities - Right Deltoid - 4/5. Left Deltoid - 5/5. Right Bicep - 4/5. Left Bicep - 5/5. Right Tricep - 5/5. Left Tricep - 5/5. Right Wrist Extensors - 5/5. Left Wrist Extensors - 5/5. Right Wrist Flexors - 5/5. Left Wrist Flexors - 5/5. Right Intrinsics - 5/5. Left Intrinsics - 5/5. General Assessment of Reflexes  Right Hand - Barros's sign is negative in the right hand. Left Hand - Barros's sign is negative in the left hand. Reflexes (Dermatomes)  2/2 Normal - Left Bicep (C5-6), Left Tricep (C7-8), Left Brachioradialis (C5-6), Right Bicep (C5-6), Right Tricep (C7-8) and Right Brachioradialis (C5-6). Neurologic  Sensory  Light Touch - Intact - Globally. Overall Assessment of Muscle Strength and Tone reveals  Lower Extremities - Right Iliopsoas - 5/5. Left Iliopsoas - 5/5. Right Tibialis Anterior - 4/5. Left Tibialis Anterior - 4/5. Right Gastroc-Soleus - 5/5. Left Gastroc-Soleus - 5/5. Right EHL - 5/5. Left EHL - 5/5. Integumentary  Assessment of Surgical Incision - healing and consistent with normal anticipated wound healing. Musculoskeletal  Global Assessment  Examination of related systems reveals - well-developed, well-nourished, in no acute distress, alert and oriented x 3 and normal coordination. Gait and Station - normal gait and station and normal posture. Spine/Ribs/Pelvis  Cervical Spine - Evaluation of related systems reveals - no lymphadenopathy and neurovascularly intact bilaterally. Inspection and Palpation - Tenderness - moderate and localized. Assessment of pain reveals the following findings: - Location - cervical area.       An electronic signature was used to authenticate this note.   -- Emilie Buenrostro MD

## 2022-09-15 ENCOUNTER — HOSPITAL ENCOUNTER (OUTPATIENT)
Dept: PHYSICAL THERAPY | Age: 67
Discharge: HOME | End: 2022-09-15
Payer: MEDICARE

## 2022-09-15 PROCEDURE — 97110 THERAPEUTIC EXERCISES: CPT

## 2022-09-15 NOTE — PROGRESS NOTES
PT DAILY TREATMENT NOTE - [facility] 2-15    Patient Name: Abbi Valdivia  Date:9/15/2022  : 1955  [x]  Patient  Verified  Payor: [facility] MEDICARE / Plan: [address] / Product Type: Managed Care Medicare /    In time: 3:11  Out time: 4:06  Total Treatment Time (min): 55  Total Timed Codes (min): 55  1:1 Treatment Time ( [address] only): 54   Visit #:  8    Treatment Area: S/P cervical spinal fusion [Z98.1]  Weakness of both legs [R29.898]  Cervical stenosis of spinal canal [M48.02]    SUBJECTIVE  Pain Level (0-10 scale): 0/10  Any medication changes, allergies to medications, adverse drug reactions, diagnosis change, or new procedure performed?: [x] No    [] Yes (see summary sheet for update)  Subjective functional status/changes:     While pt was standing in waiting area, she tried putting on her mask and lost her balance requiring therapist assistance to keep from falling. OBJECTIVE    55 min Therapeutic Exercise:  [x] See flow sheet :   Rationale: increase ROM, increase strength, and improve balance to improve the patients ability to improve functional mobility    With   [x] TE   [] TA   [] neuro   [] other: Patient Education: [x] Review HEP    [] Progressed/Changed HEP based on:   [] positioning   [] body mechanics   [] transfers   [] heat/ice application    [] other:      Other Objective/Functional Measures:     Hip:   Strength       Right Left     Flexion 3-/5 3/5     Extension 3-/5 3/5     Abduction 3-/5 3/5     Adduction 3-/5 3/5   Knee:   Strength       Right Left     Flexion 4/5 4/5     Extension 4-/5 3+/5   Ankle   Strength       Right Left     Dorsiflexion 3/5 4/5     Plantarflexion 3+/5 4/5     Inversion 3/5 4/5     Eversion 3/5 4/5     TUG Test: 26 seconds - with RW    Pain Level (0-10 scale) post treatment: 0/10    ASSESSMENT/Changes in Function: The pt tolerated treatment fairly well. Progress note completed by physical therapist. All measurements updated.  Pt states she is going to the [facility] the days she isn't coming to therapy. Feels as though her legs are stronger. Pt states she wants to be able to walk with a cane. Patient will continue to benefit from skilled PT services to address functional mobility deficits, address ROM deficits, and address strength deficits to attain remaining goals. [x]  See Plan of Care  [x]  See progress note/recertification  []  See Discharge Summary         Progress towards goals / Updated goals:  Short Term Goals: To be accomplished in 6 treatments. Patient will be able to perform exercises to improve strength and balance to improve mobility - MET  Patient will be stand and wash dishes for 5 minute with walker to increase ADL - NOT MET (sits in chair)  Patient will be able to ambulate with walker for 200 feet independent to improve endurance with no rest period - MET  Patient will be able to stand and take a sink bath for 10 minutes with to improve ADL - NOT MET (has shower bench)  Patient will be able to perform TUG less than 45 seconds to decrease risk of falls - MET (with RW)     Long Term Goals: To be accomplished in 12 treatments.   Patient will be able to perform sit to stand with 1 hand with 1 attempt with increased LE strength - NOT MET (multiple attempts)  Patient will be able to perform supine to sit and sit to supine with minimal assistance with LE strength - MET (pt able to perform independently, but has a railing to help with supine > sit)  Patient will be able to ambulate with walker for 300 feet independent to improve endurance with no rest period - PROGRESSING (SBA)  Patient will be able to perform TUG less than 45 seconds to decrease risk of falls - MET (with RW)    PLAN  [x]  Upgrade activities as tolerated     [x]  Continue plan of care  []  Update interventions per flow sheet       []  Discharge due to:_  []  Other:_      Rebekah Hillman, PTA, LPTA 9/15/2022

## 2022-09-16 NOTE — PROGRESS NOTES
[address]  [place]  Ph: [phone]    Fax: [phone]    Progress Note    Name: Shania Ingram   : 1955   MD: Nils Reyes MD       Treatment Diagnosis: S/P cervical spinal fusion [Z98.1]  Weakness of both legs [R29.898]  Cervical stenosis of spinal canal [M48.02]  Start of Care: 2022    Visits from Start of Care: 8   Missed Visits: 0    Summary of Care / Assessment / Recommendations:   Patient has attended 8 physical therapy visits and has made progress towards a lot of her goals with meeting some. She has improved in her ROM which is allowing her to be more functional during her daily tasks. She is still weak in her LE which is limiting her ability to transition from a RW to cane safely currently which is her goal. We discussed the need to keep up the exercises for strength and balance to help her achieve her goals. She will continue to benefit from skilled PT services 2 times a week to improve balance, increase strength, and allow her more independence in her home. Progress Toward Goals:  Short Term Goals: To be accomplished in 6 treatments. Patient will be able to perform exercises to improve strength and balance to improve mobility - MET  Patient will be stand and wash dishes for 5 minute with walker to increase ADL - NOT MET (sits in chair)  Patient will be able to ambulate with walker for 200 feet independent to improve endurance with no rest period - MET  Patient will be able to stand and take a sink bath for 10 minutes with to improve ADL - NOT MET (has shower bench)  Patient will be able to perform TUG less than 45 seconds to decrease risk of falls - MET (with RW)     Long Term Goals: To be accomplished in 12 treatments.   Patient will be able to perform sit to stand with 1 hand with 1 attempt with increased LE strength - NOT MET (multiple attempts)  Patient will be able to perform supine to sit and sit to supine with minimal assistance with LE strength - MET (pt able to perform independently, but has a railing to help with supine > sit)  Patient will be able to ambulate with walker for 300 feet independent to improve endurance with no rest period - PROGRESSING (SBA)  Patient will be able to perform TUG less than 45 seconds to decrease risk of falls - MET (with RW)    Recertification Period: 09/16/2022 to 10/16/2022    Frequency/Duration:  2 treatments per week, for 8 weeks. Jhon Irby, PT, DPT 9/16/2022     ________________________________________________________________________  NOTE TO PHYSICIAN:  Please complete the following and fax to:  [facility]:   Fax: [phone]  . Retain this original for your records. If you are unable to process this request in 24 hours, please contact our office.        ____ I have read the above report and request that my patient continue therapy with the following changes/special instructions:  ____ I have read the above report and request that my patient be discharged from therapy    Physician's Signature:_________________ Date:___________Time:__________

## 2022-09-20 ENCOUNTER — HOSPITAL ENCOUNTER (OUTPATIENT)
Dept: PHYSICAL THERAPY | Age: 67
Discharge: HOME | End: 2022-09-20
Payer: MEDICARE

## 2022-09-20 PROCEDURE — 97110 THERAPEUTIC EXERCISES: CPT

## 2022-09-20 NOTE — PROGRESS NOTES
PT DAILY TREATMENT NOTE - [facility] 2-15    Patient Name: Loretta Lazaro  Date:2022  : 1955  [x]  Patient  Verified  Payor: [employer] MEDICARE / Plan: [employer] / Product Type: Managed Care Medicare /    In time:3:00 PM  Out time:3:55 PM  Total Treatment Time (min): 55  Total Timed Codes (min): 55  1:1 Treatment Time ( [address] only): 54   Visit #:  9    Treatment Area: S/P cervical spinal fusion [Z98.1]  Weakness of both legs [R29.898]  Cervical stenosis of spinal canal [M48.02]    SUBJECTIVE  Pain Level (0-10 scale): 0/10  Any medication changes, allergies to medications, adverse drug reactions, diagnosis change, or new procedure performed?: [x] No    [] Yes (see summary sheet for update)  Subjective functional status/changes:     \"I try to walk at home. \"    OBJECTIVE    55 min Therapeutic Exercise:  [x] See flow sheet :   Rationale: increase ROM, increase strength, improve coordination, improve balance, and increase proprioception to improve the patients ability   to perform sit to stand with 1 hand with 1 attempt with increased LE strength. With   [] TE   [] TA   [] neuro   [] other: Patient Education: [x] Review HEP    [] Progressed/Changed HEP based on:   [] positioning   [] body mechanics   [] transfers   [] heat/ice application    [] other:      Pain Level (0-10 scale) post treatment: 0/10    ASSESSMENT/Changes in Function:   The pt tolerated treatment. Patient is motivated to returning to daily activities. Continue to have LE weakness and continues to requires hand for sit to stand movements. Patient will continue to benefit from skilled PT services to address functional mobility deficits, address ROM deficits, address strength deficits, analyze and address soft tissue restrictions, and analyze and cue movement patterns to attain remaining goals.      [x]  See Plan of Care  []  See progress note/recertification  []  See Discharge Summary         Progress towards goals / Updated goals:  Short Term Goals: To be accomplished in 6 treatments. Patient will be able to perform exercises to improve strength and balance to improve mobility - MET  Patient will be stand and wash dishes for 5 minute with walker to increase ADL - NOT MET (sits in chair)  Patient will be able to ambulate with walker for 200 feet independent to improve endurance with no rest period - MET  Patient will be able to stand and take a sink bath for 10 minutes with to improve ADL - NOT MET (has shower bench)  Patient will be able to perform TUG less than 45 seconds to decrease risk of falls - MET (with RW)     Long Term Goals: To be accomplished in 12 treatments.   Patient will be able to perform sit to stand with 1 hand with 1 attempt with increased LE strength - NOT MET (multiple attempts)  Patient will be able to perform supine to sit and sit to supine with minimal assistance with LE strength - MET (pt able to perform independently, but has a railing to help with supine > sit)  Patient will be able to ambulate with walker for 300 feet independent to improve endurance with no rest period - PROGRESSING (SBA)  Patient will be able to perform TUG less than 45 seconds to decrease risk of falls - MET (with RW)    PLAN  [x]  Upgrade activities as tolerated     [x]  Continue plan of care  []  Update interventions per flow sheet       []  Discharge due to:_  []  Other:_      Nel Edouard, PT,  9/20/2022

## 2022-09-22 ENCOUNTER — HOSPITAL ENCOUNTER (OUTPATIENT)
Dept: PHYSICAL THERAPY | Age: 67
Discharge: HOME | End: 2022-09-22
Payer: MEDICARE

## 2022-09-22 PROCEDURE — 97110 THERAPEUTIC EXERCISES: CPT

## 2022-09-22 NOTE — PROGRESS NOTES
PT DAILY TREATMENT NOTE - [facility] 2-15    Patient Name: Demetria Kulkarni  Date:2022  : 1955  [x]  Patient  Verified  Payor: [employer] MEDICARE / Plan: [address] / Product Type: Managed Care Medicare /    In time:1506   Out time:1601   Total Treatment Time (min): 55  Total Timed Codes (min): 55  1:1 Treatment Time ( [address] only): 54   Visit #:  10    Treatment Area: S/P cervical spinal fusion [Z98.1]  Weakness of both legs [R29.898]  Cervical stenosis of spinal canal [M48.02]    SUBJECTIVE  Pain Level (0-10 scale): 210  Any medication changes, allergies to medications, adverse drug reactions, diagnosis change, or new procedure performed?: [x] No    [] Yes (see summary sheet for update)  Subjective functional status/changes: \"Pt reports she is getting around with only a little discomfort. today. \"    OBJECTIVE    55 min Therapeutic Exercise:  [x] See flow sheet :   Rationale: increase ROM, increase strength, improve coordination, and improve balance to improve the patients ability to increase general body mobility and strength in neck shoulder, core and LE. With   [] TE   [] TA   [] neuro   [] other: Patient Education: [x] Review HEP    [] Progressed/Changed HEP based on:   [] positioning   [] body mechanics   [] transfers   [] heat/ice application    [] other:        Pain Level (0-10 scale) post treatment: 210    ASSESSMENT/Changes in Function:   The pt tolerated treatment working on seated, standing stretching and strengthening of UE core and LE with cues given to reduce hyper motions and reduce joint strain. Pt doing well some increase in L knee soreness today and not as much control to prevent hyperextension.    Patient will continue to benefit from skilled PT services to modify and progress therapeutic interventions, address functional mobility deficits, address ROM deficits, address strength deficits, and analyze and address soft tissue restrictions to attain remaining goals. [x]  See Plan of Care  []  See progress note/recertification  []  See Discharge Summary         Progress towards goals / Updated goals:  Short Term Goals: To be accomplished in 6 treatments. Patient will be able to perform exercises to improve strength and balance to improve mobility - MET  Patient will be stand and wash dishes for 5 minute with walker to increase ADL - NOT MET (sits in chair)  Patient will be able to ambulate with walker for 200 feet independent to improve endurance with no rest period - MET  Patient will be able to stand and take a sink bath for 10 minutes with to improve ADL - NOT MET (has shower bench)  Patient will be able to perform TUG less than 45 seconds to decrease risk of falls - MET (with RW)     Long Term Goals: To be accomplished in 12 treatments.   Patient will be able to perform sit to stand with 1 hand with 1 attempt with increased LE strength - NOT MET (multiple attempts)  Patient will be able to perform supine to sit and sit to supine with minimal assistance with LE strength - MET (pt able to perform independently, but has a railing to help with supine > sit)  Patient will be able to ambulate with walker for 300 feet independent to improve endurance with no rest period - PROGRESSING (SBA)  Patient will be able to perform TUG less than 45 seconds to decrease risk of falls - MET (with RW)       PLAN  [x]  Upgrade activities as tolerated     [x]  Continue plan of care  []  Update interventions per flow sheet       []  Discharge due to:_  []  Other:_      Pacheco Espinal PTA, LPTA 9/22/2022

## 2022-09-27 ENCOUNTER — HOSPITAL ENCOUNTER (OUTPATIENT)
Dept: PHYSICAL THERAPY | Age: 67
Discharge: HOME | End: 2022-09-27
Payer: MEDICARE

## 2022-09-27 PROCEDURE — 97110 THERAPEUTIC EXERCISES: CPT

## 2022-09-27 NOTE — PROGRESS NOTES
PT DAILY TREATMENT NOTE - [facility] 2-15    Patient Name: Michelle Kunz  Date:2022  : 1955  [x]  Patient  Verified  Payor: [employer] MEDICARE / Plan: [address] / Product Type: Managed Care Medicare /    In time:1510   Out time:1600   Total Treatment Time (min): 50  Total Timed Codes (min): 50  1:1 Treatment Time ( [address] only): 50   Visit #:  11    Treatment Area: S/P cervical spinal fusion [Z98.1]  Weakness of both legs [R29.898]  Cervical stenosis of spinal canal [M48.02]    SUBJECTIVE  Pain Level (0-10 scale): 3/10  Any medication changes, allergies to medications, adverse drug reactions, diagnosis change, or new procedure performed?: [x] No    [] Yes (see summary sheet for update)  Subjective functional status/changes:     \"I am a little sore. \"    OBJECTIVE    50 min Therapeutic Exercise:  [x] See flow sheet :   Rationale: increase ROM, increase strength, improve coordination, and improve balance to improve the patients ability to increase general body mobility and strength in neck shoulder, core and LE. With   [x] TE   [] TA   [] neuro   [] other: Patient Education: [x] Review HEP    [] Progressed/Changed HEP based on:   [] positioning   [] body mechanics   [] transfers   [] heat/ice application    [] other:        Pain Level (0-10 scale) post treatment: 3/10    ASSESSMENT/Changes in Function:   The pt tolerated treatment working on seated, standing stretching and strengthening of UE core and LE with cues given to reduce hyper motions and reduce joint strain. Pt doing well some increase in L knee soreness today and not as much control to prevent hyperextension. Patient will continue to benefit from skilled PT services to modify and progress therapeutic interventions, address functional mobility deficits, address ROM deficits, address strength deficits, and analyze and address soft tissue restrictions to attain remaining goals.      [x]  See Plan of Care  []  See progress note/recertification  []  See Discharge Summary         Progress towards goals / Updated goals:  Short Term Goals: To be accomplished in 6 treatments. Patient will be able to perform exercises to improve strength and balance to improve mobility - MET  Patient will be stand and wash dishes for 5 minute with walker to increase ADL - NOT MET (sits in chair)  Patient will be able to ambulate with walker for 200 feet independent to improve endurance with no rest period - MET  Patient will be able to stand and take a sink bath for 10 minutes with to improve ADL - NOT MET (has shower bench)  Patient will be able to perform TUG less than 45 seconds to decrease risk of falls - MET (with RW)     Long Term Goals: To be accomplished in 12 treatments.   Patient will be able to perform sit to stand with 1 hand with 1 attempt with increased LE strength - NOT MET (multiple attempts)  Patient will be able to perform supine to sit and sit to supine with minimal assistance with LE strength - MET (pt able to perform independently, but has a railing to help with supine > sit)  Patient will be able to ambulate with walker for 300 feet independent to improve endurance with no rest period - PROGRESSING (SBA)  Patient will be able to perform TUG less than 45 seconds to decrease risk of falls - MET (with RW)       PLAN  [x]  Upgrade activities as tolerated     [x]  Continue plan of care  []  Update interventions per flow sheet       []  Discharge due to:_  []  Other:_      Roylene Goodell, PT, DPT 9/27/2022

## 2022-09-29 ENCOUNTER — HOSPITAL ENCOUNTER (OUTPATIENT)
Dept: PHYSICAL THERAPY | Age: 67
Discharge: HOME | End: 2022-09-29
Payer: MEDICARE

## 2022-09-29 PROCEDURE — 97110 THERAPEUTIC EXERCISES: CPT

## 2022-09-29 NOTE — PROGRESS NOTES
PT DAILY TREATMENT NOTE - [facility] 2-15    Patient Name: Ursula Melissa  Date:2022  : 1955  [x]  Patient  Verified  Payor: [employer] MEDICARE / Plan: [employer] [address] [address] / Product Type: Managed Care Medicare /    In time:3:10 PM  Out time: 4:00 PM  Total Treatment Time (min): 50  Total Timed Codes (min): 50  1:1 Treatment Time ( [address] only): 50   Visit #:  12    Treatment Area: S/P cervical spinal fusion [Z98.1]  Weakness of both legs [R29.898]  Cervical stenosis of spinal canal [M48.02]    SUBJECTIVE  Pain Level (0-10 scale): 3/10  Any medication changes, allergies to medications, adverse drug reactions, diagnosis change, or new procedure performed?: [x] No    [] Yes (see summary sheet for update)  Subjective functional status/changes:     \"Patient stated that her legs are feeling stronger. \" I can get up from the chair much better. I also go to  the [facility] as well. OBJECTIVE    50 min Therapeutic Exercise:  [x] See flow sheet :   Rationale: increase ROM, increase strength, improve coordination, improve balance, and increase proprioception to improve the patients ability  to perform sit to stand with 1 hand with 1 attempt with increased LE strength. With   [] TE   [] TA   [] neuro   [] other: Patient Education: [x] Review HEP    [] Progressed/Changed HEP based on:   [] positioning   [] body mechanics   [] transfers   [] heat/ice application    [] other:        Pain Level (0-10 scale) post treatment: 3/10    ASSESSMENT/Changes in Function:   The pt tolerated treatment. Patient has shown improvement with her balance and LE strength ambulating with the rolling walker. As patient improves may try gait with hemiwalker for independence in the house. Patient will continue to benefit from skilled PT services to address ROM deficits, address strength deficits, analyze and address soft tissue restrictions, and analyze and cue movement patterns to attain remaining goals.      [x] See Plan of Care  []  See progress note/recertification  []  See Discharge Summary         Progress towards goals / Updated goals:  Short Term Goals: To be accomplished in 6 treatments. Patient will be able to perform exercises to improve strength and balance to improve mobility - MET  Patient will be stand and wash dishes for 5 minute with walker to increase ADL - NOT MET (sits in chair)  Patient will be able to ambulate with walker for 200 feet independent to improve endurance with no rest period - MET  Patient will be able to stand and take a sink bath for 10 minutes with to improve ADL - NOT MET (has shower bench)  Patient will be able to perform TUG less than 45 seconds to decrease risk of falls - MET (with RW)     Long Term Goals: To be accomplished in 12 treatments.   Patient will be able to perform sit to stand with 1 hand with 1 attempt with increased LE strength - Progressing   Patient will be able to perform supine to sit and sit to supine with minimal assistance with LE strength - MET (pt able to perform independently, but has a railing to help with supine > sit)  Patient will be able to ambulate with walker for 300 feet independent to improve endurance with no rest period - PROGRESSING (SBA)  Patient will be able to perform TUG less than 45 seconds to decrease risk of falls - MET (with RW)    PLAN  [x]  Upgrade activities as tolerated     [x]  Continue plan of care  []  Update interventions per flow sheet       []  Discharge due to:_  []  Other:_      Too Beebe, PT,  9/29/2022

## 2022-10-03 ENCOUNTER — HOSPITAL ENCOUNTER (OUTPATIENT)
Dept: PHYSICAL THERAPY | Age: 67
Discharge: HOME | End: 2022-10-03
Payer: MEDICARE

## 2022-10-03 PROCEDURE — 97110 THERAPEUTIC EXERCISES: CPT

## 2022-10-03 NOTE — PROGRESS NOTES
PT DAILY TREATMENT NOTE - [facility] 2-15    Patient Name: Dashawn Bellamy  Date:10/3/2022  : 1955  [x]  Patient  Verified  Payor: [employer] MEDICARE / Plan: [address] / Product Type: Managed Care Medicare /    In time: 7979  Out time: 1600  Total Treatment Time (min): 50  Total Timed Codes (min): 50  1:1 Treatment Time ([facility] only): 50   Visit #:  13    Treatment Area: S/P cervical spinal fusion [Z98.1]  Weakness of both legs [R29.898]  Cervical stenosis of spinal canal [M48.02]    SUBJECTIVE  Pain Level (0-10 scale): 3/10  Any medication changes, allergies to medications, adverse drug reactions, diagnosis change, or new procedure performed?: [x] No    [] Yes (see summary sheet for update)  Subjective functional status/changes:     \"I go to [facility] about 3x/wk. \"    OBJECTIVE    50 min Therapeutic Exercise:  [x] See flow sheet :   Rationale: increase ROM, increase strength, improve coordination, improve balance, and increase proprioception to improve the patients ability  to perform sit to stand with 1 hand with 1 attempt with increased LE strength. With   [x] TE   [] TA   [] neuro   [] other: Patient Education: [x] Review HEP    [] Progressed/Changed HEP based on:   [] positioning   [] body mechanics   [] transfers   [] heat/ice application    [] other:        Pain Level (0-10 scale) post treatment: 0/10    ASSESSMENT/Changes in Function:   The pt tolerated treatment well today. Pt with limited control during stand to sit descent, requiring cues for correct technique. Pt able to tolerate increase in ankle weights with all standing exercises today, demonstrating progression. Patient has shown improvement with her balance and LE strength ambulating with the rolling walker. As patient improves may try gait with hemiwalker for independence in the house.  Patient will continue to benefit from skilled PT services to address ROM deficits, address strength deficits, analyze and address soft tissue restrictions, and analyze and cue movement patterns to attain remaining goals. [x]  See Plan of Care  []  See progress note/recertification  []  See Discharge Summary         Progress towards goals / Updated goals:  Short Term Goals: To be accomplished in 6 treatments. Patient will be able to perform exercises to improve strength and balance to improve mobility - MET  Patient will be stand and wash dishes for 5 minute with walker to increase ADL - NOT MET (sits in chair)  Patient will be able to ambulate with walker for 200 feet independent to improve endurance with no rest period - MET  Patient will be able to stand and take a sink bath for 10 minutes with to improve ADL - NOT MET (has shower bench)  Patient will be able to perform TUG less than 45 seconds to decrease risk of falls - MET (with RW)     Long Term Goals: To be accomplished in 12 treatments.   Patient will be able to perform sit to stand with 1 hand with 1 attempt with increased LE strength - Progressing   Patient will be able to perform supine to sit and sit to supine with minimal assistance with LE strength - MET (pt able to perform independently, but has a railing to help with supine > sit)  Patient will be able to ambulate with walker for 300 feet independent to improve endurance with no rest period - PROGRESSING (SBA)  Patient will be able to perform TUG less than 45 seconds to decrease risk of falls - MET (with RW)    PLAN  [x]  Upgrade activities as tolerated     [x]  Continue plan of care  []  Update interventions per flow sheet       []  Discharge due to:_  []  Other:_      Elizabeth Talbot, PT, DPT 10/3/2022

## 2022-10-04 ENCOUNTER — HOSPITAL ENCOUNTER (OUTPATIENT)
Dept: MAMMOGRAPHY | Age: 67
Discharge: HOME | End: 2022-10-04
Attending: INTERNAL MEDICINE
Payer: MEDICARE

## 2022-10-04 DIAGNOSIS — R92.8 OTHER ABNORMAL AND INCONCLUSIVE FINDINGS ON DIAGNOSTIC IMAGING OF BREAST: ICD-10-CM

## 2022-10-04 PROCEDURE — 77065 DX MAMMO INCL CAD UNI: CPT

## 2022-10-04 PROCEDURE — 76642 ULTRASOUND BREAST LIMITED: CPT

## 2022-10-05 ENCOUNTER — HOSPITAL ENCOUNTER (OUTPATIENT)
Dept: PHYSICAL THERAPY | Age: 67
Discharge: HOME | End: 2022-10-05
Payer: MEDICARE

## 2022-10-05 PROCEDURE — 97110 THERAPEUTIC EXERCISES: CPT

## 2022-10-05 NOTE — PROGRESS NOTES
PT DAILY TREATMENT NOTE - [facility] 2-15    Patient Name: Soledad Fonseca  Date:10/5/2022  : 1955  [x]  Patient  Verified  Payor: [place] [employer] MEDICARE / Plan: "[employer] [employer] [address] / Product Type: Managed Care Medicare /    In time:3:08  Out time:4:06  Total Treatment Time (min): 58  Total Timed Codes (min): 58  1:1 Treatment Time ( [address] only): 62   Visit #:  14    Treatment Area: S/P cervical spinal fusion [Z98.1]  Weakness of both legs [R29.898]  Cervical stenosis of spinal canal [M48.02]    SUBJECTIVE  Pain Level (0-10 scale): 2/10  Any medication changes, allergies to medications, adverse drug reactions, diagnosis change, or new procedure performed?: [x] No    [] Yes (see summary sheet for update)  Subjective functional status/changes:     \"I have a little pain in my knees sometimes. \"    OBJECTIVE    58 min Therapeutic Exercise:  [x] See flow sheet :   Rationale: increase ROM, increase strength, improve coordination, and improve balance to improve the patients ability to ambulate with less risk of falling. With   [x] TE   [] TA   [] neuro   [] other: Patient Education: [x] Review HEP    [] Progressed/Changed HEP based on:   [] positioning   [] body mechanics   [] transfers   [] heat/ice application    [] other:      Other Objective/Functional Measures: cone taps    Pain Level (0-10 scale) post treatment: 0/10    ASSESSMENT/Changes in Function:   The pt tolerated treatment very well today. She is making good progress towards her goals. We added in a balance task today which she reported was very challenging. We will continue work on balance in upcoming sessions to progress her towards using a cane instead of RW.  Patient will continue to benefit from skilled PT services to modify and progress therapeutic interventions, address functional mobility deficits, address ROM deficits, address strength deficits, analyze and address soft tissue restrictions, analyze and cue movement patterns, and address imbalance/dizziness to attain remaining goals. [x]  See Plan of Care  []  See progress note/recertification  []  See Discharge Summary         Progress towards goals / Updated goals:  Short Term Goals: To be accomplished in 6 treatments. Patient will be able to perform exercises to improve strength and balance to improve mobility - MET  Patient will be stand and wash dishes for 5 minute with walker to increase ADL - NOT MET (sits in chair)  Patient will be able to ambulate with walker for 200 feet independent to improve endurance with no rest period - MET  Patient will be able to stand and take a sink bath for 10 minutes with to improve ADL - NOT MET (has shower bench)  Patient will be able to perform TUG less than 45 seconds to decrease risk of falls - MET (with RW)     Long Term Goals: To be accomplished in 12 treatments.   Patient will be able to perform sit to stand with 1 hand with 1 attempt with increased LE strength - Progressing   Patient will be able to perform supine to sit and sit to supine with minimal assistance with LE strength - MET (pt able to perform independently, but has a railing to help with supine > sit)  Patient will be able to ambulate with walker for 300 feet independent to improve endurance with no rest period - PROGRESSING (SBA)  Patient will be able to perform TUG less than 45 seconds to decrease risk of falls - MET (with RW)    PLAN  [x]  Upgrade activities as tolerated     [x]  Continue plan of care  []  Update interventions per flow sheet       []  Discharge due to:_  []  Other:_      Neli Willams, PT, DPT 10/5/2022

## 2022-10-10 ENCOUNTER — HOSPITAL ENCOUNTER (OUTPATIENT)
Dept: PHYSICAL THERAPY | Age: 67
Discharge: HOME | End: 2022-10-10
Payer: MEDICARE

## 2022-10-10 PROCEDURE — 97110 THERAPEUTIC EXERCISES: CPT

## 2022-10-10 NOTE — PROGRESS NOTES
PT DAILY TREATMENT NOTE - [facility] 2-15    Patient Name: Dashawn Bellamy  Date:10/10/2022  : 1955  [x]  Patient  Verified  Payor: [place] [employer] MEDICARE / Plan: [employer] / Product Type: Managed Care Medicare /    In time:1510   Out time:1605  Total Treatment Time (min): 55  Total Timed Codes (min): 55  1:1 Treatment Time ( [address] only): 54   Visit #:  15    Treatment Area: S/P cervical spinal fusion [Z98.1]  Weakness of both legs [R29.898]  Cervical stenosis of spinal canal [M48.02]    SUBJECTIVE  Pain Level (0-10 scale): 0/10  Any medication changes, allergies to medications, adverse drug reactions, diagnosis change, or new procedure performed?: [x] No    [] Yes (see summary sheet for update)  Subjective functional status/changes: \"Pt reports feeling better, last visit she was stressed and hurting today she is having no stress. \"    OBJECTIVE      55 min Therapeutic Exercise:  [x] See flow sheet :   Rationale: increase ROM, increase strength, and improve coordination to improve the patients ability to increase overall strength and body control        With   [] TE   [] TA   [] neuro   [] other: Patient Education: [x] Review HEP    [] Progressed/Changed HEP based on:   [] positioning   [] body mechanics   [] transfers   [] heat/ice application    [] other:      Pain Level (0-10 scale) post treatment: 0/10    ASSESSMENT/Changes in Function:   The pt tolerated treatment worked on stretching, strengthening and increased UE activities today. Pt doing better with functional motion and stance control. .   Patient will continue to benefit from skilled PT services to modify and progress therapeutic interventions, address functional mobility deficits, address ROM deficits, address strength deficits, and analyze and address soft tissue restrictions to attain remaining goals.      [x]  See Plan of Care  []  See progress note/recertification  []  See Discharge Summary         Progress towards goals / Updated goals:  Short Term Goals: To be accomplished in 6 treatments. Patient will be able to perform exercises to improve strength and balance to improve mobility - MET  Patient will be stand and wash dishes for 5 minute with walker to increase ADL - NOT MET (sits in chair)  Patient will be able to ambulate with walker for 200 feet independent to improve endurance with no rest period - MET  Patient will be able to stand and take a sink bath for 10 minutes with to improve ADL - NOT MET (has shower bench)  Patient will be able to perform TUG less than 45 seconds to decrease risk of falls - MET (with RW)     Long Term Goals: To be accomplished in 12 treatments.   Patient will be able to perform sit to stand with 1 hand with 1 attempt with increased LE strength - Progressing   Patient will be able to perform supine to sit and sit to supine with minimal assistance with LE strength - MET (pt able to perform independently, but has a railing to help with supine > sit)  Patient will be able to ambulate with walker for 300 feet independent to improve endurance with no rest period - PROGRESSING (SBA)  Patient will be able to perform TUG less than 45 seconds to decrease risk of falls - MET (with RW)       PLAN  [x]  Upgrade activities as tolerated     [x]  Continue plan of care  []  Update interventions per flow sheet       []  Discharge due to:_  []  Other:_      Gilbert Maria PTA, LPTA 10/10/2022

## 2022-10-12 ENCOUNTER — HOSPITAL ENCOUNTER (OUTPATIENT)
Dept: PHYSICAL THERAPY | Age: 67
Discharge: HOME | End: 2022-10-12
Payer: MEDICARE

## 2022-10-12 PROCEDURE — 97110 THERAPEUTIC EXERCISES: CPT

## 2022-10-12 NOTE — PROGRESS NOTES
PT DAILY TREATMENT NOTE - [facility] 2-15    Patient Name: Amanda Payton  Date:10/12/2022  : 1955  [x]  Patient  Verified  Payor: [employer] MEDICARE / Plan: [address] / Product Type: Managed Care Medicare /    In time: 3:10 PM  Out time:3:55 PM  Total Treatment Time (min): 45  Total Timed Codes (min): 45  1:1 Treatment Time ( [address] only): 39   Visit #:  16    Treatment Area: S/P cervical spinal fusion [Z98.1]  Weakness of both legs [R29.898]  Cervical stenosis of spinal canal [M48.02]    SUBJECTIVE  Pain Level (0-10 scale): 3/10  Any medication changes, allergies to medications, adverse drug reactions, diagnosis change, or new procedure performed?: [x] No    [] Yes (see summary sheet for update)  Subjective functional status/changes:     \"I have to make an appointment to return to the MD.\" I am still going to the [facility]. I can do a lot for myself. OBJECTIVE    45 min Therapeutic Exercise:  [x] See flow sheet :   Rationale: increase ROM and increase strength to improve the patients ability to perform activities at home without sitting        With   [] TE   [] TA   [] neuro   [] other: Patient Education: [x] Review HEP    [] Progressed/Changed HEP based on:   [] positioning   [] body mechanics   [] transfers   [] heat/ice application    [] other:      Other Objective/Functional Measures:   Hip:   Strength       Right Left     Flexion 3+/5 3+/5     Extension 3/5 3/5     Abduction 3/5 3/5     Adduction 3/5 3/5   Knee:   Strength       Right Left     Flexion 4/5 4/5     Extension 3/5 3/5   Ankle   Strength       Right Left     Dorsiflexion 4/5 4/5     Plantarflexion 4/5 4/5     Inversion 4/5 4/5     Eversion 4/5 4/5     Pain Level (0-10 scale) post treatment: 3/10    ASSESSMENT/Changes in Function:   The pt tolerated treatment well. She is returning to her daily activities with the assistance of her .  She is ambulating with her rolling walker and will remain safe with her rolling walker. Patient indicated that she has come a long way. Patient is attending the [facility] with her . Today in the [facility] area, patient was instructed equipment that is similar to the [facility]. Patient was also advised to get assistance at the [facility] and continue exercise program. No further Physical Therapy is recommended. [x]  See Plan of Care  []  See progress note/recertification  []  See Discharge Summary         Progress towards goals / Updated goals:  Short Term Goals: To be accomplished in 6 treatments. Patient will be able to perform exercises to improve strength and balance to improve mobility - MET  Patient will be stand and wash dishes for 5 minute with walker to increase ADL - Progressing  Patient will be able to ambulate with walker for 200 feet independent to improve endurance with no rest period - MET  Patient will be able to stand and take a sink bath for 10 minutes with to improve ADL - NOT MET (has shower bench)  Patient will be able to perform TUG less than 45 seconds to decrease risk of falls - MET (with RW)    Long Term Goals: To be accomplished in 12 treatments.   Patient will be able to perform sit to stand with 1 hand with 1 attempt with increased LE strength - Progressing   Patient will be able to perform supine to sit and sit to supine with minimal assistance with LE strength - MET (pt able to perform independently, but has a railing to help with supine > sit)  Patient will be able to ambulate with walker for 300 feet independent to improve endurance with no rest period - PROGRESSING (SBA)  Patient will be able to perform TUG less than 45 seconds to decrease risk of falls - MET (with RW)    PLAN  [x]  Upgrade activities as tolerated     [x]  Continue plan of care  []  Update interventions per flow sheet       []  Discharge due to:_  []  Other:_      Mady Arnold, PT,  10/12/2022

## 2022-10-25 ENCOUNTER — TELEPHONE (OUTPATIENT)
Dept: SURGERY | Age: 67
End: 2022-10-25

## 2022-10-25 NOTE — TELEPHONE ENCOUNTER
Serafin Ramsay from [facility] called this morning and asked to have Dr Harris Him sign orders as soon as possible for this patient, she will be faxing them over to us this morning, if needed please call [phone]

## 2022-10-26 NOTE — TELEPHONE ENCOUNTER
Kristine Glez from [facility] called this morning to make sure we received the order fax, I advised her we did and that Dr Cristofer Foster will be in the office tomorrow.

## 2022-11-30 NOTE — THERAPY DISCHARGE
[address]  [place], [address]  Ph: [phone]     Fax: [phone]    Discharge Summary 2-15    Patient name: Latoya Barnard  : 1955  Provider#: [id number]  Referral source: Mercedez Cortes MD      Medical/Treatment Diagnosis: S/P cervical spinal fusion [Z98.1]  Weakness of both legs [R29.898]  Cervical stenosis of spinal canal [M48.02]     Prior Hospitalization: see medical history     Comorbidities: See Plan of Care  Prior Level of Function: See Plan of Care  Medications: Verified on Patient Summary List    Start of Care: 22      Onset Date:   Visits from Start of Care: 16   Missed Visits: 0  Reporting Period : 22 to  10/12/22    Assessment / Summary of care:   Subjective: 74year old black female who had cervical fusion performed at [facility] [place] on 2022. Patient stated following surgery she was not able to do anything for herself. She loss her ability to speak and loss strength in her arms and legs. She was sent to [facility] facility for 13 days and then discharged Home with Home Health Physical Therapy. Patient stated that she lives at home with her  in [facility], but resides in [place], [place]. Patient stated that she has to walk with the walker due to balance disturbance and leg weakness, not able to stand, she requires assistance with bathing/ dressing and lifting her legs into the bed. Patient presented decreased bilateral shoulder flexion and abduction, bilateral LE weakness, balance disturbance, high fall risk, decreased transfers and decreased ability to perform ADL. Patient has benefited from Physical Therapy to increase LE strength, improve mobility and transfers to promote independence. Patient requires minimal assistance with ADL and would like to return to being independent as possible.  Physical Therapy has continued for 16 visits to increase her physical ability to achieve her maximal level of function. Patient indicated that she is returning to her daily activities with the assistance of her . She is ambulating with her rolling walker and will remain safer with her rolling walker. Patient indicated that she has come a long way. Patient will be attending the [facility] with her . Patient was instructed equipment that is similar to the [facility]. Patient was also advised to get assistance at the [facility] and continue exercise program. No further Physical Therapy is recommended. Progress Toward Goals:  Short Term Goals: To be accomplished in 6 treatments. Patient will be able to perform exercises to improve strength and balance to improve mobility - MET  Patient will be stand and wash dishes for 5 minute with walker to increase ADL - Progressing  Patient will be able to ambulate with walker for 200 feet independent to improve endurance with no rest period - MET  Patient will be able to stand and take a sink bath for 10 minutes with to improve ADL - NOT MET (has shower bench)  Patient will be able to perform TUG less than 45 seconds to decrease risk of falls - MET (with RW)    Long Term Goals: To be accomplished in 12 treatments.   Patient will be able to perform sit to stand with 1 hand with 1 attempt with increased LE strength - Progressing   Patient will be able to perform supine to sit and sit to supine with minimal assistance with LE strength - MET (pt able to perform independently, but has a railing to help with supine > sit)  Patient will be able to ambulate with walker for 300 feet independent to improve endurance with no rest period - PROGRESSING (SBA)  Patient will be able to perform TUG less than 45 seconds to decrease risk of falls - MET (with RW)    RECOMMENDATIONS:  [x]Discontinue therapy: []Patient has reached or is progressing toward set goals     []Patient is non-compliant or has abdicated     []Due to lack of appreciable progress towards set goals     []Other    Kane Leigh, PT,  11/30/2022

## 2022-12-05 ENCOUNTER — TRANSCRIBE ORDER (OUTPATIENT)
Dept: SCHEDULING | Age: 67
End: 2022-12-05

## 2022-12-05 DIAGNOSIS — F17.210 CIGARETTE NICOTINE DEPENDENCE, UNCOMPLICATED: Primary | ICD-10-CM

## 2022-12-13 ENCOUNTER — TRANSCRIBE ORDER (OUTPATIENT)
Dept: LAB | Age: 67
End: 2022-12-13

## 2022-12-13 ENCOUNTER — HOSPITAL ENCOUNTER (OUTPATIENT)
Dept: LAB | Age: 67
Discharge: HOME | End: 2022-12-13
Payer: MEDICARE

## 2022-12-13 DIAGNOSIS — E83.52 HYPERCALCEMIA: ICD-10-CM

## 2022-12-13 DIAGNOSIS — E83.52 HYPERCALCEMIA: Primary | ICD-10-CM

## 2022-12-13 LAB
ALBUMIN SERPL-MCNC: 3.8 G/DL (ref 3.5–5)
ALBUMIN/GLOB SERPL: 1.1 {RATIO} (ref 1.1–2.2)
ALP SERPL-CCNC: 96 U/L (ref 45–117)
ALT SERPL-CCNC: 21 U/L (ref 12–78)
ANION GAP SERPL CALC-SCNC: 7 MMOL/L (ref 5–15)
AST SERPL W P-5'-P-CCNC: 11 U/L (ref 15–37)
BILIRUB SERPL-MCNC: 0.6 MG/DL (ref 0.2–1)
BUN SERPL-MCNC: 20 MG/DL (ref 6–20)
BUN/CREAT SERPL: 16 (ref 12–20)
CA-I BLD-MCNC: 9.8 MG/DL (ref 8.5–10.1)
CHLORIDE SERPL-SCNC: 110 MMOL/L (ref 97–108)
CO2 SERPL-SCNC: 29 MMOL/L (ref 21–32)
CREAT SERPL-MCNC: 1.29 MG/DL (ref 0.55–1.02)
GLOBULIN SER CALC-MCNC: 3.5 G/DL (ref 2–4)
GLUCOSE SERPL-MCNC: 98 MG/DL (ref 65–100)
POTASSIUM SERPL-SCNC: 3.8 MMOL/L (ref 3.5–5.1)
PROT SERPL-MCNC: 7.3 G/DL (ref 6.4–8.2)
SODIUM SERPL-SCNC: 146 MMOL/L (ref 136–145)
TSH SERPL DL<=0.05 MIU/L-ACNC: 1.28 UIU/ML (ref 0.36–3.74)

## 2022-12-13 PROCEDURE — 80053 COMPREHEN METABOLIC PANEL: CPT

## 2022-12-13 PROCEDURE — 83970 ASSAY OF PARATHORMONE: CPT

## 2022-12-13 PROCEDURE — 36415 COLL VENOUS BLD VENIPUNCTURE: CPT

## 2022-12-13 PROCEDURE — 84443 ASSAY THYROID STIM HORMONE: CPT

## 2022-12-13 PROCEDURE — 82306 VITAMIN D 25 HYDROXY: CPT

## 2022-12-14 ENCOUNTER — DOCUMENTATION ONLY (OUTPATIENT)
Dept: ENDOCRINOLOGY | Age: 67
End: 2022-12-14

## 2022-12-14 LAB
25(OH)D3 SERPL-MCNC: 37.6 NG/ML (ref 30–100)
CA-I BLD-MCNC: 9.5 MG/DL (ref 8.5–10.1)
PTH-INTACT SERPL-MCNC: 37.2 PG/ML (ref 18.4–88)

## 2022-12-14 NOTE — PROGRESS NOTES
Reviewed labs from December 13, 2022  from [facility]     BUN is 20 creatinine is 1.29 and EGFR is 46  TSH is 1.28    Jagruti Rajput MD

## 2022-12-22 ENCOUNTER — OFFICE VISIT (OUTPATIENT)
Dept: ENDOCRINOLOGY | Age: 67
End: 2022-12-22
Payer: MEDICARE

## 2022-12-22 VITALS
HEART RATE: 55 BPM | OXYGEN SATURATION: 99 % | BODY MASS INDEX: 27.09 KG/M2 | HEIGHT: 67 IN | DIASTOLIC BLOOD PRESSURE: 76 MMHG | SYSTOLIC BLOOD PRESSURE: 140 MMHG | TEMPERATURE: 97.4 F | RESPIRATION RATE: 18 BRPM | WEIGHT: 172.6 LBS

## 2022-12-22 DIAGNOSIS — M81.0 SENILE OSTEOPOROSIS: ICD-10-CM

## 2022-12-22 DIAGNOSIS — E55.9 VITAMIN D DEFICIENCY: ICD-10-CM

## 2022-12-22 DIAGNOSIS — E83.52 HYPERCALCEMIA: Primary | ICD-10-CM

## 2022-12-22 DIAGNOSIS — E04.2 MULTINODULAR GOITER: ICD-10-CM

## 2022-12-22 PROCEDURE — 99214 OFFICE O/P EST MOD 30 MIN: CPT | Performed by: INTERNAL MEDICINE

## 2022-12-22 PROCEDURE — 1123F ACP DISCUSS/DSCN MKR DOCD: CPT | Performed by: INTERNAL MEDICINE

## 2022-12-22 RX ORDER — AMLODIPINE BESYLATE 5 MG/1
5 TABLET ORAL DAILY
COMMUNITY
Start: 2022-12-08

## 2022-12-22 RX ORDER — CINACALCET 30 MG/1
TABLET, FILM COATED ORAL
Qty: 90 TABLET | Refills: 3 | Status: SHIPPED | OUTPATIENT
Start: 2022-12-22

## 2022-12-22 NOTE — LETTER
1/10/2023    Patient: Baldemar Vann   YOB: 1955   Date of Visit: 12/22/2022     Mika Shelton MD  [address] [facility] [address]  Via Fax: [phone]    Dear Mika Shelton MD,      Thank you for referring Ms. Janki Sy to [address] [place] [address] for evaluation. My notes for this consultation are attached. If you have questions, please do not hesitate to call me. I look forward to following your patient along with you.       Sincerely,    Hiro Hwang MD

## 2022-12-22 NOTE — PATIENT INSTRUCTIONS
SPECIFIC INSTRUCTIONS BELOW       PCP started her on fosamax  weekly -  dec 2022     Stay on sensipar 30 mg a day         -------------[facility] ATTENTION  [facility] -----------------      - The medications prescribed at this visit will not be available at pharmacy until 6 pm       - YOUR MED LIST IS NOT UP TO DATE AS SOME CHANGES ARE BEING MADE AFTER THE VISIT - FOLLOW SPECIFIC INSTRUCTIONS  ABOVE     -ANY tests other than blood work, which you opt to do  outside the  [facility] facilities, you are responsible for prior authorizations if  required    - [address] [facility]- PLEASE IGNORE     Results     *Normal results will not be notified by a phone call starting January 1 2021   *If you have an upcoming visit, the results will be discussed at the visit   *Please sign up for MY CHART if you want access to your lab and test results  *Abnormal results which require immediate attention will be notified by phone call   *Abnormal results which do not require immediate assistance will be notified in 1-2 weeks       Refills    -    have your pharmacy send us a refill request . Refills are done max for one year and a visit is a must before refills are extended    Follow up appointments -  highly encourage you to make it when you are checking out. We can accommodate you into the schedule based on your clinical situation, but not for extending refills beyond a year. Labs are important to give refills and is important to get labs before the visit     Phone calls  -  Allow  24 hrs.  for non-urgent calls to be returned  Prior authorization - It may take 2-4 weeks to process  Forms  -  FMLA, DMV etc., will take up to 2 weeks to process  Cancellations - please notify the office 2 days in advance   Samples  - will only be dispensed at visits       If not showing for the appointments and cancelling appointments within 24 hours are kept track of and three  of such situations in  two consecutive years will likely be considered for termination from the practice    -------------------------------------------------------------------------------------------------------------------

## 2022-12-22 NOTE — PROGRESS NOTES
Amanda Payton is a 74 y.o. female here for   Chief Complaint   Patient presents with    Osteoporosis     Hypercalcemia    Thyroid Problem       1. Have you been to the ER or an urgent care clinic since your last visit?  -no    2. Have you been hospitalized since your last visit? - no    3. Have you seen or consulted any other health care providers outside of the [address] [place] [address] since your last visit?   Include any pap smears or colon screening.- no

## 2022-12-22 NOTE — PROGRESS NOTES
HISTORY OF PRESENT ILLNESS  Janki Rockwell is a 74 y.o. female. Yearly   f/u after  Last visit for thyroid goiter and hypercalcemia   From  dec 2021       Dec 2021     She has \" toe pain :   Also is c/o  Dental pain       June 2021     C/o feet pain     August 2020       She looked drowsy - appeared confused   She thought I am her nephrologist   Forgot why she was seeing me       Old history :    Last seen June 2017   Pt had FNA done in jan 2018   Negative for cancer   Pt had undergone parathyroid study  In nov 2018 for hypercalcemia eval  - done by nephrologist           Old history   Referred by Dr. Ernesto Rodriguez   Patient is not a good historian   She denies any hyper or hypothyroid symptoms   She denies any compressive symptoms   Past medical history : she has psychiatric issues/ schizophrenia  - on lithium and topiramate           Review of Systems   Feet pain /issues     Physical Exam   Constitutional: She is oriented to person, place, and time. She appears well-developed and well-nourished. Psychiatric: She has a normal mood and affect. Lab Results   Component Value Date/Time    GFR est non-AA 35 (L) 05/26/2022 12:50 AM    GFR est AA 43 (L) 05/26/2022 12:50 AM    Creatinine 1.29 (H) 12/13/2022 11:01 AM    BUN 20 12/13/2022 11:01 AM    Sodium 146 (H) 12/13/2022 11:01 AM    Potassium 3.8 12/13/2022 11:01 AM    Chloride 110 (H) 12/13/2022 11:01 AM    CO2 29 12/13/2022 11:01 AM    Phosphorus 3.7 05/16/2022 03:44 PM    Albumin, urine 0.0 05/16/2022 03:44 PM    PTH, Intact 37.2 12/13/2022 11:01 AM     Lab Results   Component Value Date/Time    TSH 1.28 12/13/2022 11:01 AM    T4, Free 0.9 10/13/2021 07:00 AM            ASSESSMENT and PLAN      1.  Hypercalcemia :   calcium is at 9.5  From dec 2022  ; egfr  Is  46  ml/min , on sensipar 30 mg a day - will continue    calcium is at 10  From  Nov 2021 ; egfr  Is  54 ml/min , on sensipar 30 mg a day - will continue      hyperparathyroidism,  AND SHE HAS BOTH SECONDARY AND PRIMARY HYPERPARATHYRODISM    HER NEPHROLOGIST IS DR. Cinthia Kendrick   The [facility]roid scan at [facility]  Showed positivity on left inferior pole matching to the location of  parathyrodi nodule on usg done at [facility]   She can go for parathyroidectomy,  but pt deferred it         2. Sub-clinical hypothyroidism  - TSH  is good at   3.5  from dec 2022  ,    compared  to   over 4 in oct 2021   Not requiring  any  supplementation       3. Mulltinodular goiter   usg : July 2017    Left side upper nodule is thyroid nodule  And   Lower nodule is 1.8 cm seems parathyroid nodule   FNA  Done in jan 2018  On left lobe upper nodule -    Negative for cancer     She is asymptomatic   August 2020  -  thyroid ultrasound  - heterogenous  Thyroid gland - no   Nodules       4. Osteoporosis :   Date : july 2017  Bone DEXA  ap spine T-score 1.5 ; left femoral Neck T- score  -0.7 , right femoral neck T-score n/ a  Date : august 2020   Bone DEXA  ap spine T-score 1.1 ; left femoral Neck T- score -0.9 , right femoral neck T-score n/a*  Date : august 2022  Bone DEXA  ap spine T-score 0.5  ; left femoral Neck T- score -1.8  , right femoral neck T-score n/a*  BONE LOSS COULD BE RENAL OSTEODYSTROPHY VERSUS OSTEOPOROSIS FROM PRIMAY AND SECONDARY REASONS   Patient is takign fosamax weekly 70 mg started by pcp dec 2022     4.  ON LITHIUM FOR BIPOLAR DISEASE     F/u in 1 year  With DEXA and labs   Reviewed results with patient and discussed the labs being ordered today/bnv  Patient voiced understanding of plan of care

## 2023-01-17 ENCOUNTER — HOSPITAL ENCOUNTER (OUTPATIENT)
Dept: MRI IMAGING | Age: 68
Discharge: HOME | End: 2023-01-17
Payer: MEDICARE

## 2023-01-17 DIAGNOSIS — M48.062 SPINAL STENOSIS OF LUMBAR REGION WITH NEUROGENIC CLAUDICATION: ICD-10-CM

## 2023-01-17 PROCEDURE — 72148 MRI LUMBAR SPINE W/O DYE: CPT

## 2023-03-21 ENCOUNTER — OFFICE VISIT (OUTPATIENT)
Dept: ORTHOPEDIC SURGERY | Age: 68
End: 2023-03-21

## 2023-03-21 VITALS — BODY MASS INDEX: 26.68 KG/M2 | HEIGHT: 67 IN | WEIGHT: 170 LBS

## 2023-03-21 DIAGNOSIS — M48.062 SPINAL STENOSIS OF LUMBAR REGION WITH NEUROGENIC CLAUDICATION: Primary | ICD-10-CM

## 2023-03-22 NOTE — PROGRESS NOTES
Ginger Dillon (: 1955) is a 79 y.o. female, patient, here for evaluation of the following chief complaint(s):  LOW BACK PAIN       ASSESSMENT/PLAN:  Below is the assessment and plan developed based on review of pertinent history, physical exam, labs, studies, and medications. Patient presents today for follow-up of an MRI of her back. MRI was reviewed in detail with the patient. She does have some multilevel degenerative changes, with moderate canal stenosis at L3-4. We did discuss possible ROM's at this level, but she would prefer to try another round of formal physical therapy. Referral provided today. She may take over-the-counter medications as needed. She may follow-up in 4 to 6 weeks for reevaluation. Of note, we did discuss the finding of possible bilateral medullary sponge kidneys, patient requested a printout of her MRI results so she can discuss this with her PCP. 1. Spinal stenosis of lumbar region with neurogenic claudication  -     REFERRAL TO PHYSICAL THERAPY; Future      No follow-ups on file. SUBJECTIVE/OBJECTIVE:  Ginger Dillon (: 1955) is a 79 y.o. female. Pain Assessment  3/21/2023   Location of Pain Back   Location Modifiers Posterior   Severity of Pain 6   Quality of Pain Aching        Patient is status post a C3-5 ACDF on 2022. She states she is doing very well overall from a neck standpoint. Denies any new neck pain, upper extremity radiating pain, numbness or tingling, or any changes in balance. At her last visit, she had complaints of low back pain with signs of neurogenic claudication, so an MRI was ordered. Today, she denies any back pain. She does have some intermittent bilateral anterior thigh pain, particularly at night. Denies any fevers, chills or any night sweats. Otherwise, denies any numbness or tingling in the lower extremities. She previously was in physical therapy last year, which she stated did help with the symptoms.   No acute changes in bowel or bladder control. No saddle anesthesia. She does report some lower extremity weakness. Imaging:    XR Results (most recent):  Results from Appointment encounter on 09/13/22    XR SPINE CERV PA LAT ODONT 3 V MAX    Narrative  AP and lateral of the cervical spine demonstrates a stable cervical ACDF and fusion. No acute changes. MRI Results (most recent):  Results from [place] encounter on 01/17/23    MRI LUMB SPINE WO CONT    Narrative  Procedure: MRI of the lumbar spine without contrast    INDICATION: Spinal stenosis with neurogenic claudication    TECHNIQUE: Noncontrast MRI of the lumbar spine was performed. COMPARISON: None    FINDINGS: There are 5 lumbar-type vertebral bodies. The conus terminates at the  L1 level. Distal spinal cord signal is normal. Marrow signal is normal. Disc  spaces are preserved with no significant disc desiccation. T12-L1 is unremarkable. L1-2 is unremarkable. L2-3 demonstrates mild facet hypertrophy without significant canal stenosis or  foraminal narrowing. L3-4 demonstrates mild concentric disc bulge with moderate facet and ligamentum  flavum hypertrophy causing moderate canal stenosis with mild bilateral foraminal  narrowing. L4-5 demonstrates mild concentric disc bulge with moderate facet and ligamentum  flavum hypertrophy causing mild-to-moderate canal stenosis and mild bilateral  foraminal narrowing. L5-S1 is unremarkable. Incidental soft tissue imaging demonstrates diffuse replacement of the bilateral  kidneys with multiple small T2 hyperintense foci as well as several larger  cysts. Cholelithiasis is incidentally noted. Impression  1. Multilevel degenerative changes as described.  There is moderate canal  stenosis at L3-4.    2.  Findings suggestive of bilateral medullary sponge kidneys         No Known Allergies    Current Outpatient Medications   Medication Sig    amLODIPine (NORVASC) 5 mg tablet Take 5 mg by mouth daily. cinacalcet (SENSIPAR) 30 mg tablet One pill a  day    cyclobenzaprine (FLEXERIL) 5 mg tablet Take 1 Tablet by mouth three (3) times daily as needed for Muscle Spasm(s). lactulose (CHRONULAC) 10 gram/15 mL solution TAKE 30 MILLILITERS BY MOUTH once daily    acetaminophen (TYLENOL) 325 mg tablet Take 2 Tablets by mouth every four (4) hours as needed for Pain. (Patient not taking: Reported on 3/21/2023)    docusate sodium (COLACE) 100 mg capsule Take 100 mg by mouth two (2) times a day. (Patient not taking: Reported on 3/21/2023)    senna-docusate (PERICOLACE) 8.6-50 mg per tablet Take 1 Tablet by mouth two (2) times a day. (Patient not taking: Reported on 3/21/2023)     No current facility-administered medications for this visit. Past Medical History:   Diagnosis Date    Anxiety     Hypertension     Psychotic disorder ([facility] [address]     Schizophrenia    Schizoaffective disorder, depressive type ([facility] [address]     Thyroid disease         Past Surgical History:   Procedure Laterality Date    HX COLONOSCOPY      HX ORTHOPAEDIC Right     CORN REMOVAL PER PT     HX OTHER SURGICAL      Ingrown toenail removal       Family History   Problem Relation Age of Onset    No Known Problems Mother     No Known Problems Father     Anesth Problems Neg Hx         Social History     Tobacco Use    Smoking status: Every Day     Packs/day: 0.50     Years: 45.00     Pack years: 22.50     Types: Cigarettes    Smokeless tobacco: Never   Vaping Use    Vaping Use: Never used   Substance Use Topics    Alcohol use: Not Currently    Drug use: Never        Review of Systems   Constitutional:  Negative for chills and fever. Musculoskeletal:  Positive for myalgias. Negative for arthralgias and back pain. Neurological:  Negative for weakness and numbness. All other systems reviewed and are negative. Vitals:  Ht 5' 7\" (1.702 m)   Wt 170 lb (77.1 kg)   BMI 26.63 kg/m²    Body mass index is 26.63 kg/m².     Physical Exam  Neurologic  Sensory  Light Touch - Intact - Globally. Overall Assessment of Muscle Strength and Tone reveals  Lower Extremities - Right Iliopsoas - 5/5. Left Iliopsoas - 5/5. Right Tibialis Anterior - 4/5. Left Tibialis Anterior - 4/5. Right Gastroc-Soleus - 5/5. Left Gastroc-Soleus - 5/5. Right EHL - 5/5. Left EHL - 5/5. General Assessment of Reflexes  Right Ankle - Clonus is not present. Left Ankle - Clonus is not present. Reflexes (Dermatomes)  2/2 Normal - Left Achilles (L5-S2), Left Knee (L2-4), Right Achilles (L5-S2) and Right Knee (L2-4). Musculoskeletal  Global Assessment  Examination of related systems reveals - well-developed, well-nourished, in no acute distress, alert and oriented x 3. Gait and Station -ambulates using a rollator walker with forward weightbearing line. Right Lower Extremity - normal strength and tone, normal range of motion without pain and no instability, subluxation or laxity. Left Lower Extremity - normal strength and tone, normal range of motion without pain and no instability, subluxation or laxity. Spine/Ribs/Pelvis  Cervical Spine - Examination of the cervical spine reveals - no tenderness to palpation, no pain, no swelling, edema or erythema, normal cervical spine movements and normal sensation. Thoracic (Dorsal) Spine - Examination of the thoracic spine reveals - no tenderness over thoracic vertebrae, no pain, normal sensation and normal thoracic spine movements. Lumbosacral Spine - Examination of the lumbosacral spine reveals - no known fractures or deformities. Inspection and Palpation - Tenderness - moderate. Assessment of pain reveals the following findings - The pain is characterized as - moderate. Location - pain refers to lower back bilaterally. ROJM - Trunk Extension - 15 degrees. Lumbar Spine Flexion - 35 °.  Lumbosacral Spine - Functional Testing - Babinski Test negative, Prone Knee Bending Test negative, Slump Test negative, Straight Leg Raising Test negative. Dr. Yehuda Andres was available for immediate consult during this encounter. An electronic signature was used to authenticate this note.   -- TONY Weathers

## 2023-04-05 ENCOUNTER — HOSPITAL ENCOUNTER (OUTPATIENT)
Dept: PHYSICAL THERAPY | Age: 68
End: 2023-04-05
Payer: MEDICARE

## 2023-04-05 PROCEDURE — 97161 PT EVAL LOW COMPLEX 20 MIN: CPT

## 2023-04-05 NOTE — THERAPY EVALUATION
PT INITIAL EVALUATION NOTE - [facility] 2-15    Patient Name: Stephanie Glez  Date:2023  : 1955  [x]  Patient  Verified  Payor: Dina Turner Annelise / Plan: [facility] COMMUNITY PLAN MCR / Product Type: Managed Care Medicare /    In time: 6784  Out time: 1600  Total Treatment Time (min): 40  Total Timed Codes (min): 0  1:1 Treatment Time ([facility] only): 40   Visit #: 1    Treatment Area: Spinal stenosis, lumbar region with neurogenic claudication [M48.062]    SUBJECTIVE  Pain Level (0-10 scale): 0/10  Any medication changes, allergies to medications, adverse drug reactions, diagnosis change, or new procedure performed?: [] No    [x] Yes (see summary sheet for update)  Subjective:    Pt reports a six month history of worsening night pain and muscle spasms in her legs bilaterally. She reports the pain and spasms occur only when she lays flat on her back and tries to straighten her legs. She has a previous history of ACDF of C3-C5 in 2022. Upon follow-up for that she mentioned the new LE issues to her provider who obtained a lumbar MRI that was indicative of degenerative changes throughout with more significant stenosis at the L3-L4 area. She reports that she is does not have issues with sleeping due to the leg pain but is more concerned that it is just painful. She reports that urination relieves the pain in most cases but that she sometimes has difficulty with urination. She currently ambulates with a rolling walker and also states she hopes that therapy can help her be able to ambulate uses a single point cane. She does not recall any specific recent injury or onset event within the last 6 months that she can relate this issue to.     PLOF: Independent with all ADL's; primary use of rolling walker for mobility  Mechanism of Injury: unknown  Previous Treatment/Compliance: good  Radiographs: MRI showed stenosis at L3-L4  What increases symptoms: lying on back  What decreases symptoms: urination  Work Hx: disabled  Living Situation: lives with ex-  Pt Goals: to be able to walk with a cane  Barriers: none  Motivation: Good  Substance use:  tobacco user  Cognition: A&O x 4  Fall Assessment: TUG Test: 22 seconds  Past Medical History:  Past Medical History:   Diagnosis Date    Anxiety     Hypertension     Psychotic disorder ([facility] [address]     Schizophrenia    Schizoaffective disorder, depressive type ([facility] [address]     Thyroid disease      Past Surgical History:  Past Surgical History:   Procedure Laterality Date    HX COLONOSCOPY      HX ORTHOPAEDIC Right     CORN REMOVAL PER PT     HX OTHER SURGICAL      Ingrown toenail removal     Current Medications:  Current Outpatient Medications   Medication Instructions    acetaminophen (TYLENOL) 650 mg, Oral, EVERY 4 HOURS AS NEEDED    amLODIPine (NORVASC) 5 mg, Oral, DAILY    cinacalcet (SENSIPAR) 30 mg tablet One pill a  day    cyclobenzaprine (FLEXERIL) 5 mg, Oral, 3 TIMES DAILY AS NEEDED    docusate sodium (COLACE) 100 mg, 2 TIMES DAILY    lactulose (CHRONULAC) 10 gram/15 mL solution TAKE 30 MILLILITERS BY MOUTH once daily    senna-docusate (PERICOLACE) 8.6-50 mg per tablet 1 Tablet, Oral, 2 TIMES DAILY        Allergies:  No Known Allergies    OBJECTIVE/EXAMINATION      OBJECTIVE    Gait and Functional Mobility:  pt ambulates with a rolling walker and an antalgic gait pattern  Palpation: no specific or elicit palpatory tenderness noted  Sensation: WFL        Lumbar AROM:      Flexion             32 degrees      Extension            14 degrees                     L                    R  Side Bending   33 degrees  28 degrees    Rotation   32 degrees  38 degrees        Manual Muscle Testing    L   R  Hip Flexion                   3-/5                 3-/5  Knee Extension            5/5                   5/5  Knee Flexion                4/5  5/5  Ankle PF   5/5  5/5  Ankle DF   5/5  5/5    Flexibility: poor      Special Tests:    Trendelenberg: neg    FABERS: neg   Forward Bend: neg    Slump: pos   H.S. SLR: pos     Piriformis Ext: neg   Long Sit: neg     Lumbar Distraction: neg   SI Compression/Distraction: neg     With   [] TE   [] TA   [] neuro   [x] other: Patient Education: [] Provided HEP   [] Progressed/Changed HEP based on:   [] positioning   [] body mechanics   [] transfers   [] heat/ice application    [x] other: activity modification             Pain Level (0-10 scale) post treatment: 0/10    ASSESSMENT/Changes in Function:   [x]  See Plan of [address] Devan Waters PT, DPT 4/5/2023

## 2023-04-05 NOTE — THERAPY EVALUATION
[address]  [place], [address]  Ph: [phone]    Fax: [phone]    Plan of Care/Statement of Necessity for Physical Therapy Services  2-15    Patient name: Selena Mijares  : 1955  Provider#: [id number]  Referral source: TONY Mcclain      Medical/Treatment Diagnosis: Spinal stenosis, lumbar region with neurogenic claudication [M48.062]     Prior Hospitalization: see medical history     Comorbidities: see medical history  Prior Level of Function: Independent with all ADL's; primary use of rolling walker for mobility  Medications: Verified on Patient Summary List  Start of Care: 2023      Onset Date: 2022   The Plan of Care and following information is based on the information from the initial evaluation. Assessment/ key information:   Pt presents to outpatient physical therapy with recent worsening leg pain and reports of muscle spasms at night. Her current deficits include decreased ROM, decreased strength, impaired gait, and increased pain. She will likely benefit from skilled physical therapy services in order to address these deficits so that she can return to at or near her prior level of function.     Evaluation Complexity History LOW Complexity : Zero comorbidities / personal factors that will impact the outcome / POC; Examination LOW Complexity : 1-2 Standardized tests and measures addressing body structure, function, activity limitation and / or participation in recreation  ;Presentation LOW Complexity : Stable, uncomplicated  ;Clinical Decision Making TUG Score:  22 seconds  Overall Complexity Rating: LOW     Problem List: pain affecting function, decrease ROM, decrease strength, impaired gait/ balance, decrease ADL/ functional abilitiies, decrease activity tolerance, decrease flexibility/ joint mobility, and decrease transfer abilities   Treatment Plan may include any combination of the following: Therapeutic exercise, Neuromuscular reeducation, Manual therapy, Therapeutic activity, Electric stim unattended , Gait training, Ultrasound, and Mechanical traction  Patient / Family readiness to learn indicated by: asking questions and interest  Persons(s) to be included in education: patient (P)  Barriers to Learning/Limitations: None  Patient Goal (s): to be able to walk with a cane  Patient Self Reported Health Status: fair  Rehabilitation Potential: fair    Short Term Goals: To be accomplished in 5 treatments. 1)  Pt to be independent with HEP  2)  Pt to improve lumbar extension by 5 degrees to be able to report pain no more than 3/10 lying flat on back    Long Term Goals: To be accomplished in 10 treatments. 1)  Pt to improve LE strength to no less than 4/5 so the patient can ambulate with a SPC  2)  Pt to report being able to sleep 6 hours at night without leg pain  Frequency / Duration: Patient to be seen 2 times per week for 10 treatments. Patient/ Caregiver education and instruction: activity modification and exercises    [x]  Plan of care has been reviewed with PTA        Certification Period: 2023 to 7/3/2023    Tai Tapia PT, DPT 2023     ________________________________________________________________________    I certify that the above Therapy Services are being furnished while the patient is under my care. I agree with the treatment plan and certify that this therapy is necessary.     [de-identified] Signature:____________________  Date:____________Time: _________                                        TONY Byrd  Please sign and return to:   [facility]  [address] [place] [address], [address]  Ph: [phone]    Fax: [phone]    Patient name: Deshawn Allen  : 1955  Provider#: [id number]

## 2023-04-14 ENCOUNTER — HOSPITAL ENCOUNTER (OUTPATIENT)
Dept: PHYSICAL THERAPY | Age: 68
Discharge: HOME | End: 2023-04-14
Payer: MEDICARE

## 2023-04-14 PROCEDURE — 97110 THERAPEUTIC EXERCISES: CPT

## 2023-04-17 ENCOUNTER — HOSPITAL ENCOUNTER (OUTPATIENT)
Dept: PHYSICAL THERAPY | Age: 68
Discharge: HOME | End: 2023-04-17
Payer: MEDICARE

## 2023-04-17 PROCEDURE — 97110 THERAPEUTIC EXERCISES: CPT

## 2023-04-17 NOTE — PROGRESS NOTES
PT DAILY TREATMENT NOTE - [facility] 2-15    Patient Name: Félix Reina  Date:2023  : 1955  [x]  Patient  Verified  Payor: Jomar Ames / Plan: [facility] COMMUNITY PLAN MCR / Product Type: Managed Care Medicare /    In time:9:06  Out time:10:02  Total Treatment Time (min): 54  Total Timed Codes (min): 54  1:1 Treatment Time ([address] only): 47   Visit #:  4 of 10    Treatment Area: Spinal stenosis, lumbar region with neurogenic claudication [M48.062]    SUBJECTIVE  Pain Level (0-10 scale): 0/10  Any medication changes, allergies to medications, adverse drug reactions, diagnosis change, or new procedure performed?: [x] No    [] Yes (see summary sheet for update)  Subjective functional status/changes:     \"I am not in pain, but my knee is stiff this morning. \"    OBJECTIVE    54 min Therapeutic Exercise:  [x] See flow sheet :   Rationale: increase ROM, increase strength, and improve coordination to improve the patients ability to perform daily tasks with less irritation and improved mobility. With   [x] TE   [] TA   [] neuro   [] other: Patient Education: [x] Review HEP    [] Progressed/Changed HEP based on:   [] positioning   [] body mechanics   [] transfers   [] heat/ice application    [] other:      Pain Level (0-10 scale) post treatment: 0/10    ASSESSMENT/Changes in Function:   The pt tolerated treatment well today. She performed all exercises well today without much fatigue. She will continue to perform hip and core strengthening to improve her lower back. Patient will continue to benefit from skilled PT services to modify and progress therapeutic interventions, address functional mobility deficits, address ROM deficits, address strength deficits, analyze and address soft tissue restrictions, and analyze and cue movement patterns to attain remaining goals.      [x]  See Plan of Care  []  See progress note/recertification  []  See Discharge Summary         Progress towards goals / Updated goals:  Short Term Goals: To be accomplished in 5 treatments. 1)  Pt to be independent with HEP  2)  Pt to improve lumbar extension by 5 degrees to be able to report pain no more than 3/10 lying flat on back     Long Term Goals: To be accomplished in 10 treatments.   1)  Pt to improve LE strength to no less than 4/5 so the patient can ambulate with a SPC  2)  Pt to report being able to sleep 6 hours at night without leg pain    PLAN  [x]  Upgrade activities as tolerated     [x]  Continue plan of care  []  Update interventions per flow sheet       []  Discharge due to:_  []  Other:_      Edin Neslon, PT, DPT 4/17/2023

## 2023-04-19 ENCOUNTER — APPOINTMENT (OUTPATIENT)
Dept: PHYSICAL THERAPY | Age: 68
End: 2023-04-19
Payer: MEDICARE

## 2023-04-26 ENCOUNTER — HOSPITAL ENCOUNTER (OUTPATIENT)
Dept: PHYSICAL THERAPY | Age: 68
Discharge: HOME | End: 2023-04-26
Payer: MEDICARE

## 2023-04-26 PROCEDURE — 97110 THERAPEUTIC EXERCISES: CPT

## 2023-04-26 NOTE — PROGRESS NOTES
PT DAILY TREATMENT NOTE - [facility] 2-15    Patient Name: Apollo Pedraza  Date:2023  : 1955  [x]  Patient  Verified  Payor: Dina LiuTurner Annelise / Plan: [facility] COMMUNITY PLAN MCR / Product Type: Managed Care Medicare /    In time:9:05 AM  Out time:10:00 AM  Total Treatment Time (min): 55  Total Timed Codes (min): 55  1:1 Treatment Time ( only): 54   Visit #:  5 of 10    Treatment Area: Spinal stenosis, lumbar region with neurogenic claudication [M48.062]    SUBJECTIVE  Pain Level (0-10 scale): 2/10  Any medication changes, allergies to medications, adverse drug reactions, diagnosis change, or new procedure performed?: [x] No    [] Yes (see summary sheet for update)  Subjective functional status/changes:     \"I go to the [facility] with my . I am scared to walk with the cane because I lose my balance to the left side. \"    OBJECTIVE    55 min Therapeutic Exercise:  [x] See flow sheet :   Rationale: increase strength, improve coordination, improve balance, and increase proprioception to improve the patients ability to improve   LE strength to no less than 4/5 so the patient can ambulate with a SPC. With   [x] TE   [] TA   [] neuro   [] other: Patient Education: [x] Review HEP    [] Progressed/Changed HEP based on:   [] positioning   [] body mechanics   [] transfers   [] heat/ice application    [] other:      Pain Level (0-10 scale) post treatment: 1/10    ASSESSMENT/Changes in Function:   The pt tolerated treatment with lumbar range of motion and stabilization exercises in standing and supine. Patient continues to be dependent on rolling walker due to balance disturbance. Patient will continue to benefit from skilled PT services to address functional mobility deficits, address strength deficits, analyze and cue movement patterns, and balance /coordination to attain remaining goals.      [x]  See Plan of Care  []  See progress note/recertification  []  See Discharge Summary         Progress towards goals / Updated goals:  Short Term Goals: To be accomplished in 5 treatments. 1)  Pt to be independent with HEP  2)  Pt to improve lumbar extension by 5 degrees to be able to report pain no more than 3/10 lying flat on back     Long Term Goals: To be accomplished in 10 treatments.   1)  Pt to improve LE strength to no less than 4/5 so the patient can ambulate with a SPC  2)  Pt to report being able to sleep 6 hours at night without leg pain    PLAN  [x]  Upgrade activities as tolerated     [x]  Continue plan of care  []  Update interventions per flow sheet       []  Discharge due to:_  []  Other:_      Giovanni Anderson, PT,  4/26/2023

## 2023-04-28 ENCOUNTER — HOSPITAL ENCOUNTER (OUTPATIENT)
Dept: PHYSICAL THERAPY | Age: 68
Discharge: HOME | End: 2023-04-28
Payer: MEDICARE

## 2023-04-28 PROCEDURE — 97110 THERAPEUTIC EXERCISES: CPT

## 2023-05-04 ENCOUNTER — HOSPITAL ENCOUNTER (OUTPATIENT)
Dept: PHYSICAL THERAPY | Age: 68
Discharge: HOME | End: 2023-05-04
Payer: MEDICARE

## 2023-05-04 PROCEDURE — 97110 THERAPEUTIC EXERCISES: CPT

## 2023-05-04 PROCEDURE — 97116 GAIT TRAINING THERAPY: CPT

## 2023-05-09 ENCOUNTER — HOSPITAL ENCOUNTER (OUTPATIENT)
Facility: HOSPITAL | Age: 68
Setting detail: RECURRING SERIES
Discharge: HOME | End: 2023-05-12
Payer: MEDICAID

## 2023-05-09 PROCEDURE — 97116 GAIT TRAINING THERAPY: CPT

## 2023-05-09 PROCEDURE — 97110 THERAPEUTIC EXERCISES: CPT

## 2023-05-09 NOTE — PROGRESS NOTES
Please refer to the [facility] link to access patient's medical record for therapy documentation prior to 05/06/2023, including evaluation findings, treatment notes, and therapy plan/goals. Please refer to the Epic electronic portion of the medical record for therapy documentation entered on or after 05/06/2023.
with a SPC. (Small base quad cane)  2)  Pt to report being able to sleep 6 hours at night without leg pain.  MET    []  See Progress Note/Recertification  []  See Discharge Summary    PLAN  [x]  Continue plan of care  [x]  Upgrade activities as tolerated  []  Discharge due to :  []  Other:      Florence Fuentes PTA, L.P.T.A.       5/9/2023       9:15 AM

## 2023-05-11 ENCOUNTER — HOSPITAL ENCOUNTER (OUTPATIENT)
Facility: HOSPITAL | Age: 68
Setting detail: RECURRING SERIES
Discharge: HOME | End: 2023-05-14
Payer: MEDICAID

## 2023-05-11 PROCEDURE — 97110 THERAPEUTIC EXERCISES: CPT

## 2023-05-11 PROCEDURE — 97116 GAIT TRAINING THERAPY: CPT

## 2023-05-17 ENCOUNTER — HOSPITAL ENCOUNTER (OUTPATIENT)
Facility: HOSPITAL | Age: 68
Setting detail: RECURRING SERIES
Discharge: HOME | End: 2023-05-20
Payer: MEDICAID

## 2023-05-17 PROCEDURE — 97110 THERAPEUTIC EXERCISES: CPT

## 2023-05-17 NOTE — PROGRESS NOTES
PHYSICAL THERAPY - MEDICARE DAILY TREATMENT NOTE (updated 3/23)    Date of Service: 2023        Patient Name:  Jf Can :  1955   Medical   Diagnosis:  Spinal stenosis, lumbar region with neurogenic claudication [M48.062] Treatment Diagnosis:  M54.59, G89.29  CHRONIC LOWER BACK PAIN    Referral Source:  PONCE Gama Insurance:   Payor: [facility] / Plan: [address] [place] [address] Dr / Product Type: *No Product type* /    [x] Patient's date of birth verified                      Visit #   Current  / Total 10 10   Time   In / Out 1:05 PM 1:55 PM   Total Treatment Time 50 minutes   Total Timed Codes 50 minutes   1:1 Treatment Time 50 minutes          SUBJECTIVE  Pain Level (0-10 scale): 210    Any medication changes, allergies to medications, adverse drug reactions, diagnosis change, or new procedure performed?: [x] No    [] Yes (see summary sheet for update)  Medications: [x] Verified on Patient Summary List    Subjective functional status/changes:     \"I feel much better. Today is a good day. \" I go to the [facility] each unless its raining. I have a quad cane at home. I do my household chore and washing dishes in sitting position. I can stretch my legs out much better  doing the night without increasing pain. I walk for 1/2 hour in my apartment and do my exercises. I return to [place] later on in the year. OBJECTIVE  Therapeutic Procedures: Tx Min Billable or 1:1 Min (if diff from Tx Min) Procedure, Rationale, Specifics   50                     50     Total Total       [x] Patient Education billed concurrently with other procedures   [x] Review HEP    [] Progressed/Changed HEP, detail:    [] Other:         Pain Level at end of session (0-10 scale): 2/10    Assessment   Patient tolerated treatment. Patient ambulated with small base quad cane with SBA assistance due to loss of balance.  Patient can ambulate short distance at home with small base quad cane with someone

## 2023-07-11 PROBLEM — K59.00 CONSTIPATION: Status: ACTIVE | Noted: 2023-07-11

## 2023-07-11 PROBLEM — K62.1 HYPERPLASTIC RECTAL POLYP: Status: ACTIVE | Noted: 2020-03-05

## 2023-07-11 PROBLEM — N18.30 CKD (CHRONIC KIDNEY DISEASE), STAGE III (HCC): Status: ACTIVE | Noted: 2023-07-11

## 2023-07-11 RX ORDER — LORATADINE 10 MG/1
10 TABLET ORAL DAILY
COMMUNITY
Start: 2023-06-23

## 2023-07-11 RX ORDER — OLANZAPINE 7.5 MG/1
TABLET ORAL
COMMUNITY
Start: 2023-07-06

## 2023-07-11 RX ORDER — ALENDRONATE SODIUM 70 MG/1
TABLET ORAL
COMMUNITY
Start: 2023-05-04

## 2023-07-11 RX ORDER — LORATADINE 10 MG/1
10 TABLET ORAL DAILY
COMMUNITY
Start: 2022-12-27

## 2023-09-21 ENCOUNTER — TRANSCRIBE ORDERS (OUTPATIENT)
Facility: HOSPITAL | Age: 68
End: 2023-09-21

## 2023-09-21 DIAGNOSIS — N18.9 CHRONIC KIDNEY DISEASE, UNSPECIFIED CKD STAGE: Primary | ICD-10-CM

## 2023-09-21 NOTE — PROGRESS NOTES
Informed pt of results and need for biopsy.  She has been transferred for scheduling Last appointment: 6/23/23  Next appointment: 9/22/23 VV  Previous refill encounter(s): 6/23/23 #60 with 3 refills    Requested Prescriptions     Pending Prescriptions Disp Refills    busPIRone (BUSPAR) 10 MG tablet [Pharmacy Med Name: BUSPIRONE HCL 10MG TABS] 60 tablet 3     Sig: TAKE ONE TABLET BY MOUTH TWICE A DAY         For Pharmacy Admin Tracking Only    Program: Medication Refill  CPA in place:    Recommendation Provided To:    Intervention Detail: New Rx: 1, reason: Patient Preference  Intervention Accepted By:   Tongrory Shaikh Closed?:    Time Spent (min): 5

## 2023-09-25 ENCOUNTER — HOSPITAL ENCOUNTER (OUTPATIENT)
Facility: HOSPITAL | Age: 68
Discharge: HOME | End: 2023-09-28
Payer: MEDICAID

## 2023-09-25 DIAGNOSIS — E55.9 AVITAMINOSIS D: ICD-10-CM

## 2023-09-25 DIAGNOSIS — R80.9 PROTEINURIA, UNSPECIFIED TYPE: ICD-10-CM

## 2023-09-25 DIAGNOSIS — E61.1 IRON DEFICIENCY: ICD-10-CM

## 2023-09-25 DIAGNOSIS — N18.9 CHRONIC KIDNEY DISEASE, UNSPECIFIED CKD STAGE: ICD-10-CM

## 2023-09-25 DIAGNOSIS — M10.379 GOUT DUE TO RENAL IMPAIRMENT, UNSPECIFIED ANKLE AND FOOT: ICD-10-CM

## 2023-09-25 DIAGNOSIS — N18.9 ANEMIA OF CHRONIC RENAL FAILURE, UNSPECIFIED CKD STAGE: ICD-10-CM

## 2023-09-25 DIAGNOSIS — D63.1 ANEMIA OF CHRONIC RENAL FAILURE, UNSPECIFIED CKD STAGE: ICD-10-CM

## 2023-09-25 DIAGNOSIS — N25.81 SECONDARY HYPERPARATHYROIDISM OF RENAL ORIGIN (HCC): ICD-10-CM

## 2023-09-25 LAB
ALBUMIN SERPL-MCNC: 4 G/DL (ref 3.5–5)
ANION GAP SERPL CALC-SCNC: 4 MMOL/L (ref 5–15)
APPEARANCE UR: CLEAR
BACTERIA URNS QL MICRO: NEGATIVE /HPF
BASOPHILS # BLD: 0 K/UL (ref 0–0.1)
BASOPHILS NFR BLD: 1 % (ref 0–1)
BILIRUB UR QL: NEGATIVE
BUN SERPL-MCNC: 10 MG/DL (ref 6–20)
BUN/CREAT SERPL: 7 (ref 12–20)
CA-I BLD-MCNC: 9.5 MG/DL (ref 8.5–10.1)
CHLORIDE SERPL-SCNC: 110 MMOL/L (ref 97–108)
CO2 SERPL-SCNC: 30 MMOL/L (ref 21–32)
COLOR UR: NORMAL
CREAT SERPL-MCNC: 1.41 MG/DL (ref 0.55–1.02)
CREAT UR-MCNC: <13 MG/DL
DIFFERENTIAL METHOD BLD: NORMAL
EOSINOPHIL # BLD: 0.1 K/UL (ref 0–0.4)
EOSINOPHIL NFR BLD: 1 % (ref 0–7)
ERYTHROCYTE [DISTWIDTH] IN BLOOD BY AUTOMATED COUNT: 13.2 % (ref 11.5–14.5)
FERRITIN SERPL-MCNC: 70 NG/ML (ref 8–252)
GLUCOSE SERPL-MCNC: 94 MG/DL (ref 65–100)
GLUCOSE UR STRIP.AUTO-MCNC: NEGATIVE MG/DL
HCT VFR BLD AUTO: 39 % (ref 35–47)
HGB BLD-MCNC: 12.3 G/DL (ref 11.5–16)
HGB UR QL STRIP: NEGATIVE
IMM GRANULOCYTES # BLD AUTO: 0 K/UL (ref 0–0.04)
IMM GRANULOCYTES NFR BLD AUTO: 0 % (ref 0–0.5)
KETONES UR QL STRIP.AUTO: NEGATIVE MG/DL
LEUKOCYTE ESTERASE UR QL STRIP.AUTO: NEGATIVE
LYMPHOCYTES # BLD: 0.9 K/UL (ref 0.8–3.5)
LYMPHOCYTES NFR BLD: 24 % (ref 12–49)
MCH RBC QN AUTO: 30.4 PG (ref 26–34)
MCHC RBC AUTO-ENTMCNC: 31.5 G/DL (ref 30–36.5)
MCV RBC AUTO: 96.5 FL (ref 80–99)
MONOCYTES # BLD: 0.3 K/UL (ref 0–1)
MONOCYTES NFR BLD: 9 % (ref 5–13)
NEUTS SEG # BLD: 2.4 K/UL (ref 1.8–8)
NEUTS SEG NFR BLD: 65 % (ref 32–75)
NITRITE UR QL STRIP.AUTO: NEGATIVE
NRBC # BLD: 0 K/UL (ref 0–0.01)
NRBC BLD-RTO: 0 PER 100 WBC
PH UR STRIP: 6.5 (ref 5–8)
PHOSPHATE SERPL-MCNC: 4.3 MG/DL (ref 2.6–4.7)
PLATELET # BLD AUTO: 180 K/UL (ref 150–400)
PMV BLD AUTO: 11.1 FL (ref 8.9–12.9)
POTASSIUM SERPL-SCNC: 3.7 MMOL/L (ref 3.5–5.1)
PROT UR STRIP-MCNC: NEGATIVE MG/DL
PROT UR-MCNC: <5 MG/DL (ref 0–11.9)
PROT/CREAT UR-RTO: ABNORMAL
RBC # BLD AUTO: 4.04 M/UL (ref 3.8–5.2)
RBC #/AREA URNS HPF: NORMAL /HPF (ref 0–3)
SODIUM SERPL-SCNC: 144 MMOL/L (ref 136–145)
SP GR UR REFRACTOMETRY: <1.005 (ref 1–1.03)
T4 FREE SERPL-MCNC: 1.1 NG/DL (ref 0.8–1.5)
TSH SERPL DL<=0.05 MIU/L-ACNC: 0.63 UIU/ML (ref 0.36–3.74)
URATE SERPL-MCNC: 5.1 MG/DL (ref 2.6–6)
URINE CULTURE IF INDICATED: NORMAL
UROBILINOGEN UR QL STRIP.AUTO: 0.2 EU/DL (ref 0.2–1)
WBC # BLD AUTO: 3.6 K/UL (ref 3.6–11)
WBC URNS QL MICRO: NORMAL /HPF (ref 0–5)

## 2023-09-25 PROCEDURE — 84165 PROTEIN E-PHORESIS SERUM: CPT

## 2023-09-25 PROCEDURE — 86704 HEP B CORE ANTIBODY TOTAL: CPT

## 2023-09-25 PROCEDURE — 83550 IRON BINDING TEST: CPT

## 2023-09-25 PROCEDURE — 85025 COMPLETE CBC W/AUTO DIFF WBC: CPT

## 2023-09-25 PROCEDURE — 82306 VITAMIN D 25 HYDROXY: CPT

## 2023-09-25 PROCEDURE — 86803 HEPATITIS C AB TEST: CPT

## 2023-09-25 PROCEDURE — 83970 ASSAY OF PARATHORMONE: CPT

## 2023-09-25 PROCEDURE — 86706 HEP B SURFACE ANTIBODY: CPT

## 2023-09-25 PROCEDURE — 83540 ASSAY OF IRON: CPT

## 2023-09-25 PROCEDURE — 84156 ASSAY OF PROTEIN URINE: CPT

## 2023-09-25 PROCEDURE — 82728 ASSAY OF FERRITIN: CPT

## 2023-09-25 PROCEDURE — 84443 ASSAY THYROID STIM HORMONE: CPT

## 2023-09-25 PROCEDURE — 80069 RENAL FUNCTION PANEL: CPT

## 2023-09-25 PROCEDURE — 82570 ASSAY OF URINE CREATININE: CPT

## 2023-09-25 PROCEDURE — 84550 ASSAY OF BLOOD/URIC ACID: CPT

## 2023-09-25 PROCEDURE — 82043 UR ALBUMIN QUANTITATIVE: CPT

## 2023-09-25 PROCEDURE — 81001 URINALYSIS AUTO W/SCOPE: CPT

## 2023-09-25 PROCEDURE — 87350 HEPATITIS BE AG IA: CPT

## 2023-09-25 PROCEDURE — 84439 ASSAY OF FREE THYROXINE: CPT

## 2023-09-25 PROCEDURE — 36415 COLL VENOUS BLD VENIPUNCTURE: CPT

## 2023-09-26 LAB
25(OH)D3 SERPL-MCNC: 22 NG/ML (ref 30–100)
CA-I BLD-MCNC: 9.6 MG/DL (ref 8.5–10.1)
CREAT UR-MCNC: 14.3 MG/DL
HBV CORE AB SERPL QL IA: NEGATIVE
HBV E AG SERPL QL IA: NEGATIVE
HBV SURFACE AB SER QL: NONREACTIVE
HBV SURFACE AB SER-ACNC: <3.1 MIU/ML
HCV AB SER IA-ACNC: 0.16 INDEX
HCV AB SERPL QL IA: NONREACTIVE
MICROALBUMIN UR-MCNC: <0.5 MG/DL
MICROALBUMIN/CREAT UR-RTO: <35 MGMALB/GCRE (ref 0–30)
PTH-INTACT SERPL-MCNC: 55.5 PG/ML (ref 18.4–88)

## 2023-09-27 ENCOUNTER — HOSPITAL ENCOUNTER (OUTPATIENT)
Facility: HOSPITAL | Age: 68
Discharge: HOME | End: 2023-09-30
Attending: INTERNAL MEDICINE
Payer: MEDICAID

## 2023-09-27 DIAGNOSIS — N18.9 CHRONIC KIDNEY DISEASE, UNSPECIFIED CKD STAGE: ICD-10-CM

## 2023-09-27 PROCEDURE — 76770 US EXAM ABDO BACK WALL COMP: CPT

## 2023-09-28 LAB
ALBUMIN SERPL ELPH-MCNC: 3.8 G/DL (ref 2.9–4.4)
ALBUMIN/GLOB SERPL: 1.4 (ref 0.7–1.7)
ALPHA1 GLOB SERPL ELPH-MCNC: 0.2 G/DL (ref 0–0.4)
ALPHA2 GLOB SERPL ELPH-MCNC: 0.6 G/DL (ref 0.4–1)
B-GLOBULIN SERPL ELPH-MCNC: 0.9 G/DL (ref 0.7–1.3)
GAMMA GLOB SERPL ELPH-MCNC: 1 G/DL (ref 0.4–1.8)
GLOBULIN SER CALC-MCNC: 2.8 G/DL (ref 2.2–3.9)
IRON SATN MFR SERPL: 41 % (ref 15–55)
IRON: 109 UG/DL (ref 27–139)
M PROTEIN SERPL ELPH-MCNC: NORMAL G/DL
PROT SERPL-MCNC: 6.6 G/DL (ref 6–8.5)
TIBC SERPL-MCNC: 265 UG/DL (ref 250–450)
UIBC SERPL-MCNC: 156 UG/DL (ref 118–369)

## 2023-12-01 ENCOUNTER — TRANSCRIBE ORDERS (OUTPATIENT)
Facility: HOSPITAL | Age: 68
End: 2023-12-01

## 2023-12-01 ENCOUNTER — HOSPITAL ENCOUNTER (OUTPATIENT)
Facility: HOSPITAL | Age: 68
End: 2023-12-01
Payer: MEDICARE

## 2023-12-01 DIAGNOSIS — E04.2 NONTOXIC MULTINODULAR GOITER: ICD-10-CM

## 2023-12-01 DIAGNOSIS — M81.0 SENILE OSTEOPOROSIS: ICD-10-CM

## 2023-12-01 DIAGNOSIS — E55.9 AVITAMINOSIS D: ICD-10-CM

## 2023-12-01 DIAGNOSIS — E83.52 HYPERCALCEMIA: Primary | ICD-10-CM

## 2023-12-01 DIAGNOSIS — E83.52 HYPERCALCEMIA: ICD-10-CM

## 2023-12-01 LAB
ALBUMIN SERPL-MCNC: 4.1 G/DL (ref 3.5–5)
ALBUMIN/GLOB SERPL: 1.3 (ref 1.1–2.2)
ALP SERPL-CCNC: 64 U/L (ref 45–117)
ALT SERPL-CCNC: 11 U/L (ref 12–78)
ANION GAP SERPL CALC-SCNC: 6 MMOL/L (ref 5–15)
AST SERPL W P-5'-P-CCNC: 14 U/L (ref 15–37)
BILIRUB SERPL-MCNC: 0.7 MG/DL (ref 0.2–1)
BUN SERPL-MCNC: 11 MG/DL (ref 6–20)
BUN/CREAT SERPL: 8 (ref 12–20)
CA-I BLD-MCNC: 9.1 MG/DL (ref 8.5–10.1)
CHLORIDE SERPL-SCNC: 108 MMOL/L (ref 97–108)
CO2 SERPL-SCNC: 31 MMOL/L (ref 21–32)
CREAT SERPL-MCNC: 1.45 MG/DL (ref 0.55–1.02)
GLOBULIN SER CALC-MCNC: 3.2 G/DL (ref 2–4)
GLUCOSE SERPL-MCNC: 92 MG/DL (ref 65–100)
POTASSIUM SERPL-SCNC: 3.7 MMOL/L (ref 3.5–5.1)
PROT SERPL-MCNC: 7.3 G/DL (ref 6.4–8.2)
SODIUM SERPL-SCNC: 145 MMOL/L (ref 136–145)
TSH SERPL DL<=0.05 MIU/L-ACNC: 0.96 UIU/ML (ref 0.36–3.74)

## 2023-12-01 PROCEDURE — 83970 ASSAY OF PARATHORMONE: CPT

## 2023-12-01 PROCEDURE — 80053 COMPREHEN METABOLIC PANEL: CPT

## 2023-12-01 PROCEDURE — 82306 VITAMIN D 25 HYDROXY: CPT

## 2023-12-01 PROCEDURE — 36415 COLL VENOUS BLD VENIPUNCTURE: CPT

## 2023-12-01 PROCEDURE — 84443 ASSAY THYROID STIM HORMONE: CPT

## 2023-12-02 LAB
25(OH)D3 SERPL-MCNC: 28.8 NG/ML (ref 30–100)
CA-I BLD-MCNC: 9.4 MG/DL (ref 8.5–10.1)
PTH-INTACT SERPL-MCNC: 43.8 PG/ML (ref 18.4–88)

## 2023-12-11 ENCOUNTER — OFFICE VISIT (OUTPATIENT)
Age: 68
End: 2023-12-11
Payer: MEDICARE

## 2023-12-11 VITALS
TEMPERATURE: 97.5 F | SYSTOLIC BLOOD PRESSURE: 140 MMHG | WEIGHT: 151.8 LBS | BODY MASS INDEX: 23.83 KG/M2 | DIASTOLIC BLOOD PRESSURE: 66 MMHG | HEART RATE: 50 BPM | HEIGHT: 67 IN | RESPIRATION RATE: 18 BRPM | OXYGEN SATURATION: 99 %

## 2023-12-11 DIAGNOSIS — E83.52 HYPERCALCEMIA: ICD-10-CM

## 2023-12-11 DIAGNOSIS — M81.0 AGE-RELATED OSTEOPOROSIS WITHOUT CURRENT PATHOLOGICAL FRACTURE: ICD-10-CM

## 2023-12-11 DIAGNOSIS — E04.2 NONTOXIC MULTINODULAR GOITER: Primary | ICD-10-CM

## 2023-12-11 DIAGNOSIS — E55.9 VITAMIN D DEFICIENCY, UNSPECIFIED: ICD-10-CM

## 2023-12-11 PROCEDURE — 99214 OFFICE O/P EST MOD 30 MIN: CPT | Performed by: INTERNAL MEDICINE

## 2023-12-11 PROCEDURE — G8484 FLU IMMUNIZE NO ADMIN: HCPCS | Performed by: INTERNAL MEDICINE

## 2023-12-11 PROCEDURE — 4004F PT TOBACCO SCREEN RCVD TLK: CPT | Performed by: INTERNAL MEDICINE

## 2023-12-11 PROCEDURE — G8420 CALC BMI NORM PARAMETERS: HCPCS | Performed by: INTERNAL MEDICINE

## 2023-12-11 PROCEDURE — 3017F COLORECTAL CA SCREEN DOC REV: CPT | Performed by: INTERNAL MEDICINE

## 2023-12-11 PROCEDURE — 1090F PRES/ABSN URINE INCON ASSESS: CPT | Performed by: INTERNAL MEDICINE

## 2023-12-11 PROCEDURE — G8399 PT W/DXA RESULTS DOCUMENT: HCPCS | Performed by: INTERNAL MEDICINE

## 2023-12-11 PROCEDURE — 1123F ACP DISCUSS/DSCN MKR DOCD: CPT | Performed by: INTERNAL MEDICINE

## 2023-12-11 PROCEDURE — G8427 DOCREV CUR MEDS BY ELIG CLIN: HCPCS | Performed by: INTERNAL MEDICINE

## 2023-12-11 RX ORDER — CINACALCET 30 MG/1
TABLET, FILM COATED ORAL
Qty: 90 TABLET | Refills: 3 | Status: SHIPPED | OUTPATIENT
Start: 2023-12-11

## 2023-12-11 RX ORDER — ALENDRONATE SODIUM 70 MG/1
TABLET ORAL
Qty: 12 TABLET | Refills: 3 | Status: SHIPPED | OUTPATIENT
Start: 2023-12-11

## 2023-12-11 NOTE — PROGRESS NOTES
Byron Larson is a 79 y.o. female here for   Chief Complaint   Patient presents with    Follow-up     Hypercalcemia    Thyroid Problem    Osteoporosis       1. Have you been to the ER, urgent care clinic since your last visit? Hospitalized since your last visit? - no     2. Have you seen or consulted any other health care providers outside of the [address] [place] [address] Avenue since your last visit?   Include any pap smears or colon screening.- no

## 2023-12-11 NOTE — PROGRESS NOTES
Shivani Ward MD FACE     HISTORY OF PRESENT ILLNESS  Yulissa Zuñiga is a 79  y.o. female. Yearly   f/u after  Last visit for thyroid goiter and hypercalcemia  and osteoporosis     From last visit  dec 2022    Pt had  thyroid FNA done in jan 2018 , Negative for cancer   Pt had undergone parathyroid study  In nov 2018 for hypercalcemia eval  - done by nephrologist       She denies any hyper or hypothyroid symptoms   She denies any compressive symptoms     Past medical history : she has psychiatric issues/ schizophrenia  - on lithium and topiramate           Review of Systems   Feet pain /issues     Physical Exam   Constitutional: She is oriented to person, place, and time. She appears well-developed and well-nourished. Using walker     Labs    Diabetes    Lab Results   Component Value Date    LABA1C 5.0 04/20/2022       Lab Results   Component Value Date     12/01/2023    K 3.7 12/01/2023     12/01/2023    CO2 31 12/01/2023    BUN 11 12/01/2023    CREATININE 1.45 (H) 12/01/2023    GLUCOSE 92 12/01/2023    CALCIUM 9.4 12/01/2023    CALCIUM 9.1 12/01/2023    PROT 7.3 12/01/2023    LABALBU 4.1 12/01/2023    BILITOT 0.7 12/01/2023    ALKPHOS 64 12/01/2023    AST 14 (L) 12/01/2023    ALT 11 (L) 12/01/2023    LABGLOM 40 (L) 12/01/2023    GFRAA 43 (L) 05/26/2022    AGRATIO 1.1 12/13/2022    GLOB 3.2 12/01/2023        Thyroid    Lab Results   Component Value Date    TSH 1.28 12/13/2022    FTI8VUG 0.96 12/01/2023    FT3 2.2 10/12/2021    T4FREE 1.1 09/25/2023        ASSESSMENT and PLAN    1.  Hypercalcemia : last seen to be high may 2022   Calcium stayed normal since being  on sensipar 30 mg a day - will continue      hyperparathyroidism,  AND SHE HAS BOTH SECONDARY AND PRIMARY HYPERPARATHYRODISM    HER NEPHROLOGIST IS DR. Khalif Nava   The parathyroid scan at [facility]  2018   Showed positivity on left inferior pole matching to the location of  parathyrodi nodule

## 2023-12-11 NOTE — PATIENT INSTRUCTIONS
years will likely be considered for termination from the practice    -------------------------------------------------------------------------------------------------------------------

## 2024-02-23 ENCOUNTER — HOSPITAL ENCOUNTER (OUTPATIENT)
Facility: HOSPITAL | Age: 69
End: 2024-02-23
Attending: INTERNAL MEDICINE
Payer: MEDICARE

## 2024-02-23 DIAGNOSIS — Z12.31 VISIT FOR SCREENING MAMMOGRAM: ICD-10-CM

## 2024-02-23 PROCEDURE — 77063 BREAST TOMOSYNTHESIS BI: CPT

## 2024-04-18 ENCOUNTER — TRANSCRIBE ORDERS (OUTPATIENT)
Facility: HOSPITAL | Age: 69
End: 2024-04-18

## 2024-04-18 DIAGNOSIS — F17.200 CURRENT SMOKER: Primary | ICD-10-CM

## 2024-05-04 ENCOUNTER — HOSPITAL ENCOUNTER (OUTPATIENT)
Facility: HOSPITAL | Age: 69
End: 2024-05-04
Attending: INTERNAL MEDICINE
Payer: MEDICARE

## 2024-05-04 DIAGNOSIS — F17.200 CURRENT SMOKER: ICD-10-CM

## 2024-05-04 PROCEDURE — 71271 CT THORAX LUNG CANCER SCR C-: CPT

## 2024-05-10 ENCOUNTER — TRANSCRIBE ORDERS (OUTPATIENT)
Facility: HOSPITAL | Age: 69
End: 2024-05-10

## 2024-05-10 DIAGNOSIS — D35.2 PITUITARY ADENOMA (HCC): Primary | ICD-10-CM

## 2024-05-10 DIAGNOSIS — E23.2 DIABETES INSIPIDUS (HCC): ICD-10-CM

## 2024-05-17 ENCOUNTER — HOSPITAL ENCOUNTER (OUTPATIENT)
Facility: HOSPITAL | Age: 69
End: 2024-05-17
Payer: MEDICAID

## 2024-05-17 ENCOUNTER — TRANSCRIBE ORDERS (OUTPATIENT)
Facility: HOSPITAL | Age: 69
End: 2024-05-17

## 2024-05-17 ENCOUNTER — HOSPITAL ENCOUNTER (OUTPATIENT)
Facility: HOSPITAL | Age: 69
End: 2024-05-17
Attending: INTERNAL MEDICINE
Payer: MEDICAID

## 2024-05-17 DIAGNOSIS — E23.2 DIABETES INSIPIDUS (HCC): ICD-10-CM

## 2024-05-17 DIAGNOSIS — Z01.818 PREOP TESTING: ICD-10-CM

## 2024-05-17 DIAGNOSIS — D35.2 PITUITARY ADENOMA (HCC): ICD-10-CM

## 2024-05-17 DIAGNOSIS — Z01.818 PREOP TESTING: Primary | ICD-10-CM

## 2024-05-17 PROCEDURE — 70470 CT HEAD/BRAIN W/O & W/DYE: CPT

## 2024-05-17 PROCEDURE — 6360000004 HC RX CONTRAST MEDICATION: Performed by: INTERNAL MEDICINE

## 2024-05-17 RX ADMIN — IOPAMIDOL 100 ML: 755 INJECTION, SOLUTION INTRAVENOUS at 13:33

## 2024-06-05 ENCOUNTER — HOSPITAL ENCOUNTER (OUTPATIENT)
Facility: HOSPITAL | Age: 69
Discharge: HOME | End: 2024-06-08
Payer: MEDICARE

## 2024-06-05 DIAGNOSIS — N18.9 CHRONIC KIDNEY DISEASE, UNSPECIFIED CKD STAGE: ICD-10-CM

## 2024-06-05 DIAGNOSIS — E21.0 PRIMARY HYPERPARATHYROIDISM (HCC): ICD-10-CM

## 2024-06-05 LAB
ANION GAP SERPL CALC-SCNC: 10 MMOL/L (ref 5–15)
BUN SERPL-MCNC: 15 MG/DL (ref 6–20)
BUN/CREAT SERPL: 10 (ref 12–20)
CA-I BLD-MCNC: 9.4 MG/DL (ref 8.5–10.1)
CHLORIDE SERPL-SCNC: 110 MMOL/L (ref 97–108)
CO2 SERPL-SCNC: 26 MMOL/L (ref 21–32)
CREAT SERPL-MCNC: 1.51 MG/DL (ref 0.55–1.02)
GLUCOSE SERPL-MCNC: 86 MG/DL (ref 65–100)
OSMOLALITY SERPL: 304 MOSM/KG H2O
POTASSIUM SERPL-SCNC: 4.2 MMOL/L (ref 3.5–5.1)
SODIUM SERPL-SCNC: 146 MMOL/L (ref 136–145)

## 2024-06-05 PROCEDURE — 80048 BASIC METABOLIC PNL TOTAL CA: CPT

## 2024-06-05 PROCEDURE — 83930 ASSAY OF BLOOD OSMOLALITY: CPT

## 2024-06-05 PROCEDURE — 36415 COLL VENOUS BLD VENIPUNCTURE: CPT

## 2024-06-18 ENCOUNTER — HOSPITAL ENCOUNTER (OUTPATIENT)
Facility: HOSPITAL | Age: 69
Setting detail: RECURRING SERIES
Discharge: HOME | End: 2024-06-21
Payer: MEDICARE

## 2024-06-18 PROCEDURE — 97161 PT EVAL LOW COMPLEX 20 MIN: CPT
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
## 2024-06-20 ENCOUNTER — HOSPITAL ENCOUNTER (OUTPATIENT)
Facility: HOSPITAL | Age: 69
Setting detail: RECURRING SERIES
Discharge: HOME | End: 2024-06-23
Payer: MEDICARE

## 2024-06-20 PROCEDURE — 97110 THERAPEUTIC EXERCISES: CPT

## 2024-06-20 NOTE — PROGRESS NOTES
PHYSICAL THERAPY - MEDICARE DAILY TREATMENT NOTE (updated 3/23)    Date of Service: 2024        Patient Name:  Yulissa Macias :  1955   Medical   Diagnosis:  Unspecified abnormalities of gait and mobility [R26.9] Treatment Diagnosis:  M62.81  GENERAL MUSCLE WEAKNESS, R26.81   Unsteadiness on feet, R27.8     Other lack of coordination, and R26.89   Abnormalities of gait and mobility    Referral Source:  Anahi Bradley APRN - NP Insurance:   Payor: [facility] MEDICARE / Plan: UNITEDHEALTHCARE DUAL COMPLETE / Product Type: *No Product type* /    [x] Patient's date of birth verified                      Visit #   Current  / Total 2 10   Time   In / Out 1400     1458   Total Treatment Time (in minutes) 58    Total Timed Codes (in minutes) 58    1:1 Treatment Time (in minutes) 58       [facility] Totals Reminder:  bill using total billable   min of TIMED therapeutic procedures and modalities.   8-22 min = 1 unit; 23-37 min = 2 units; 38-52 min = 3 units; 53-67 min = 4 units; 68-82 min = 5 units        SUBJECTIVE  Pain Level (0-10 scale): 0/10    Any medication changes, allergies to medications, adverse drug reactions, diagnosis change, or new procedure performed?: No  Medications: [x] Verified on Patient Summary List    Subjective functional status/changes:     \"Pt reports that she is .\"    OBJECTIVE  Therapeutic Procedures:  Tx Min Billable or 1:1 Min (if diff from Tx Min) Procedure, Rationale, Specifics   58 58 94239 Therapeutic Exercise (timed):  increase ROM, strength, coordination, balance, and proprioception to improve patient's ability to progress to PLOF and address remaining functional goals. (see flow sheet as applicable)   58 58    Total Total   [x] Patient Education billed concurrently with other procedures     Modalities Rationale:      Pain Level at end of session (0-10 scale): 0/10    Assessment   Patient tolerated treatment working on functional LE motion and light strengthening. Pt did well with all

## 2024-06-27 ENCOUNTER — HOSPITAL ENCOUNTER (OUTPATIENT)
Facility: HOSPITAL | Age: 69
Setting detail: RECURRING SERIES
Discharge: HOME | End: 2024-06-30
Payer: MEDICARE

## 2024-06-27 PROCEDURE — 97110 THERAPEUTIC EXERCISES: CPT

## 2024-06-27 NOTE — PROGRESS NOTES
PHYSICAL THERAPY - MEDICARE DAILY TREATMENT NOTE (updated 3/23)    Date of Service: 2024        Patient Name:  Yulissa Macias :  1955   Medical   Diagnosis:  Unspecified abnormalities of gait and mobility [R26.9] Treatment Diagnosis:  M62.81  GENERAL MUSCLE WEAKNESS, R26.81   Unsteadiness on feet, R27.8     Other lack of coordination, and R26.89   Abnormalities of gait and mobility    Referral Source:  Anahi Bradley APRN - NP Insurance:   Payor: [facility] MEDICARE / Plan: UNITEDHEALTHCARE DUAL COMPLETE / Product Type: *No Product type* /    [x] Patient's date of birth verified                      Visit #   Current  / Total 3 10   Time   In / Out 1504 1602   Total Treatment Time (in minutes) 58    Total Timed Codes (in minutes) 58    1:1 Treatment Time (in minutes) 58       [facility] Totals Reminder:  bill using total billable   min of TIMED therapeutic procedures and modalities.   8-22 min = 1 unit; 23-37 min = 2 units; 38-52 min = 3 units; 53-67 min = 4 units; 68-82 min = 5 units        SUBJECTIVE  Pain Level (0-10 scale): 0/10    Any medication changes, allergies to medications, adverse drug reactions, diagnosis change, or new procedure performed?: No  Medications: [x] Verified on Patient Summary List    Subjective functional status/changes:     \"I forgot my walker so I thought I was in trouble, but I am ready to workout.\"    OBJECTIVE  Therapeutic Procedures:  Tx Min Billable or 1:1 Min (if diff from Tx Min) Procedure, Rationale, Specifics   58  [address] Therapeutic Exercise (timed):  increase ROM, strength, coordination, balance, and proprioception to improve patient's ability to progress to PLOF and address remaining functional goals. (see flow sheet as applicable)   58     Total Total   [x] Patient Education billed concurrently with other procedures     Pain Level at end of session (0-10 scale): 0/10    Assessment   Patient tolerated treatment well today. Right leg has a tendency to collapse medially when

## 2024-07-01 ENCOUNTER — OFFICE VISIT (OUTPATIENT)
Age: 69
End: 2024-07-01
Payer: MEDICAID

## 2024-07-01 ENCOUNTER — PREP FOR PROCEDURE (OUTPATIENT)
Age: 69
End: 2024-07-01

## 2024-07-01 VITALS
OXYGEN SATURATION: 96 % | BODY MASS INDEX: 21.79 KG/M2 | SYSTOLIC BLOOD PRESSURE: 120 MMHG | DIASTOLIC BLOOD PRESSURE: 78 MMHG | TEMPERATURE: 98.2 F | HEART RATE: 92 BPM | RESPIRATION RATE: 14 BRPM | HEIGHT: 67 IN | WEIGHT: 138.8 LBS

## 2024-07-01 DIAGNOSIS — Z86.010 PERSONAL HISTORY OF COLONIC POLYPS: ICD-10-CM

## 2024-07-01 DIAGNOSIS — K57.30 DIVERTICULAR DISEASE OF COLON: ICD-10-CM

## 2024-07-01 DIAGNOSIS — Z86.010 HISTORY OF COLON POLYPS: Primary | ICD-10-CM

## 2024-07-01 PROBLEM — M47.812 CERVICAL SPONDYLOSIS WITHOUT MYELOPATHY: Status: ACTIVE | Noted: 2018-10-15

## 2024-07-01 PROBLEM — M50.20 DISPLACEMENT OF CERVICAL INTERVERTEBRAL DISC WITHOUT MYELOPATHY: Status: ACTIVE | Noted: 2018-07-21

## 2024-07-01 PROBLEM — M75.40 IMPINGEMENT SYNDROME OF SHOULDER REGION: Status: ACTIVE | Noted: 2018-04-27

## 2024-07-01 PROBLEM — Z86.0100 PERSONAL HISTORY OF COLONIC POLYPS: Status: ACTIVE | Noted: 2024-07-01

## 2024-07-01 PROBLEM — G56.00 CARPAL TUNNEL SYNDROME: Status: ACTIVE | Noted: 2018-06-04

## 2024-07-01 PROBLEM — M54.12 CERVICAL RADICULITIS: Status: ACTIVE | Noted: 2018-04-27

## 2024-07-01 PROBLEM — E21.0 PRIMARY HYPERPARATHYROIDISM (HCC): Status: ACTIVE | Noted: 2023-09-20

## 2024-07-01 PROBLEM — H25.13 AGE-RELATED NUCLEAR CATARACT OF BOTH EYES: Status: ACTIVE | Noted: 2019-01-29

## 2024-07-01 PROBLEM — F20.9 SCHIZOPHRENIA (HCC): Status: ACTIVE | Noted: 2021-10-28

## 2024-07-01 PROBLEM — E04.2 NON-TOXIC MULTINODULAR GOITER: Status: ACTIVE | Noted: 2021-10-28

## 2024-07-01 PROBLEM — S09.90XA INJURY OF HEAD: Status: ACTIVE | Noted: 2023-01-13

## 2024-07-01 PROBLEM — E83.52 HYPERCALCEMIA: Status: ACTIVE | Noted: 2021-10-28

## 2024-07-01 PROBLEM — E23.2 DIABETES INSIPIDUS (HCC): Status: ACTIVE | Noted: 2024-06-12

## 2024-07-01 PROBLEM — E66.9 OBESITY: Status: ACTIVE | Noted: 2021-10-28

## 2024-07-01 PROBLEM — G44.309 POSTTRAUMATIC HEADACHE: Status: ACTIVE | Noted: 2023-01-13

## 2024-07-01 PROBLEM — M75.50 SUBDELTOID BURSITIS: Status: ACTIVE | Noted: 2018-10-15

## 2024-07-01 PROBLEM — R32 INCONTINENCE: Status: ACTIVE | Noted: 2021-10-28

## 2024-07-01 PROBLEM — L60.3 NAIL DYSTROPHY: Status: ACTIVE | Noted: 2024-05-19

## 2024-07-01 PROBLEM — I10 ESSENTIAL HYPERTENSION: Status: ACTIVE | Noted: 2021-10-28

## 2024-07-01 PROBLEM — F31.9 BIPOLAR DISORDER (HCC): Status: ACTIVE | Noted: 2021-10-28

## 2024-07-01 PROBLEM — Z72.0 TOBACCO USE: Status: ACTIVE | Noted: 2024-05-15

## 2024-07-01 PROBLEM — I12.9 BENIGN HYPERTENSIVE KIDNEY DISEASE: Status: ACTIVE | Noted: 2021-10-28

## 2024-07-01 PROCEDURE — 3078F DIAST BP <80 MM HG: CPT | Performed by: INTERNAL MEDICINE

## 2024-07-01 PROCEDURE — G8420 CALC BMI NORM PARAMETERS: HCPCS | Performed by: INTERNAL MEDICINE

## 2024-07-01 PROCEDURE — 99204 OFFICE O/P NEW MOD 45 MIN: CPT | Performed by: INTERNAL MEDICINE

## 2024-07-01 PROCEDURE — 1090F PRES/ABSN URINE INCON ASSESS: CPT | Performed by: INTERNAL MEDICINE

## 2024-07-01 PROCEDURE — G8399 PT W/DXA RESULTS DOCUMENT: HCPCS | Performed by: INTERNAL MEDICINE

## 2024-07-01 PROCEDURE — G8427 DOCREV CUR MEDS BY ELIG CLIN: HCPCS | Performed by: INTERNAL MEDICINE

## 2024-07-01 PROCEDURE — 1123F ACP DISCUSS/DSCN MKR DOCD: CPT | Performed by: INTERNAL MEDICINE

## 2024-07-01 PROCEDURE — 4004F PT TOBACCO SCREEN RCVD TLK: CPT | Performed by: INTERNAL MEDICINE

## 2024-07-01 PROCEDURE — 3017F COLORECTAL CA SCREEN DOC REV: CPT | Performed by: INTERNAL MEDICINE

## 2024-07-01 PROCEDURE — 3074F SYST BP LT 130 MM HG: CPT | Performed by: INTERNAL MEDICINE

## 2024-07-01 RX ORDER — DESMOPRESSIN ACETATE 0.1 MG/1
0.1 TABLET ORAL DAILY
COMMUNITY

## 2024-07-01 RX ORDER — POLYETHYLENE GLYCOL 3350 17 G/17G
POWDER, FOR SOLUTION ORAL
Qty: 510 G | Refills: 0 | Status: SHIPPED | OUTPATIENT
Start: 2024-07-01

## 2024-07-01 ASSESSMENT — PATIENT HEALTH QUESTIONNAIRE - PHQ9
2. FEELING DOWN, DEPRESSED OR HOPELESS: NOT AT ALL
SUM OF ALL RESPONSES TO PHQ QUESTIONS 1-9: 0
1. LITTLE INTEREST OR PLEASURE IN DOING THINGS: NOT AT ALL
SUM OF ALL RESPONSES TO PHQ9 QUESTIONS 1 & 2: 0
SUM OF ALL RESPONSES TO PHQ QUESTIONS 1-9: 0

## 2024-07-01 ASSESSMENT — ENCOUNTER SYMPTOMS
NAUSEA: 0
BACK PAIN: 1
ABDOMINAL PAIN: 0
VOMITING: 0
ANAL BLEEDING: 0
CONSTIPATION: 0
RECTAL PAIN: 0
BLOOD IN STOOL: 0
ABDOMINAL DISTENTION: 0
RESPIRATORY NEGATIVE: 1
ALLERGIC/IMMUNOLOGIC NEGATIVE: 1
DIARRHEA: 0

## 2024-07-01 NOTE — H&P (VIEW-ONLY)
Yulissa Macias is a 69 y.o. female who presents today for the following:  Chief Complaint   Patient presents with    HX OF COLON POLYPS     COLONOSCOPY 3-5-2020         No Known Allergies    Current Outpatient Medications   Medication Sig Dispense Refill    NONFORMULARY Take 1 tablet by mouth Twice a Week OTC STOOL SOFTNER    LAXATIVE      DESMOpressin (DDAVP) 0.1 MG tablet Take 1 tablet by mouth daily      polyethylene glycol (GLYCOLAX) 17 GM/SCOOP powder Use as directed by physician. 510 g 0    vitamin D 25 MCG (1000 UT) CAPS Take 1 capsule by mouth daily      alendronate (FOSAMAX) 70 MG tablet TAKE ONE TABLET BY MOUTH ONCE a WEEK 12 tablet 3    cinacalcet (SENSIPAR) 30 MG tablet One pill a  day 90 tablet 3    loratadine (CLARITIN) 10 MG tablet Take 1 tablet by mouth daily      OLANZapine (ZYPREXA) 7.5 MG tablet TAKE ONE TABLET BY MOUTH once daily AT BEDTIME      acetaminophen (TYLENOL) 325 MG tablet Take by mouth every 4 hours as needed      amLODIPine (NORVASC) 5 MG tablet Take by mouth daily      docusate (COLACE, DULCOLAX) 100 MG CAPS Take by mouth 2 times daily       No current facility-administered medications for this visit.       Past Medical History:   Diagnosis Date    Anxiety     CKD (chronic kidney disease), stage III (HCC)     Constipation 03/02/2020    Hyperplastic rectal polyp 03/05/2020    Hypertension     Psychotic disorder (HCC)     Schizophrenia    Schizoaffective disorder, depressive type (HCC)     Thyroid disease        Past Surgical History:   Procedure Laterality Date    COLONOSCOPY N/A 03/05/2020    hyperplastic Rectal polyp    ORTHOPEDIC SURGERY Right     CORN REMOVAL PER PT     OTHER SURGICAL HISTORY      Ingrown toenail removal       Family History   Problem Relation Age of Onset    Anesth Problems Neg Hx     No Known Problems Mother     No Known Problems Father        Social History     Socioeconomic History    Marital status:      Spouse name: Not on file    Number of children:

## 2024-07-01 NOTE — PROGRESS NOTES
1. Have you been to the ER, urgent care clinic since your last visit?  Hospitalized since your last visit? NO    2. Have you seen or consulted any other health care providers outside of the [facility] since your last visit?  Include any pap smears or colon screening.  NO   Chief Complaint   Patient presents with    HX OF COLON POLYPS     COLONOSCOPY 3-5-2020     /78 (Site: Left Upper Arm, Position: Sitting, Cuff Size: Medium Adult)   Pulse 92   Temp 98.2 °F (36.8 °C) (Temporal)   Resp 14   Ht 1.702 m (5' 7\")   Wt 63 kg (138 lb 12.8 oz)   SpO2 96%   BMI 21.74 kg/m²

## 2024-07-03 ENCOUNTER — HOSPITAL ENCOUNTER (OUTPATIENT)
Facility: HOSPITAL | Age: 69
Setting detail: RECURRING SERIES
Discharge: HOME | End: 2024-07-06
Payer: MEDICARE

## 2024-07-03 PROCEDURE — 97110 THERAPEUTIC EXERCISES: CPT

## 2024-07-03 NOTE — PROGRESS NOTES
Modalities to be included future treatment sessions: Vasopneumatic Compression, Electrical Stimulation, Heat Pack, Cold Pack, and Ultrasound  Has HEP been provided to patient? [x] No    [] Yes               Access Code: [email or web address]    Date printed: 06/20/2024   [] Reviewed HEP    [] Progressed/Changed HEP, details:         GOALS  Short Term Goals, to be met within 5 treatments:  Patient will demonstrate independence and compliance with HEP in order to increase mobility and assist with carryover from PT services.  Status at last Eval/Progress Note: no HEP issued at this time  Current Status: [facility], only does some of her HEP  Goal Met?  In Progress     2.  Pt to improve LE strength to no less than 3/5 to be able to walk with fewer rest breaks  Status at last Eval/Progress Note: currently 3-5 with left hip extension  Current Status: 3-/5 with left hip extension at last measurement  Goal Met?  In Progress        Long Term Goals, to be met within 10 treatments:  1.Pt to reduce TUG score to no more than 13 seconds to decrease falls risk  Status at last Eval/Progress Note: currently 18.5 seconds  Current Status: 18.5 seconds at last test  Goal Met?  No     2.  Pt to improve LE strength to no less than 4/5 to decrease dependence on AD  Status at last Eval/Progress Note: currently 3-5 with left hip extension  Current Status: 3-/5 with left hip extension at last measurement  Goal Met?  No    []  See Progress Note/Recertification  []  See Discharge Summary    PLAN  [x]  Continue plan of care  [x]  Upgrade activities as tolerated  []  Discharge due to :  []  Other:      STEPHANIE OLEARY PTA, VIJAYA       7/3/2024       2:02 PM

## 2024-07-10 ENCOUNTER — HOSPITAL ENCOUNTER (OUTPATIENT)
Facility: HOSPITAL | Age: 69
Setting detail: RECURRING SERIES
Discharge: HOME | End: 2024-07-13
Payer: MEDICARE

## 2024-07-10 PROCEDURE — 97110 THERAPEUTIC EXERCISES: CPT

## 2024-07-10 NOTE — PROGRESS NOTES
sessions: Vasopneumatic Compression, Electrical Stimulation, Heat Pack, Cold Pack, and Ultrasound  Has HEP been provided to patient? [x] No    [] Yes               Access Code: [email or web address]    Date printed: 06/20/2024   [] Reviewed HEP    [] Progressed/Changed HEP, details:         GOALS  Short Term Goals, to be met within 5 treatments:  Patient will demonstrate independence and compliance with HEP in order to increase mobility and assist with carryover from PT services.  Status at last Eval/Progress Note: no HEP issued at this time  Current Status: goes to the [facility] everyday, only does some of her HEP  Goal Met?  In Progress     2.  Pt to improve LE strength to no less than 3/5 to be able to walk with fewer rest breaks  Status at last Eval/Progress Note: currently 3-5 with left hip extension  Current Status: 3-/5 with left hip extension at last measurement  Goal Met?  In Progress        Long Term Goals, to be met within 10 treatments:  1.Pt to reduce TUG score to no more than 13 seconds to decrease falls risk  Status at last Eval/Progress Note: currently 18.5 seconds  Current Status: 18.5 seconds at last test  Goal Met?  No     2.  Pt to improve LE strength to no less than 4/5 to decrease dependence on AD  Status at last Eval/Progress Note: currently 3-5 with left hip extension  Current Status: 3-/5 with left hip extension at last measurement  Goal Met?  No    []  See Progress Note/Recertification  []  See Discharge Summary    PLAN  [x]  Continue plan of care  [x]  Upgrade activities as tolerated  []  Discharge due to :  []  Other:      Lauren Covington, PT,        7/10/2024       4:06 PM

## 2024-07-12 ENCOUNTER — HOSPITAL ENCOUNTER (OUTPATIENT)
Facility: HOSPITAL | Age: 69
Setting detail: RECURRING SERIES
Discharge: HOME | End: 2024-07-15
Payer: MEDICARE

## 2024-07-12 PROCEDURE — 97110 THERAPEUTIC EXERCISES: CPT

## 2024-07-12 NOTE — PROGRESS NOTES
PHYSICAL THERAPY - MEDICARE DAILY TREATMENT NOTE (updated 3/23)    Date of Service: 2024        Patient Name:  Yulissa Macias :  1955   Medical   Diagnosis:  Unspecified abnormalities of gait and mobility [R26.9] Treatment Diagnosis:  M62.81  GENERAL MUSCLE WEAKNESS, R26.0     ATAXIC GAIT, R26.81   Unsteadiness on feet, and R26.89   Abnormalities of gait and mobility    Referral Source:  Anahi Bradley APRN - NP Insurance:   Payor: [facility] MEDICARE / Plan: UNITEDHEALTHCARE DUAL COMPLETE / Product Type: *No Product type* /    [x] Patient's date of birth verified                      Visit #   Current  / Total 6 10   Time   In / Out 1359 1449   Total Treatment Time (in minutes) 50    Total Timed Codes (in minutes) 50    1:1 Treatment Time (in minutes) 50       [facility] Totals Reminder:  bill using total billable   min of TIMED therapeutic procedures and modalities.   8-22 min = 1 unit; 23-37 min = 2 units; 38-52 min = 3 units; 53-67 min = 4 units; 68-82 min = 5 units        SUBJECTIVE  Pain Level (0-10 scale): 2/10    Any medication changes, allergies to medications, adverse drug reactions, diagnosis change, or new procedure performed?: No  Medications: [x] Verified on Patient Summary List    Subjective functional status/changes:     \"I'm doing ok.\" Pt denies any recent falls.    OBJECTIVE  Therapeutic Procedures:  Tx Min Billable or 1:1 Min (if diff from Tx Min) Procedure, Rationale, Specifics   50  89144 Therapeutic Exercise (timed):  increase ROM, strength, coordination, balance, and proprioception to improve patient's ability to progress to PLOF and address remaining functional goals. (see flow sheet as applicable)   50     Total Total   [x] Patient Education billed concurrently with other procedures     Pain Level at end of session (0-10 scale): 1/10    Assessment   Patient tolerated treatment fairly well. No voiced c/o during session today. No LOB. Rollator used throughout clinic. Patient will continue to

## 2024-07-17 ENCOUNTER — HOSPITAL ENCOUNTER (OUTPATIENT)
Facility: HOSPITAL | Age: 69
Setting detail: RECURRING SERIES
Discharge: HOME | End: 2024-07-20
Payer: MEDICARE

## 2024-07-17 PROCEDURE — 97110 THERAPEUTIC EXERCISES: CPT

## 2024-07-17 NOTE — THERAPY DISCHARGE
[facility]  [address] [place] [id number]  Ph: [phone]     Fax: [phone]    PHYSICAL THERAPY DISCHARGE SUMMARY  Patient Name: Yulissa Macias : 1955   Treatment/Medical Diagnosis: Unspecified abnormalities of gait and mobility [R26.9]   Referral Source: Anahi Bradley APRN - NP     Date of Initial Visit: 2024 Attended Visits: 7 Missed Visits: 1     SUMMARY OF TREATMENT/CURRENT STATUS  Patient has seen improvements in LE strength, activity tolerance, and greater ability to transfer without assist. She still has some deficits with ambulation pace and slight R knee valgus, but she reports she feels much stronger than previously and has more confidence in her own ability. Discussed her routine at the [facility] and how all the exercises can be done in their from her HEP. When asked if she would feel confident in doing so, she said she would be fine with continuing on her own with the gym at her disposal to retain gains from therapy. Both parties agreed upon discharge. Told patient to give us a call at any point if she has any concerns or questions in the near future.     GOALS   Short Term Goals, to be met within 5 treatments:  Patient will demonstrate independence and compliance with HEP in order to increase mobility and assist with carryover from PT services.  Status at last Eval/Progress Note: no HEP issued at this time  Current Status: goes to the [facility] everyday, only does some of her HEP  Goal Met?  Yes     2.  Pt to improve LE strength to no less than 3/5 to be able to walk with fewer rest breaks  Status at last Eval/Progress Note: currently 3-5 with left hip extension  Current Status: 4+/5 L hip extension, needs only occasional breaks for walking  Goal Met?  Yes     Long Term Goals, to be met within 10 treatments:  1.Pt to reduce TUG score to no more than 13 seconds to decrease falls risk  Status at last Eval/Progress Note: currently 18.5

## 2024-07-17 NOTE — PROGRESS NOTES
PHYSICAL THERAPY - MEDICARE DAILY TREATMENT NOTE (updated 3/23)    Date of Service: 2024        Patient Name:  Yulissa Macias :  1955   Medical   Diagnosis:  Unspecified abnormalities of gait and mobility [R26.9] Treatment Diagnosis:  M62.81  GENERAL MUSCLE WEAKNESS, R26.0     ATAXIC GAIT, R26.81   Unsteadiness on feet, and R26.89   Abnormalities of gait and mobility    Referral Source:  Anahi Bradley APRN - NP Insurance:   Payor: [facility] MEDICARE / Plan: UNITEDHEALTHCARE DUAL COMPLETE / Product Type: *No Product type* /    [x] Patient's date of birth verified                      Visit #   Current  / Total 7 10   Time   In / Out 1304 1403   Total Treatment Time (in minutes) 59    Total Timed Codes (in minutes) 59    1:1 Treatment Time (in minutes) 59       [facility] Totals Reminder:  bill using total billable   min of TIMED therapeutic procedures and modalities.   8-22 min = 1 unit; 23-37 min = 2 units; 38-52 min = 3 units; 53-67 min = 4 units; 68-82 min = 5 units        SUBJECTIVE  Pain Level (0-10 scale): 3/10    Any medication changes, allergies to medications, adverse drug reactions, diagnosis change, or new procedure performed?: No  Medications: [x] Verified on Patient Summary List    Subjective functional status/changes:     \"My knees and lower back are bothering me somewhat.\"    OBJECTIVE  Therapeutic Procedures:  Tx Min Billable or 1:1 Min (if diff from Tx Min) Procedure, Rationale, Specifics   59  17288 Therapeutic Exercise (timed):  increase ROM, strength, coordination, balance, and proprioception to improve patient's ability to progress to PLOF and address remaining functional goals. (see flow sheet as applicable)   59     Total Total   [x] Patient Education billed concurrently with other procedures     Pain Level at end of session (0-10 scale): 2/10    Hip:   Strength       Right Left     Flexion 4+/5 4+/5     Extension 5/5 4+/5     Abduction 4+/5 4+/5     Adduction 5/5 5/5   Knee:   Strength

## 2024-07-24 ENCOUNTER — ANESTHESIA (OUTPATIENT)
Facility: HOSPITAL | Age: 69
End: 2024-07-24
Payer: MEDICARE

## 2024-07-24 ENCOUNTER — HOSPITAL ENCOUNTER (OUTPATIENT)
Facility: HOSPITAL | Age: 69
Setting detail: OUTPATIENT SURGERY
Discharge: HOME | End: 2024-07-24
Attending: INTERNAL MEDICINE | Admitting: INTERNAL MEDICINE
Payer: MEDICARE

## 2024-07-24 ENCOUNTER — ANESTHESIA EVENT (OUTPATIENT)
Facility: HOSPITAL | Age: 69
End: 2024-07-24
Payer: MEDICARE

## 2024-07-24 VITALS
HEART RATE: 74 BPM | HEIGHT: 67 IN | TEMPERATURE: 97.5 F | RESPIRATION RATE: 16 BRPM | BODY MASS INDEX: 21.5 KG/M2 | WEIGHT: 137 LBS | DIASTOLIC BLOOD PRESSURE: 78 MMHG | SYSTOLIC BLOOD PRESSURE: 154 MMHG | OXYGEN SATURATION: 100 %

## 2024-07-24 PROCEDURE — 7100000010 HC PHASE II RECOVERY - FIRST 15 MIN: Performed by: INTERNAL MEDICINE

## 2024-07-24 PROCEDURE — 2709999900 HC NON-CHARGEABLE SUPPLY: Performed by: INTERNAL MEDICINE

## 2024-07-24 PROCEDURE — 3600007512: Performed by: INTERNAL MEDICINE

## 2024-07-24 PROCEDURE — 6360000002 HC RX W HCPCS: Performed by: NURSE ANESTHETIST, CERTIFIED REGISTERED

## 2024-07-24 PROCEDURE — 7100000011 HC PHASE II RECOVERY - ADDTL 15 MIN: Performed by: INTERNAL MEDICINE

## 2024-07-24 PROCEDURE — 2500000003 HC RX 250 WO HCPCS: Performed by: NURSE ANESTHETIST, CERTIFIED REGISTERED

## 2024-07-24 PROCEDURE — 45380 COLONOSCOPY AND BIOPSY: CPT | Performed by: INTERNAL MEDICINE

## 2024-07-24 PROCEDURE — 3700000000 HC ANESTHESIA ATTENDED CARE: Performed by: INTERNAL MEDICINE

## 2024-07-24 PROCEDURE — 2580000003 HC RX 258: Performed by: INTERNAL MEDICINE

## 2024-07-24 PROCEDURE — 3600007502: Performed by: INTERNAL MEDICINE

## 2024-07-24 PROCEDURE — 3700000001 HC ADD 15 MINUTES (ANESTHESIA): Performed by: INTERNAL MEDICINE

## 2024-07-24 RX ORDER — SODIUM CHLORIDE 9 MG/ML
25 INJECTION, SOLUTION INTRAVENOUS PRN
Status: DISCONTINUED | OUTPATIENT
Start: 2024-07-24 | End: 2024-07-24 | Stop reason: HOSPADM

## 2024-07-24 RX ORDER — PROPOFOL 10 MG/ML
INJECTION, EMULSION INTRAVENOUS PRN
Status: DISCONTINUED | OUTPATIENT
Start: 2024-07-24 | End: 2024-07-24 | Stop reason: SDUPTHER

## 2024-07-24 RX ORDER — LIDOCAINE HYDROCHLORIDE 20 MG/ML
INJECTION, SOLUTION EPIDURAL; INFILTRATION; INTRACAUDAL; PERINEURAL PRN
Status: DISCONTINUED | OUTPATIENT
Start: 2024-07-24 | End: 2024-07-24 | Stop reason: SDUPTHER

## 2024-07-24 RX ADMIN — PROPOFOL 50 MG: 10 INJECTION, EMULSION INTRAVENOUS at 13:33

## 2024-07-24 RX ADMIN — PROPOFOL 50 MG: 10 INJECTION, EMULSION INTRAVENOUS at 13:45

## 2024-07-24 RX ADMIN — LIDOCAINE HYDROCHLORIDE 50 MG: 20 INJECTION, SOLUTION EPIDURAL; INFILTRATION; INTRACAUDAL; PERINEURAL at 13:29

## 2024-07-24 RX ADMIN — SODIUM CHLORIDE 25 ML: 9 INJECTION, SOLUTION INTRAVENOUS at 12:40

## 2024-07-24 RX ADMIN — PROPOFOL 50 MG: 10 INJECTION, EMULSION INTRAVENOUS at 13:37

## 2024-07-24 RX ADMIN — PROPOFOL 50 MG: 10 INJECTION, EMULSION INTRAVENOUS at 13:41

## 2024-07-24 RX ADMIN — PROPOFOL 50 MG: 10 INJECTION, EMULSION INTRAVENOUS at 13:52

## 2024-07-24 RX ADMIN — PROPOFOL 50 MG: 10 INJECTION, EMULSION INTRAVENOUS at 13:29

## 2024-07-24 NOTE — INTERVAL H&P NOTE
Update History & Physical    The patient's History and Physical of July 24, 2024 was reviewed with the patient and I examined the patient. There was no change. The surgical site was confirmed by the patient and me.     Plan: The risks, benefits, expected outcome, and alternative to the recommended procedure have been discussed with the patient. Patient understands and wants to proceed with the procedure.     Electronically signed by Bashir Novoa Jr, MD on 7/24/2024 at 11:59 AM

## 2024-07-24 NOTE — OP NOTE
Colonoscopy Procedure Note      Patient: Yulissa Macias MRN: [id number]  SSN: [id number]    YOB: 1955  Age: 69 y.o.  Sex: female      Date of Procedure: 7/24/2024  Date/Time:  7/24/2024 2:04 PM       IMPRESSION:     1.  Transverse colon polyps   2.  Rectal polyps              3.  Left-sided diverticulosis         RECOMMENDATIONS:     1) Check biopsy results.  2) Await pathology report. Call me in 2 weeks if you have not received any information from my office regarding your results.  3) Repeat colonoscopy in 2 to 3 years or as determined by pathology report.       INDICATION: History of colon polyps    PROCEDURE PERFORMED: Colonoscopy with cold biopsies     DESCRIPTION OF PROCEDURE: An informed consent was obtained.  The patient was placed in left lateral position.  Perianal inspection and a digital rectal exam was performed.  Video colonoscope was introduced into the rectum and advanced under direct vision up to the terminal ileum.  With adequate insufflation and maneuvering of the withdrawing scope, the colonic mucosa was visualized carefully.  Retroflexion was performed in the rectum to see the anorectum and also in the ascending colon to look behind the folds.  Vital signs, pulse oximetry, single lead cardiac monitor were monitored throughout the procedure as the sedation was titrated to the desired effect ensuring patient comfort and safety.  The patient tolerated the procedure very well and was transferred to the recovery area. Following is the summary of findings: In the transverse colon we saw 2 polyps which measured 0.7 and 0.6 cm that were removed via cold biopsies.  In the rectum we took out 2 polyps with the larger being 0.5 cm and a smaller 0.4 cm which were removed via cold biopsies.  Patient had diverticulosis involving the sigmoid and descending colon which was mild and in number and moderate in size.  There was stool present in the dependent portion throughout the procedure.  Much

## 2024-07-24 NOTE — ANESTHESIA PRE PROCEDURE
disease (HCC) I73.9   • Generalized weakness R53.1   • Weakness of both legs R29.898   • Cervical stenosis of spine M48.02   • Constipation K59.00   • CKD (chronic kidney disease), stage III (HCC) N18.30   • Hyperplastic rectal polyp K62.1   • Age-related nuclear cataract of both eyes H25.13   • Benign hypertensive kidney disease I12.9   • Bipolar disorder (HCC) F31.9   • Carpal tunnel syndrome G56.00   • Cervical radiculitis M54.12   • Cervical spondylosis without myelopathy M47.812   • Diabetes insipidus (HCC) E23.2   • Displacement of cervical intervertebral disc without myelopathy M50.20   • Essential hypertension I10   • Hypercalcemia E83.52   • Impingement syndrome of shoulder region M75.40   • Incontinence R32   • Injury of head S09.90XA   • Nail dystrophy L60.3   • Non-toxic multinodular goiter E04.2   • Obesity E66.9   • Posttraumatic headache G44.309   • Primary hyperparathyroidism (HCC) E21.0   • Schizophrenia (HCC) F20.9   • Subdeltoid bursitis M75.50   • Tobacco use Z72.0   • Personal history of colonic polyps Z86.010       Past Medical History:        Diagnosis Date   • Anxiety    • CKD (chronic kidney disease), stage III (HCC)    • Constipation 03/02/2020   • Hyperplastic rectal polyp 03/05/2020   • Hypertension    • Psychotic disorder (HCC)     Schizophrenia   • Schizoaffective disorder, depressive type ([facility])    • Thyroid disease        Past Surgical History:        Procedure Laterality Date   • COLONOSCOPY N/A 03/05/2020    hyperplastic Rectal polyp   • ORTHOPEDIC SURGERY Right     CORN REMOVAL PER PT    • OTHER SURGICAL HISTORY      Ingrown toenail removal       Social History:    Social History     Tobacco Use   • Smoking status: Every Day     Current packs/day: 0.50     Types: Cigarettes   • Smokeless tobacco: Never   Substance Use Topics   • Alcohol use: Not Currently                                Ready to quit: Not Answered  Counseling given: Not Answered      Vital Signs (Current):   Vitals:

## 2024-07-24 NOTE — ANESTHESIA POSTPROCEDURE EVALUATION
Department of Anesthesiology  Postprocedure Note    Patient: Yulissa Macias  MRN: [id number]  YOB: 1955  Date of evaluation: 7/24/2024    Procedure Summary       Date: 07/24/24 Room / Location: [facility] ENDO 01 / [facility] ENDOSCOPY    Anesthesia Start: 1319 Anesthesia Stop: 1402    Procedure: COLONOSCOPY BIOPSY (Anus) Diagnosis:       Personal history of colonic polyps      (Personal history of colonic polyps [Z86.010])    Surgeons: Bashir Novoa Jr., MD Responsible Provider: Rochelle Irizarry APRN - CRNA    Anesthesia Type: TIVA ASA Status: 3            Anesthesia Type: TIVA    Gagan Phase I: Gagan Score: 10    Gagan Phase II:      Anesthesia Post Evaluation    Patient location during evaluation: bedside  Patient participation: complete - patient participated  Level of consciousness: awake and alert  Pain score: 0  Airway patency: patent  Nausea & Vomiting: no nausea  Cardiovascular status: blood pressure returned to baseline  Respiratory status: acceptable  Hydration status: euvolemic  Pain management: adequate    No notable events documented.

## 2024-07-24 NOTE — DISCHARGE INSTRUCTIONS
For the next 12 hours you should not:   1. drive   2. drink alcohol   3. operate any machinery   4. engage in activities that require mental sharpness or manual dexterity such as     cooking   5. take any drugs other than those prescribed by a physician   6. make any legal or financial decisions    Call your doctor's office immediately, if there is is anything unusual:   1. increased and continuing rectal bleeding   2. fever   3. Unusual abdominal pain    Take it easy today and resume normal activity tomorrow.It is common to have gas and mild bloating for a few hours. Pain is NOT normal. You may be groggy off and on for a few hours.    Resume previous diet.        Bashir Novoa Jr, MD  7/24/2024  2:09 PM

## 2024-09-10 ENCOUNTER — HOSPITAL ENCOUNTER (OUTPATIENT)
Facility: HOSPITAL | Age: 69
Discharge: HOME | End: 2024-09-13
Attending: INTERNAL MEDICINE
Payer: MEDICARE

## 2024-09-10 DIAGNOSIS — M81.0 AGE-RELATED OSTEOPOROSIS WITHOUT CURRENT PATHOLOGICAL FRACTURE: ICD-10-CM

## 2024-09-10 PROCEDURE — 77080 DXA BONE DENSITY AXIAL: CPT

## 2024-10-23 ENCOUNTER — HOSPITAL ENCOUNTER (OUTPATIENT)
Facility: HOSPITAL | Age: 69
Discharge: HOME | End: 2024-10-26
Attending: INTERNAL MEDICINE
Payer: MEDICARE

## 2024-10-23 DIAGNOSIS — E55.9 VITAMIN D DEFICIENCY, UNSPECIFIED: ICD-10-CM

## 2024-10-23 DIAGNOSIS — M81.0 AGE-RELATED OSTEOPOROSIS WITHOUT CURRENT PATHOLOGICAL FRACTURE: ICD-10-CM

## 2024-10-23 DIAGNOSIS — E83.52 HYPERCALCEMIA: ICD-10-CM

## 2024-10-23 DIAGNOSIS — E04.2 NONTOXIC MULTINODULAR GOITER: ICD-10-CM

## 2024-10-23 LAB
ALBUMIN SERPL-MCNC: 3.8 G/DL (ref 3.5–5)
ALBUMIN/GLOB SERPL: 1.1 (ref 1.1–2.2)
ALP SERPL-CCNC: 64 U/L (ref 45–117)
ALT SERPL-CCNC: 16 U/L (ref 12–78)
ANION GAP SERPL CALC-SCNC: 10 MMOL/L (ref 2–12)
AST SERPL W P-5'-P-CCNC: 14 U/L (ref 15–37)
BILIRUB SERPL-MCNC: 0.7 MG/DL (ref 0.2–1)
BUN SERPL-MCNC: 17 MG/DL (ref 6–20)
BUN/CREAT SERPL: 11 (ref 12–20)
CA-I BLD-MCNC: 9.5 MG/DL (ref 8.5–10.1)
CHLORIDE SERPL-SCNC: 110 MMOL/L (ref 97–108)
CO2 SERPL-SCNC: 28 MMOL/L (ref 21–32)
CREAT SERPL-MCNC: 1.58 MG/DL (ref 0.55–1.02)
GLOBULIN SER CALC-MCNC: 3.4 G/DL (ref 2–4)
GLUCOSE SERPL-MCNC: 91 MG/DL (ref 65–100)
POTASSIUM SERPL-SCNC: 4.2 MMOL/L (ref 3.5–5.1)
PROT SERPL-MCNC: 7.2 G/DL (ref 6.4–8.2)
SODIUM SERPL-SCNC: 148 MMOL/L (ref 136–145)
TSH SERPL DL<=0.05 MIU/L-ACNC: 1.03 UIU/ML (ref 0.36–3.74)

## 2024-10-23 PROCEDURE — 84439 ASSAY OF FREE THYROXINE: CPT

## 2024-10-23 PROCEDURE — 82306 VITAMIN D 25 HYDROXY: CPT

## 2024-10-23 PROCEDURE — 36415 COLL VENOUS BLD VENIPUNCTURE: CPT

## 2024-10-23 PROCEDURE — 84443 ASSAY THYROID STIM HORMONE: CPT

## 2024-10-23 PROCEDURE — 80053 COMPREHEN METABOLIC PANEL: CPT

## 2024-10-24 LAB
25(OH)D3 SERPL-MCNC: 43.8 NG/ML (ref 30–100)
T4 FREE SERPL-MCNC: 1 NG/DL (ref 0.8–1.5)

## 2024-11-04 ENCOUNTER — HOSPITAL ENCOUNTER (OUTPATIENT)
Facility: HOSPITAL | Age: 69
Setting detail: RECURRING SERIES
Discharge: HOME | End: 2024-11-07
Payer: MEDICARE

## 2024-11-04 PROCEDURE — 97161 PT EVAL LOW COMPLEX 20 MIN: CPT

## 2024-11-04 NOTE — THERAPY EVALUATION
asking questions, trying to perform skills, interest, and return verbalization   Persons(s) to be included in education: patient (P)  Barriers to Learning/Limitations: none  Measures taken if barriers to learning present: No measure required at this time.   Patient Self Reported Health Status: good  Rehabilitation Potential: good      PRECAUTIONS:   >>>PATIENT IS HIGH FALL RISK<<<         Date of Initial Evaluation: 11/4/2024  Date of last Progress Note: n/a  Visit # of last Progress Note: 1     Number of Insurance Authorized visits: Not Applicable        Therapeutic Exercise Flow Sheet:                                                                                    Running Visit #    EXERCISE   1   2   3   4   5   6   7   8   9   10      Nustep                        3-way hip                        Stair negotiation                        Leg press                        Tandem walking                        Side stepping                        Airex balancing                        Sit to stand                     Apollo knee                       Amb // bar no UE support                      [x] Eval only for Running Visit #1, no treatment provided  Modalities to be included future treatment sessions: TBD  Has HEP been provided to patient? [x] No    [] Yes                                      Access Code:                    Date Provided:  [] Reviewed HEP    [] Progressed/Changed HEP, details:        GOALS  Short Term Goals, to be met within 5 treatments:  Patient will demonstrate independence and compliance with HEP in order to increase mobility and assist with carryover from PT services.  Status at last Eval/Progress Note: not provided  Current Status: see above, Initial Evaluation completed today  Goal Met?  No     2.  Pt to improve LE strength to no less than 4+/5 to be able to walk with fewer rest breaks  Status at last Eval/Progress Note: currently 4/5 with left hip extension  Current Status: 3-/5 with

## 2024-11-06 ENCOUNTER — APPOINTMENT (OUTPATIENT)
Facility: HOSPITAL | Age: 69
End: 2024-11-06
Payer: MEDICARE

## 2024-11-07 ENCOUNTER — APPOINTMENT (OUTPATIENT)
Facility: HOSPITAL | Age: 69
End: 2024-11-07
Payer: MEDICARE

## 2024-11-11 ENCOUNTER — HOSPITAL ENCOUNTER (OUTPATIENT)
Facility: HOSPITAL | Age: 69
Setting detail: RECURRING SERIES
Discharge: HOME | End: 2024-11-14
Payer: MEDICARE

## 2024-11-11 PROCEDURE — 97110 THERAPEUTIC EXERCISES: CPT

## 2024-11-11 NOTE — PROGRESS NOTES
PHYSICAL THERAPY - MEDICARE DAILY TREATMENT NOTE (updated 3/23)    Date of Service: 2024        Patient Name:  Yulissa Macias :  1955   Medical   Diagnosis:  Dorsalgia, unspecified [M54.9]  Other chronic pain [G89.29] Treatment Diagnosis:  M54.59  OTHER LOWER BACK PAIN and M54.59, G89.29  CHRONIC LOWER BACK PAIN  and M62.81  GENERAL MUSCLE WEAKNESS, R26.81   Unsteadiness on feet, R27.8     Other lack of coordination, and R26.89   Abnormalities of gait and mobility    Referral Source:  Kailyn Ascencio PA Insurance:   Payor: [facility] MEDICARE / Plan: UNITEDHEALTHCARE DUAL COMPLETE / Product Type: *No Product type* /    [x] Patient's date of birth verified                      Visit #   Current  / Total 2 10   Time   In / Out 1317 1400   Total Treatment Time (in minutes) 43    Total Timed Codes (in minutes) 43    1:1 Treatment Time (in minutes) 43       [facility] Totals Reminder:  bill using total billable   min of TIMED therapeutic procedures and modalities.   8-22 min = 1 unit; 23-37 min = 2 units; 38-52 min = 3 units; 53-67 min = 4 units; 68-82 min = 5 units        SUBJECTIVE  Pain Level (0-10 scale): 3/10    Any medication changes, allergies to medications, adverse drug reactions, diagnosis change, or new procedure performed?: No  Medications: [x] Verified on Patient Summary List    Subjective functional status/changes:     \"Pt reports she really wants to walk with a cane .\"    OBJECTIVE  Therapeutic Procedures:  Tx Min Billable or 1:1 Min (if diff from Tx Min) Procedure, Rationale, Specifics   43 43 14489 Therapeutic Exercise (timed):  increase ROM, strength, coordination, balance, and proprioception to improve patient's ability to progress to PLOF and address remaining functional goals. (see flow sheet as applicable)        43 43    Total Total   [x] Patient Education billed concurrently with other procedures       Pain Level at end of session (0-10 scale): 2/10    Assessment   Patient tolerated

## 2024-11-13 ENCOUNTER — HOSPITAL ENCOUNTER (OUTPATIENT)
Facility: HOSPITAL | Age: 69
Setting detail: RECURRING SERIES
Discharge: HOME | End: 2024-11-16
Payer: MEDICARE

## 2024-11-13 PROCEDURE — 97110 THERAPEUTIC EXERCISES: CPT

## 2024-11-13 NOTE — PROGRESS NOTES
PHYSICAL THERAPY - MEDICARE DAILY TREATMENT NOTE (updated 3/23)    Date of Service: 2024        Patient Name:  Yulissa Macias :  1955   Medical   Diagnosis:  Dorsalgia, unspecified [M54.9]  Other chronic pain [G89.29] Treatment Diagnosis:  M54.59  OTHER LOWER BACK PAIN  and M62.81  GENERAL MUSCLE WEAKNESS, R26.81   Unsteadiness on feet, R27.8     Other lack of coordination, and R26.89   Abnormalities of gait and mobility    Referral Source:  Kailyn Ascencio PA Insurance:   Payor: [facility] MEDICARE / Plan: UNITEDHEALTHCARE DUAL COMPLETE / Product Type: *No Product type* /    [x] Patient's date of birth verified                      Visit #   Current  / Total 3 10   Time   In / Out 1303 1358   Total Treatment Time (in minutes) 55    Total Timed Codes (in minutes) 55    1:1 Treatment Time (in minutes) 55       [facility] Totals Reminder:  bill using total billable   min of TIMED therapeutic procedures and modalities.   8-22 min = 1 unit; 23-37 min = 2 units; 38-52 min = 3 units; 53-67 min = 4 units; 68-82 min = 5 units        SUBJECTIVE  Pain Level (0-10 scale): 3/10    Any medication changes, allergies to medications, adverse drug reactions, diagnosis change, or new procedure performed?: No  Medications: [x] Verified on Patient Summary List    Subjective functional status/changes:     \"Pt reports that she tries to use her other walker at home however it is too short  .\"    OBJECTIVE  Therapeutic Procedures:  Tx Min Billable or 1:1 Min (if diff from Tx Min) Procedure, Rationale, Specifics   55 55 35506 Therapeutic Exercise (timed):  increase ROM, strength, coordination, balance, and proprioception to improve patient's ability to progress to PLOF and address remaining functional goals. (see flow sheet as applicable)        55 55    Total Total   [x] Patient Education billed concurrently with other procedures       Pain Level at end of session (0-10 scale): 0/10    Assessment   Patient tolerated treatment

## 2024-11-18 ENCOUNTER — HOSPITAL ENCOUNTER (OUTPATIENT)
Facility: HOSPITAL | Age: 69
Setting detail: RECURRING SERIES
Discharge: HOME | End: 2024-11-21
Payer: MEDICARE

## 2024-11-18 PROCEDURE — 97110 THERAPEUTIC EXERCISES: CPT

## 2024-11-18 NOTE — PROGRESS NOTES
PHYSICAL THERAPY - MEDICARE DAILY TREATMENT NOTE (updated 3/23)    Date of Service: 2024        Patient Name:  Yulissa Macias :  1955   Medical   Diagnosis:  Dorsalgia, unspecified [M54.9]  Other chronic pain [G89.29] Treatment Diagnosis:  M62.81  GENERAL MUSCLE WEAKNESS, R26.81   Unsteadiness on feet, R27.8     Other lack of coordination, and R26.89   Abnormalities of gait and mobility    Referral Source:  Kailyn Ascencio PA Insurance:   Payor: [facility] MEDICARE / Plan: UNITEDHEALTHCARE DUAL COMPLETE / Product Type: *No Product type* /    [x] Patient's date of birth verified                      Visit #   Current  / Total 4 10   Time   In / Out 1301 1358   Total Treatment Time (in minutes) 57    Total Timed Codes (in minutes) 57    1:1 Treatment Time (in minutes) 57       [facility] Totals Reminder:  bill using total billable   min of TIMED therapeutic procedures and modalities.   8-22 min = 1 unit; 23-37 min = 2 units; 38-52 min = 3 units; 53-67 min = 4 units; 68-82 min = 5 units        SUBJECTIVE  Pain Level (0-10 scale): 0/10    Any medication changes, allergies to medications, adverse drug reactions, diagnosis change, or new procedure performed?: No  Medications: [x] Verified on Patient Summary List    Subjective functional status/changes:     \"Pt .\"    OBJECTIVE  Therapeutic Procedures:  Tx Min Billable or 1:1 Min (if diff from Tx Min) Procedure, Rationale, Specifics   57 57 70957 Therapeutic Exercise (timed):  increase ROM, strength, coordination, balance, and proprioception to improve patient's ability to progress to PLOF and address remaining functional goals. (see flow sheet as applicable)   57 57    Total Total   [x] Patient Education billed concurrently with other procedures       Pain Level at end of session (0-10 scale): 0/10    Assessment   Patient tolerated treatment working on functional balance and strengthening in core and LE. Pt. has been able to increase her walking distance and

## 2024-11-20 ENCOUNTER — HOSPITAL ENCOUNTER (OUTPATIENT)
Facility: HOSPITAL | Age: 69
Setting detail: RECURRING SERIES
Discharge: HOME | End: 2024-11-23
Payer: MEDICARE

## 2024-11-20 PROCEDURE — 97110 THERAPEUTIC EXERCISES: CPT

## 2024-11-20 NOTE — PROGRESS NOTES
PHYSICAL THERAPY - MEDICARE DAILY TREATMENT NOTE (updated 3/23)    Date of Service: 2024        Patient Name:  Yulsisa Macias :  1955   Medical   Diagnosis:  Dorsalgia, unspecified [M54.9]  Other chronic pain [G89.29] Treatment Diagnosis:  M79.605  Pain in left leg , M54.32  SCIATICA, LEFT SIDE, M54.42  LUMBAGO WITH SCIATICA, LEFT SIDE, and M54.59  OTHER LOWER BACK PAIN , and R26.81   Unsteadiness on feet, R27.8     Other lack of coordination, R26.89   Abnormalities of gait and mobility, and R27.9     Unspecified lack of coordination    Referral Source:  Kailyn Ascencio PA Insurance:   Payor: [facility] MEDICARE / Plan: UNITEDHEALTHCARE DUAL COMPLETE / Product Type: *No Product type* /    [x] Patient's date of birth verified                      Visit #   Current  / Total 5 10   Time   In / Out 1304 1400   Total Treatment Time (in minutes) 56    Total Timed Codes (in minutes) 56    1:1 Treatment Time (in minutes) 56       [facility] Totals Reminder:  bill using total billable   min of TIMED therapeutic procedures and modalities.   8-22 min = 1 unit; 23-37 min = 2 units; 38-52 min = 3 units; 53-67 min = 4 units; 68-82 min = 5 units        SUBJECTIVE  Pain Level (0-10 scale): 3/10    Any medication changes, allergies to medications, adverse drug reactions, diagnosis change, or new procedure performed?: No  Medications: [x] Verified on Patient Summary List    Subjective functional status/changes:     \"Just a little bit.\"    OBJECTIVE  Therapeutic Procedures:  Tx Min Billable or 1:1 Min (if diff from Tx Min) Procedure, Rationale, Specifics   56  [id number] Therapeutic Exercise (timed):  increase ROM, strength, coordination, balance, and proprioception to improve patient's ability to progress to PLOF and address remaining functional goals. (see flow sheet as applicable)   56     Total Total   [x] Patient Education billed concurrently with other procedures     Pain Level at end of session (0-10 scale):

## 2024-11-25 ENCOUNTER — HOSPITAL ENCOUNTER (OUTPATIENT)
Facility: HOSPITAL | Age: 69
Setting detail: RECURRING SERIES
Discharge: HOME | End: 2024-11-28
Payer: MEDICARE

## 2024-11-25 PROCEDURE — 97110 THERAPEUTIC EXERCISES: CPT

## 2024-11-25 NOTE — PROGRESS NOTES
scale): 1/10    Assessment   Patient tolerated treatment well today. She was able to progress sit to stands to perform them without UE support. She is progressing well with therapy and voices that she wants to keep coming for a while to further work on endurance. Patient will continue to benefit from skilled PT services to analyze and address functional mobility deficits, analyze and address ROM deficits, and analyze and address strength deficits to address functional deficits and attain remaining goals.    PRECAUTIONS:   >>>PATIENT IS HIGH FALL RISK<<<          Date of Initial Evaluation: 11/4/2024  Date of last Progress Note: n/a  Visit # of last Progress Note: 1      Number of Insurance Authorized visits: Not Applicable         Therapeutic Exercise Flow Sheet:                                                                                    Running Visit #    EXERCISE   1   2   3   4   5   6   7   8   9   10      Nustep       timer 10'  Lvl1 x 12'  Lvl 1 x 12'     Lvl 2 x12'  Lvl 2 x12'            Seated LAQ and march     2# x 20  2# x 20    2# x 20                3-way hip       x 20 ea   1 UE  x 20 ea   1 UE 2#  x 20 ea   1 UE 2# x20   + march 2# x20   + march            Tandem stance    2/15\" ea  No foam 3/15\" ea   see airex balance floor   30\"X2ea floor   30\"X2ea               Stair negotiation      Rev x 4 laps     Rev x 4 laps   Rev Alt LE  X4 laps Forward x5 laps (alt feet, but step to pattern)               Leg press      20#   x 20  20#   seat 4 x 20   20#   seat 4 x 20   seat 4  20# x20                Tandem walking       3'  Fwd/bwd  3'  Fwd/bwd 2#- 2'  Fwd/bwd 1 UE  x3'   1 UE  x3'               Side stepping       3' no UE 3' - 1 UE  2#- 2' - 1 UE   1-0 UE  2# x3'   1 UE  x3'                Airex balancing         --     --  tandem 2/ 15' ea                  Sit to stand      X 10    2/10 X 10   1 UE  x10  0UE x10            Apollo knee           20# knee ext x 20 + knee flex 30# x 20    Ext: 20#

## 2024-12-13 ENCOUNTER — TELEPHONE (OUTPATIENT)
Facility: HOSPITAL | Age: 69
End: 2024-12-13

## 2024-12-13 NOTE — TELEPHONE ENCOUNTER
Phone call made to patient 12/13/2024 at 1:43 PM to schedule follow-up treatment appointment secondary to lack of attendance. Patient was not contacted and voicemail was left requesting a return phone call.

## 2025-02-07 DIAGNOSIS — M81.0 AGE-RELATED OSTEOPOROSIS WITHOUT CURRENT PATHOLOGICAL FRACTURE: ICD-10-CM

## 2025-02-07 DIAGNOSIS — E83.52 HYPERCALCEMIA: ICD-10-CM

## 2025-02-07 RX ORDER — CINACALCET 30 MG/1
TABLET, FILM COATED ORAL
Qty: 30 TABLET | Refills: 0 | Status: SHIPPED | OUTPATIENT
Start: 2025-02-07

## 2025-02-24 ENCOUNTER — OFFICE VISIT (OUTPATIENT)
Age: 70
End: 2025-02-24
Payer: MEDICARE

## 2025-02-24 VITALS
OXYGEN SATURATION: 99 % | HEIGHT: 67 IN | BODY MASS INDEX: 23.01 KG/M2 | HEART RATE: 62 BPM | DIASTOLIC BLOOD PRESSURE: 74 MMHG | WEIGHT: 146.6 LBS | SYSTOLIC BLOOD PRESSURE: 144 MMHG | TEMPERATURE: 97.9 F

## 2025-02-24 DIAGNOSIS — E55.9 VITAMIN D DEFICIENCY, UNSPECIFIED: ICD-10-CM

## 2025-02-24 DIAGNOSIS — E83.52 HYPERCALCEMIA: Primary | ICD-10-CM

## 2025-02-24 DIAGNOSIS — M81.0 AGE-RELATED OSTEOPOROSIS WITHOUT CURRENT PATHOLOGICAL FRACTURE: ICD-10-CM

## 2025-02-24 DIAGNOSIS — E04.2 NONTOXIC MULTINODULAR GOITER: ICD-10-CM

## 2025-02-24 PROCEDURE — G8420 CALC BMI NORM PARAMETERS: HCPCS | Performed by: INTERNAL MEDICINE

## 2025-02-24 PROCEDURE — 3077F SYST BP >= 140 MM HG: CPT | Performed by: INTERNAL MEDICINE

## 2025-02-24 PROCEDURE — 1159F MED LIST DOCD IN RCRD: CPT | Performed by: INTERNAL MEDICINE

## 2025-02-24 PROCEDURE — 3017F COLORECTAL CA SCREEN DOC REV: CPT | Performed by: INTERNAL MEDICINE

## 2025-02-24 PROCEDURE — G8427 DOCREV CUR MEDS BY ELIG CLIN: HCPCS | Performed by: INTERNAL MEDICINE

## 2025-02-24 PROCEDURE — 1090F PRES/ABSN URINE INCON ASSESS: CPT | Performed by: INTERNAL MEDICINE

## 2025-02-24 PROCEDURE — 3078F DIAST BP <80 MM HG: CPT | Performed by: INTERNAL MEDICINE

## 2025-02-24 PROCEDURE — 4004F PT TOBACCO SCREEN RCVD TLK: CPT | Performed by: INTERNAL MEDICINE

## 2025-02-24 PROCEDURE — G8399 PT W/DXA RESULTS DOCUMENT: HCPCS | Performed by: INTERNAL MEDICINE

## 2025-02-24 PROCEDURE — 1160F RVW MEDS BY RX/DR IN RCRD: CPT | Performed by: INTERNAL MEDICINE

## 2025-02-24 PROCEDURE — 1123F ACP DISCUSS/DSCN MKR DOCD: CPT | Performed by: INTERNAL MEDICINE

## 2025-02-24 PROCEDURE — 99214 OFFICE O/P EST MOD 30 MIN: CPT | Performed by: INTERNAL MEDICINE

## 2025-02-24 PROCEDURE — 1125F AMNT PAIN NOTED PAIN PRSNT: CPT | Performed by: INTERNAL MEDICINE

## 2025-02-24 RX ORDER — ALENDRONATE SODIUM 70 MG/1
TABLET ORAL
Qty: 12 TABLET | Refills: 3 | Status: SHIPPED | OUTPATIENT
Start: 2025-02-24

## 2025-02-24 NOTE — PATIENT INSTRUCTIONS
SPECIFIC INSTRUCTIONS BELOW       Stay on sensipar 30 mg a day     Fosamax 70 mg a week  until dec 2025       -------------PAY ATTENTION TO THESE GENERAL INSTRUCTIONS -----------------      - The medications prescribed at this visit will not be available at pharmacy until 6 pm       - YOUR MED LIST IS NOT UP TO DATE AS SOME CHANGES ARE BEING MADE AFTER THE VISIT - FOLLOW SPECIFIC INSTRUCTIONS  ABOVE     -ANY tests other than blood work, which you opt to do  outside the  [facility] imaging facilities, you are responsible for prior authorizations if  required    - HEALTH MAINTENANCE IS NOT GOING TO BE UP TO DATE ON YOUR AVS- PLEASE IGNORE     Results     *Normal results will not be notified by a phone call starting January 1 2021   *If you have an upcoming visit, the results will be discussed at the visit   *Please sign up for MY CHART if you want access to your lab and test results  *Abnormal results which require immediate attention will be notified by phone call   *Abnormal results which do not require immediate assistance will be notified in 1-2 weeks       Refills    -    have your pharmacy send us a refill request . Refills are done max for one year and a visit is a must before refills are extended    Follow up appointments -  highly encourage you to make it when you are checking out. We can accommodate you into the schedule based on your clinical situation, but not for extending refills beyond a year. Labs are important to give refills and is important to get labs before the visit     Phone calls  -  Allow  24 hrs. for non-urgent calls to be returned  Prior authorization - It may take 2-4 weeks to process  Forms  -  FMLA, DMV etc., will take up to 2 weeks to process  Cancellations - please notify the office 2 days in advance   Samples  - will only be dispensed at visits       If not showing for the appointments and cancelling appointments within 24 hours are kept track of and three  of such situations in  two

## 2025-02-24 NOTE — PROGRESS NOTES
[facility] DIABETES AND ENDOCRINOLOGY            Alka Samaniego MD FACE     HISTORY OF PRESENT ILLNESS  Yulissa Macias is a 70  y.o. female.    Yearly   f/u after  Last visit for thyroid goiter and hypercalcemia  and osteoporosis     From last visit  dec 2023     Pt had  thyroid FNA done in jan 2018 , Negative for cancer   Pt had undergone parathyroid study  In nov 2018 for hypercalcemia eval  - done by nephrologist   Compliant with fosamax and sensipar     She denies any hyper or hypothyroid symptoms   She denies any compressive symptoms     Past medical history : she has psychiatric issues/ schizophrenia  - on lithium and topiramate       Review of Systems   Feet pain /issues     Physical Exam   Constitutional: She is oriented to person, place, and time. She appears well-developed and well-nourished.   Using walker     Labs    Lab Results   Component Value Date     (H) 10/23/2024    K 4.2 10/23/2024     (H) 10/23/2024    CO2 28 10/23/2024    BUN 17 10/23/2024    CREATININE 1.58 (H) 10/23/2024    GLUCOSE 91 10/23/2024    CALCIUM 9.5 10/23/2024    BILITOT 0.7 10/23/2024    ALKPHOS 64 10/23/2024    AST 14 (L) 10/23/2024    ALT 16 10/23/2024    LABGLOM 36 (L) 10/23/2024    GFRAA 43 (L) 05/26/2022    AGRATIO 1.1 12/13/2022    GLOB 3.4 10/23/2024        Lab Results   Component Value Date    TSH 1.03 10/23/2024    FT3 2.2 10/12/2021    T4FREE 1.0 10/23/2024        ASSESSMENT and PLAN    1. Hypercalcemia : Calcium stayed normal since being  on sensipar 30 mg a day - will continue   (First found to be high in June 2022 )     hyperparathyroidism,  AND SHE HAS BOTH SECONDARY AND PRIMARY HYPERPARATHYRODISM    HER NEPHROLOGIST IS DR. EUBANKS   The parathyroid scan at [facility]  2018   Showed positivity on left inferior pole matching to the location of  parathyrodi nodule on usg done at [facility]   She can go for parathyroidectomy,  but pt deferred it     2. Sub-clinical hypothyroidism  - resolved ,  euthryoid now

## 2025-02-24 NOTE — PROGRESS NOTES
Yulissa Macias is a 70 y.o. female here for   Chief Complaint   Patient presents with    Thyroid Problem       1. Have you been to the ER, urgent care clinic since your last visit?  Hospitalized since your last visit? -No    2. Have you seen or consulted any other health care providers outside of the [facility] since your last visit?  Include any pap smears or colon screening.-No

## 2025-06-09 ENCOUNTER — HOSPITAL ENCOUNTER (OUTPATIENT)
Facility: HOSPITAL | Age: 70
Setting detail: RECURRING SERIES
Discharge: HOME | End: 2025-06-12
Payer: MEDICARE

## 2025-06-09 PROCEDURE — 97161 PT EVAL LOW COMPLEX 20 MIN: CPT

## 2025-06-09 NOTE — THERAPY EVALUATION
valid certification **  Please sign and fax to [phone].  Thank you    Patient Name:  Yulissa Macias :  1955

## 2025-06-17 ENCOUNTER — HOSPITAL ENCOUNTER (OUTPATIENT)
Facility: HOSPITAL | Age: 70
Setting detail: RECURRING SERIES
Discharge: HOME | End: 2025-06-20
Payer: MEDICARE

## 2025-06-17 PROCEDURE — 97110 THERAPEUTIC EXERCISES: CPT

## 2025-06-17 NOTE — PROGRESS NOTES
PHYSICAL THERAPY - MEDICARE DAILY TREATMENT NOTE (updated 3/23)    Date of Service: 2025        Patient Name:  Yulissa Macias :  1955   Medical   Diagnosis:  Chronic bilateral low back pain without sciatica [M54.50, G89.29] Treatment Diagnosis:  M25.572 LEFT ANKLE PAIN and pain in the joint of the left foot  , M54.32  SCIATICA, LEFT SIDE and M54.59, G89.29  CHRONIC LOWER BACK PAIN , and R26.81   Unsteadiness on feet and R26.89   Abnormalities of gait and mobility    Referral Source:  Kathia Chavarria PA Insurance:   Payor: [facility] MEDICARE / Plan: UNITEDHE[employer]CARE DUAL COMPLETE / Product Type: *No Product type* /    [x] Patient's date of birth verified                      Visit #   Current  / Total 2 10   Time   In / Out 15:00 15:56   Total Treatment Time (in minutes) 56    Total Timed Codes (in minutes) 56    1:1 Treatment Time (in minutes) 56       [facility] Totals Reminder:  bill using total billable   min of TIMED therapeutic procedures and modalities.   8-22 min = 1 unit; 23-37 min = 2 units; 38-52 min = 3 units; 53-67 min = 4 units; 68-82 min = 5 units        SUBJECTIVE  Pain Level (0-10 scale): 3/10    Any medication changes, allergies to medications, adverse drug reactions, diagnosis change, or new procedure performed?: No  Medications: [x] Verified on Patient Summary List    Subjective functional status/changes:     \"I'm having ashy pain and  I feel weak. I want to be able to walk with my cane. \"  Reported fatigue at close of session.    OBJECTIVE  Therapeutic Procedures:  Tx Min Billable or 1:1 Min (if diff from Tx Min) Procedure, Rationale, Specifics   56 56 [id number] Therapeutic Exercise (timed):  increase ROM, strength, coordination, balance, and proprioception to improve patient's ability to progress to PLOF and address remaining functional goals. (see flow sheet as applicable)                   56 56    Total Total   [x] Patient Education billed concurrently with other procedures   Pain Level

## 2025-06-19 ENCOUNTER — HOSPITAL ENCOUNTER (OUTPATIENT)
Facility: HOSPITAL | Age: 70
Setting detail: RECURRING SERIES
Discharge: HOME | End: 2025-06-22
Payer: MEDICARE

## 2025-06-19 PROCEDURE — 97110 THERAPEUTIC EXERCISES: CPT

## 2025-06-19 NOTE — PROGRESS NOTES
PHYSICAL THERAPY - MEDICARE DAILY TREATMENT NOTE (updated 3/23)    Date of Service: 2025        Patient Name:  Yulissa Macias :  1955   Medical   Diagnosis:  Chronic bilateral low back pain without sciatica [M54.50, G89.29] Treatment Diagnosis:  M54.59, G89.29  CHRONIC LOWER BACK PAIN    Referral Source:  Kathia Chavarria PA Insurance:   Payor: [facility] MEDICARE / Plan: [employer] DUAL COMPLETE / Product Type: *No Product type* /    [x] Patient's date of birth verified                      Visit #   Current  / Total 3 10   Time   In / Out 1500 1553   Total Treatment Time (in minutes) 53    Total Timed Codes (in minutes) 53    1:1 Treatment Time (in minutes) 53       [facility] Totals Reminder:  bill using total billable   min of TIMED therapeutic procedures and modalities.   8-22 min = 1 unit; 23-37 min = 2 units; 38-52 min = 3 units; 53-67 min = 4 units; 68-82 min = 5 units        SUBJECTIVE  Pain Level (0-10 scale): 3/10    Any medication changes, allergies to medications, adverse drug reactions, diagnosis change, or new procedure performed?: No  Medications: [x] Verified on Patient Summary List    Subjective functional status/changes:     \"Doing Ok!.\"    OBJECTIVE  Therapeutic Procedures:  Tx Min Billable or 1:1 Min (if diff from Tx Min) Procedure, Rationale, Specifics   53 [id number] Therapeutic Exercise (timed):  increase ROM, strength, coordination, balance, and proprioception to improve patient's ability to progress to PLOF and address remaining functional goals. (see flow sheet as applicable)                   53 53    Total Total   [x] Patient Education billed concurrently with other procedures       Pain Level at end of session (0-10 scale): 1/10    Assessment   Patient tolerated treatment with the addition of lumbar stretching exercises in sitting and supine. Patient does well with supervision. Limited range of hip abduction. HEP provided  Patient will continue to benefit from skilled PT

## 2025-06-24 ENCOUNTER — HOSPITAL ENCOUNTER (OUTPATIENT)
Facility: HOSPITAL | Age: 70
Setting detail: RECURRING SERIES
Discharge: HOME | End: 2025-06-27
Payer: MEDICARE

## 2025-06-24 PROCEDURE — 97110 THERAPEUTIC EXERCISES: CPT

## 2025-06-24 NOTE — PROGRESS NOTES
PHYSICAL THERAPY - MEDICARE DAILY TREATMENT NOTE (updated 3/23)    Date of Service: 2025        Patient Name:  Yulissa Macias :  1955   Medical   Diagnosis:  Chronic bilateral low back pain without sciatica [M54.50, G89.29] Treatment Diagnosis:  M54.59, G89.29  CHRONIC LOWER BACK PAIN    Referral Source:  Kathia Chavarria PA Insurance:   Payor: [facility] MEDICARE / Plan: [employer] DUAL COMPLETE / Product Type: *No Product type* /    [x] Patient's date of birth verified                      Visit #   Current  / Total 3 10   Time   In / Out 1500 1600   Total Treatment Time (in minutes) 60   Total Timed Codes (in minutes) 60   1:1 Treatment Time (in minutes) 60       [facility] Totals Reminder:  bill using total billable   min of TIMED therapeutic procedures and modalities.   8-22 min = 1 unit; 23-37 min = 2 units; 38-52 min = 3 units; 53-67 min = 4 units; 68-82 min = 5 units        SUBJECTIVE  Pain Level (0-10 scale): 2/10    Any medication changes, allergies to medications, adverse drug reactions, diagnosis change, or new procedure performed?: No  Medications: [x] Verified on Patient Summary List    Subjective functional status/changes:     \"I'm here.\"    OBJECTIVE  Therapeutic Procedures:  Tx Min Billable or 1:1 Min (if diff from Tx Min) Procedure, Rationale, Specifics   60 60 [id number] Therapeutic Exercise (timed):  increase ROM, strength, coordination, balance, and proprioception to improve patient's ability to progress to PLOF and address remaining functional goals. (see flow sheet as applicable)   60 60    Total Total   [x] Patient Education billed concurrently with other procedures       Pain Level at end of session (0-10 scale): 0/10    Assessment   Patient tolerated treatment well. Patient needed cues for upright posture throughout her standing and seated exercises. Patient states that she feels achy but has no pain after exercising. Patient will continue to benefit from skilled PT services to

## 2025-06-26 ENCOUNTER — HOSPITAL ENCOUNTER (OUTPATIENT)
Facility: HOSPITAL | Age: 70
Setting detail: RECURRING SERIES
Discharge: HOME | End: 2025-06-29
Payer: MEDICARE

## 2025-06-26 PROCEDURE — 97110 THERAPEUTIC EXERCISES: CPT

## 2025-06-26 NOTE — PROGRESS NOTES
PHYSICAL THERAPY - MEDICARE DAILY TREATMENT NOTE (updated 3/23)    Date of Service: 2025        Patient Name:  Yulissa Macias :  1955   Medical   Diagnosis:  Chronic bilateral low back pain without sciatica [M54.50, G89.29] Treatment Diagnosis:  M54.59, G89.29  CHRONIC LOWER BACK PAIN    Referral Source:  Kathia Chavarria PA Insurance:   Payor: [facility] MEDICARE / Plan: [employer] DUAL COMPLETE / Product Type: *No Product type* /    [x] Patient's date of birth verified                      Visit #   Current  / Total 5 10   Time   In / Out 15:07 16:13   Total Treatment Time (in minutes) 66    Total Timed Codes (in minutes) 66    1:1 Treatment Time (in minutes) 66       [facility] Totals Reminder:  bill using total billable   min of TIMED therapeutic procedures and modalities.   8-22 min = 1 unit; 23-37 min = 2 units; 38-52 min = 3 units; 53-67 min = 4 units; 68-82 min = 5 units        SUBJECTIVE  Pain Level (0-10 scale): 3/10    Any medication changes, allergies to medications, adverse drug reactions, diagnosis change, or new procedure performed?: No  Medications: [x] Verified on Patient Summary List    Subjective functional status/changes:     \"My knees and ankles hurt and my left leg feels like it wants to give out. I go to the [facility] every [facility].  .\"    OBJECTIVE  Therapeutic Procedures:  Tx Min Billable or 1:1 Min (if diff from Tx Min) Procedure, Rationale, Specifics   66 66 [id number] Therapeutic Exercise (timed):  increase ROM, strength, coordination, balance, and proprioception to improve patient's ability to progress to PLOF and address remaining functional goals. (see flow sheet as applicable)                   66 66    Total Total   [x] Patient Education billed concurrently with other procedures     Pain Level at end of session (0-10 scale): 0/10    Assessment   Patient tolerated all exercises without complaint. Patient required frequent visual and verbal education and tactile cues for correct

## 2025-07-02 ENCOUNTER — HOSPITAL ENCOUNTER (OUTPATIENT)
Facility: HOSPITAL | Age: 70
Setting detail: RECURRING SERIES
Discharge: HOME | End: 2025-07-05
Payer: MEDICARE

## 2025-07-02 PROCEDURE — 97110 THERAPEUTIC EXERCISES: CPT

## 2025-07-02 NOTE — PROGRESS NOTES
PHYSICAL THERAPY - MEDICARE DAILY TREATMENT NOTE (updated 3/23)    Date of Service: 2025        Patient Name:  Yulissa Macias :  1955   Medical   Diagnosis:  Chronic bilateral low back pain without sciatica [M54.50, G89.29] Treatment Diagnosis:  M54.59, G89.29  CHRONIC LOWER BACK PAIN    Referral Source:  Kathia Chavarria PA Insurance:   Payor: [facility] MEDICARE / Plan: [employer] DUAL COMPLETE / Product Type: *No Product type* /    [x] Patient's date of birth verified                      Visit #   Current  / Total 6 10   Time   In / Out 13:58 14:56   Total Treatment Time (in minutes) 58    Total Timed Codes (in minutes) 58    1:1 Treatment Time (in minutes) 58       [facility] Totals Reminder:  bill using total billable   min of TIMED therapeutic procedures and modalities.   8-22 min = 1 unit; 23-37 min = 2 units; 38-52 min = 3 units; 53-67 min = 4 units; 68-82 min = 5 units        SUBJECTIVE  Pain Level (0-10 scale): 3/10    Any medication changes, allergies to medications, adverse drug reactions, diagnosis change, or new procedure performed?: No  Medications: [x] Verified on Patient Summary List    Subjective functional status/changes:     \"My pain is in my knees and ankles today. I haven't had any falls. I'm still doing my exercises at home..\"    OBJECTIVE  Therapeutic Procedures:  Tx Min Billable or 1:1 Min (if diff from Tx Min) Procedure, Rationale, Specifics   58 58 [id number] Therapeutic Exercise (timed):  increase ROM, strength, coordination, balance, and proprioception to improve patient's ability to progress to PLOF and address remaining functional goals. (see flow sheet as applicable)                   58 58    Total Total   [x] Patient Education billed concurrently with other procedures     Pain Level at end of session (0-10 scale): 0/10    Assessment   Patient treatment this date focused on core and LE stretching and strengthening exercises.  Patient required verbal education for correct form

## 2025-07-08 ENCOUNTER — HOSPITAL ENCOUNTER (OUTPATIENT)
Facility: HOSPITAL | Age: 70
Setting detail: RECURRING SERIES
Discharge: HOME | End: 2025-07-11
Payer: MEDICARE

## 2025-07-08 PROCEDURE — 97110 THERAPEUTIC EXERCISES: CPT

## 2025-07-08 NOTE — PROGRESS NOTES
[facility] Rehabilitation Services  [address] [place] [id number]  Ph: [phone]     Fax: [phone]    PHYSICAL THERAPY DISCHARGE SUMMARY  Patient Name: Yulissa Macias : 1955   Treatment/Medical Diagnosis: Chronic bilateral low back pain without sciatica [M54.50, G89.29]   Referral Source: Kathia Chavarria PA     Date of Initial Visit: 25 Attended Visits: 7 Missed Visits: 0     SUMMARY OF TREATMENT/CURRENT STATUS  Patient presented to physical therapy with complaints of lower back pain, decreased ROM, and lower extremity weakness limiting functional activities.  Pt has received treatment including lumbopelvic and lower extremity flexibility, ROM, strengthening, pelvic and core stabilization, functional activities, and education in posture and body mechanics.  Patient has demonstrated overall progress in strength, rom, pain level, and overall function.  Pt reports consistently attending [employer] and staying active at home.  Pt is pleased with progress and reports no longer having complaints of back pain.  Home exercise program reviewed today with no concerns voiced by patient.  Patient to be discharged at this time secondary to all goals met and current level of functional mobility. Thank you for this referral.     STATUS OF GOALS  Short Term Goals, to be met within 5 treatments:  Patient will demonstrate independence and compliance with HEP in order to increase mobility and assist with carryover from PT services.  Status at last Eval/Progress Note:  provided HEP  Current Status: [employer] consistently and compliant with HEP at home  Goal Met?  yes     2.  Patient will be able to  object off the ground without difficulty to increase functional mobility.  Status at last Eval/Progress Note: has to have difficulty to get object off the ground  Current Status: has to have support to get object off the ground  Goal Met?  progressing     3. Pt to improve LE strength to no

## 2025-07-08 NOTE — PROGRESS NOTES
PHYSICAL THERAPY - MEDICARE DAILY TREATMENT NOTE (updated 3/23)    Date of Service: 2025        Patient Name:  Yulissa Macias :  1955   Medical   Diagnosis:  Chronic bilateral low back pain without sciatica [M54.50, G89.29] Treatment Diagnosis:  M54.59, G89.29  CHRONIC LOWER BACK PAIN    Referral Source:  Kathia Chavarria PA Insurance:   Payor: [facility] MEDICARE / Plan: UNITEDHE[employer] DUAL COMPLETE / Product Type: *No Product type* /    [x] Patient's date of birth verified                      Visit #   Current  / Total 7 10   Time   In / Out 1440 1530   Total Treatment Time (in minutes) 50   Total Timed Codes (in minutes) 50   1:1 Treatment Time (in minutes) 50      [facility] Totals Reminder:  bill using total billable   min of TIMED therapeutic procedures and modalities.   8-22 min = 1 unit; 23-37 min = 2 units; 38-52 min = 3 units; 53-67 min = 4 units; 68-82 min = 5 units        SUBJECTIVE  Pain Level (0-10 scale): /10    Any medication changes, allergies to medications, adverse drug reactions, diagnosis change, or new procedure performed?: No  Medications: [x] Verified on Patient Summary List    Subjective functional status/changes:     \"I feel like I am getting better.  I do the walking and sit/stand at home, and my bike and foot exercises in bed. I [facility] every day I am not here.\"    OBJECTIVE    Posture:  fair  Gait and Functional Mobility: ambulates with use of rollator walker; wide SHABNAM; decreased claudia. Pt with limited to no B knee arom with gait.  Cues required to stay close to rollator.              Hip:   Strength AROM       Right Left Right Left     Flexion  5/5 5/5 WFL WFL   Knee:   Strength AROM       Right Left Right Left     Flexion 5/5 5/5 WFL WFL     Extension 5/5 5/5 WFL WFL   Ankle   Strength AROM       Right Left Right Left     Dorsiflexion 5/5 5/5 WFL WFL     Plantarflexion 4/5 4/5 WFL WFL   All ROM measurements are measured in degrees.     Lumbar:  Flexion: 70 degrees

## 2025-07-10 ENCOUNTER — APPOINTMENT (OUTPATIENT)
Facility: HOSPITAL | Age: 70
End: 2025-07-10
Payer: MEDICARE

## (undated) DEVICE — GLOVE SURG SZ 8 L12IN FNGR THK79MIL GRN LTX FREE

## (undated) DEVICE — TUBING, SUCTION, 9/32" X 10', STRAIGHT: Brand: MEDLINE

## (undated) DEVICE — BIPOLAR IRRIGATOR INTEGRATED TUBING AND BIPOLAR CORD SET, DISPOSABLE

## (undated) DEVICE — GOWN,SIRUS,FABRNF,XL,20/CS: Brand: MEDLINE

## (undated) DEVICE — SOLUTION IRRIG 1000ML STRL H2O USP PLAS POUR BTL

## (undated) DEVICE — BONE WAX WHITE: Brand: BONE WAX WHITE

## (undated) DEVICE — LINE SAMPLING ADVANCE ORAL NASAL MICROSTREAM O2 TUBING 6.5'

## (undated) DEVICE — SPONGE GZ W4XL4IN COT 12 PLY TYP VII WVN C FLD DSGN STERILE

## (undated) DEVICE — FORCEPS BX L240CM JAW DIA2.4MM ORNG L CAP W/ NDL DISP RAD

## (undated) DEVICE — GLOVE ORANGE PI 7 1/2   MSG9075

## (undated) DEVICE — TOOL 14MH30 LEGEND 14CM 3MM: Brand: MIDAS REX ™

## (undated) DEVICE — SUTURE STRATAFIX SPRL MCRYL + SZ 3 0 L27IN ABSRB UD SH L26MM SXMP1B428

## (undated) DEVICE — RESERVOIR,SUCTION,100CC,SILICONE: Brand: MEDLINE

## (undated) DEVICE — GLOVE SURG SZ 75 L12IN FNGR THK94MIL STD WHT LTX FREE

## (undated) DEVICE — PREP SKN DURAPREP 26ML APPL --

## (undated) DEVICE — COVER,MAYO STAND,STERILE: Brand: MEDLINE

## (undated) DEVICE — 1200CC GUARDIAN II: Brand: GUARDIAN

## (undated) DEVICE — PAD,PREPPING,CUFFED,24X48,7",NONSTERILE: Brand: MEDLINE

## (undated) DEVICE — TAPE,CLOTH/SILK,CURAD,3"X10YD,LF,40/CS: Brand: CURAD

## (undated) DEVICE — DRILL BIT 7080510 11 MM DRILL BIT S

## (undated) DEVICE — DRAIN SURG FLAT W7MMXL20CM FULL PERF

## (undated) DEVICE — COLLAR FOAM CERV ADJ 3X22IN --

## (undated) DEVICE — C-ARM: Brand: UNBRANDED

## (undated) DEVICE — HALTER TRACTION HD W/ TRI COTTON LINING FOAM LTX

## (undated) DEVICE — MASTISOL ADHESIVE LIQ 2/3ML

## (undated) DEVICE — ANTERIOR CERVICAL-SMH: Brand: MEDLINE INDUSTRIES, INC.

## (undated) DEVICE — SOLUTION IV 250ML 0.9% SOD CHL CLR INJ FLX BG CONT PRT CLSR

## (undated) DEVICE — TELFA NON-ADHERENT ABSORBENT DRESSING: Brand: TELFA

## (undated) DEVICE — SOLUTION IRRIG 1000ML 0.9% SOD CHL USP POUR PLAS BTL

## (undated) DEVICE — JELLY,LUBE,STERILE,FLIP TOP,TUBE,4-OZ: Brand: MEDLINE